# Patient Record
Sex: FEMALE | Race: WHITE
[De-identification: names, ages, dates, MRNs, and addresses within clinical notes are randomized per-mention and may not be internally consistent; named-entity substitution may affect disease eponyms.]

---

## 2021-05-25 ENCOUNTER — HOSPITAL ENCOUNTER (EMERGENCY)
Dept: HOSPITAL 100 - ED | Age: 31
Discharge: HOME | End: 2021-05-25
Payer: COMMERCIAL

## 2021-05-25 VITALS
RESPIRATION RATE: 15 BRPM | OXYGEN SATURATION: 100 % | HEART RATE: 84 BPM | DIASTOLIC BLOOD PRESSURE: 97 MMHG | SYSTOLIC BLOOD PRESSURE: 128 MMHG

## 2021-05-25 VITALS — BODY MASS INDEX: 35.4 KG/M2

## 2021-05-25 VITALS
SYSTOLIC BLOOD PRESSURE: 139 MMHG | HEART RATE: 102 BPM | OXYGEN SATURATION: 97 % | RESPIRATION RATE: 16 BRPM | TEMPERATURE: 98.3 F | DIASTOLIC BLOOD PRESSURE: 76 MMHG
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
## 2021-05-25 DIAGNOSIS — S90.31XA: Primary | ICD-10-CM
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
## 2021-05-25 DIAGNOSIS — F17.210: ICD-10-CM

## 2021-05-25 DIAGNOSIS — J45.909: ICD-10-CM

## 2021-05-25 PROCEDURE — 73630 X-RAY EXAM OF FOOT: CPT

## 2021-05-25 PROCEDURE — 99282 EMERGENCY DEPT VISIT SF MDM: CPT

## 2022-06-06 ENCOUNTER — HOSPITAL ENCOUNTER (EMERGENCY)
Age: 32
Discharge: HOME | End: 2022-06-06
Payer: COMMERCIAL

## 2022-06-06 VITALS
SYSTOLIC BLOOD PRESSURE: 125 MMHG | DIASTOLIC BLOOD PRESSURE: 78 MMHG | RESPIRATION RATE: 14 BRPM | OXYGEN SATURATION: 99 % | HEART RATE: 78 BPM

## 2022-06-06 VITALS
SYSTOLIC BLOOD PRESSURE: 127 MMHG | RESPIRATION RATE: 18 BRPM | HEART RATE: 66 BPM | OXYGEN SATURATION: 98 % | TEMPERATURE: 97.3 F | DIASTOLIC BLOOD PRESSURE: 75 MMHG

## 2022-06-06 VITALS — BODY MASS INDEX: 28.7 KG/M2

## 2022-06-06 DIAGNOSIS — F31.9: ICD-10-CM

## 2022-06-06 DIAGNOSIS — F17.210: ICD-10-CM

## 2022-06-06 DIAGNOSIS — R56.9: ICD-10-CM

## 2022-06-06 DIAGNOSIS — E11.65: Primary | ICD-10-CM

## 2022-06-06 DIAGNOSIS — G89.29: ICD-10-CM

## 2022-06-06 LAB
ANION GAP: 7 (ref 5–15)
BUN SERPL-MCNC: 12 MG/DL (ref 7–18)
BUN/CREAT RATIO: 11.1 RATIO (ref 10–20)
CALCIUM SERPL-MCNC: 9.4 MG/DL (ref 8.5–10.1)
CARBON DIOXIDE: 28 MMOL/L (ref 21–32)
CHLORIDE: 99 MMOL/L (ref 98–107)
DEPRECATED RDW RBC: 39.5 FL (ref 35.1–43.9)
ERYTHROCYTE [DISTWIDTH] IN BLOOD: 12.2 % (ref 11.6–14.6)
EST GLOM FILT RATE - AFR AMER: 75 ML/MIN (ref 60–?)
ESTIMATED CREATININE CLEARANCE: 67.29 ML/MIN
GLUCOSE, DIPSTICK: 1000 MG/DL
GLUCOSE: 506 MG/DL (ref 74–106)
HCT VFR BLD AUTO: 42.8 % (ref 37–47)
HEMOGLOBIN: 14.9 G/DL (ref 12–15)
HGB BLD-MCNC: 14.9 G/DL (ref 12–15)
IMMATURE GRANULOCYTES COUNT: 0.03 X10^3/UL (ref 0–0)
MCV RBC: 88.4 FL (ref 81–99)
MEAN CORP HGB CONC: 34.8 G/DL (ref 32–36)
MEAN PLATELET VOL.: 10.9 FL (ref 6.2–12)
MUCOUS THREADS URNS QL MICRO: (no result) /HPF
NRBC FLAGGED BY ANALYZER: 0 % (ref 0–5)
PLATELET # BLD: 184 K/MM3 (ref 150–450)
PLATELET COUNT: 184 K/MM3 (ref 150–450)
POTASSIUM: 3.9 MMOL/L (ref 3.5–5.1)
RBC # BLD AUTO: 4.84 M/MM3 (ref 4.2–5.4)
RBC DISTRIBUTION WIDTH CV: 12.2 % (ref 11.6–14.6)
RBC DISTRIBUTION WIDTH SD: 39.5 FL (ref 35.1–43.9)
RBC UR QL: (no result) /HPF (ref 0–5)
RBC UR QL: 25 /UL
SP GR UR: 1.01 (ref 1–1.03)
SQUAMOUS URNS QL MICRO: (no result) /HPF (ref 5–10)
URINE PRESERVATIVE: (no result)
WBC # BLD AUTO: 7.9 K/MM3 (ref 4.4–11)
WHITE BLOOD COUNT: 7.9 K/MM3 (ref 4.4–11)

## 2022-06-06 PROCEDURE — 81001 URINALYSIS AUTO W/SCOPE: CPT

## 2022-06-06 PROCEDURE — 96360 HYDRATION IV INFUSION INIT: CPT

## 2022-06-06 PROCEDURE — 80048 BASIC METABOLIC PNL TOTAL CA: CPT

## 2022-06-06 PROCEDURE — A4216 STERILE WATER/SALINE, 10 ML: HCPCS

## 2022-06-06 PROCEDURE — 96361 HYDRATE IV INFUSION ADD-ON: CPT

## 2022-06-06 PROCEDURE — 85025 COMPLETE CBC W/AUTO DIFF WBC: CPT

## 2022-06-06 PROCEDURE — 82962 GLUCOSE BLOOD TEST: CPT

## 2022-06-06 PROCEDURE — 99283 EMERGENCY DEPT VISIT LOW MDM: CPT

## 2022-12-24 ENCOUNTER — HOSPITAL ENCOUNTER (EMERGENCY)
Age: 32
Discharge: LEFT BEFORE DISCHARGE | End: 2022-12-24
Payer: COMMERCIAL

## 2022-12-24 VITALS — TEMPERATURE: 98.42 F | RESPIRATION RATE: 18 BRPM | HEART RATE: 78 BPM | OXYGEN SATURATION: 99 %

## 2022-12-24 VITALS — BODY MASS INDEX: 33.3 KG/M2

## 2022-12-24 DIAGNOSIS — G89.29: ICD-10-CM

## 2022-12-24 DIAGNOSIS — J45.909: ICD-10-CM

## 2022-12-24 DIAGNOSIS — E11.65: ICD-10-CM

## 2022-12-24 DIAGNOSIS — R74.8: ICD-10-CM

## 2022-12-24 DIAGNOSIS — F17.210: ICD-10-CM

## 2022-12-24 DIAGNOSIS — F41.9: ICD-10-CM

## 2022-12-24 DIAGNOSIS — R10.9: Primary | ICD-10-CM

## 2022-12-24 DIAGNOSIS — F31.9: ICD-10-CM

## 2022-12-24 LAB
ALANINE AMINOTRANSFER ALT/SGPT: 170 U/L (ref 13–56)
ALBUMIN SERPL-MCNC: 4.1 G/DL (ref 3.2–5)
ALKALINE PHOSPHATASE: 77 U/L (ref 45–117)
ANION GAP: 4 (ref 5–15)
AST(SGOT): 151 U/L (ref 15–37)
BILIRUB UR QL STRIP: 1 MG/DL
BUN SERPL-MCNC: 18 MG/DL (ref 7–18)
BUN/CREAT RATIO: 21.3 RATIO (ref 10–20)
CALCIUM SERPL-MCNC: 9.5 MG/DL (ref 8.5–10.1)
CARBON DIOXIDE: 27 MMOL/L (ref 21–32)
CHLORIDE: 106 MMOL/L (ref 98–107)
DEPRECATED RDW RBC: 38.9 FL (ref 35.1–43.9)
ERYTHROCYTE [DISTWIDTH] IN BLOOD: 12.1 % (ref 11.6–14.6)
EST GLOM FILT RATE - AFR AMER: 99 ML/MIN (ref 60–?)
ESTIMATED CREATININE CLEARANCE: 84.71 ML/MIN
GLOBULIN: 3.5 G/DL (ref 2.2–4.2)
GLUCOSE, DIPSTICK: 1000 MG/DL
GLUCOSE: 254 MG/DL (ref 74–106)
HCT VFR BLD AUTO: 45.8 % (ref 37–47)
HEMOGLOBIN: 15.4 G/DL (ref 12–15)
HGB BLD-MCNC: 15.4 G/DL (ref 12–15)
IMMATURE GRANULOCYTES COUNT: 0.03 X10^3/UL (ref 0–0)
INTERNAL QC VALIDATED?: (no result)
KETONE-DIPSTICK: 15 MG/DL
LEUKOCYTE ESTERASE UR QL STRIP: 25 /UL
MCV RBC: 87.4 FL (ref 81–99)
MEAN CORP HGB CONC: 33.6 G/DL (ref 32–36)
MEAN PLATELET VOL.: 10.2 FL (ref 6.2–12)
MUCOUS THREADS URNS QL MICRO: (no result) /HPF
NRBC FLAGGED BY ANALYZER: 0 % (ref 0–5)
PLATELET # BLD: 213 K/MM3 (ref 150–450)
PLATELET COUNT: 213 K/MM3 (ref 150–450)
POTASSIUM: 3.8 MMOL/L (ref 3.5–5.1)
PROT UR QL STRIP.AUTO: 30 MG/DL
RBC # BLD AUTO: 5.24 M/MM3 (ref 4.2–5.4)
RBC DISTRIBUTION WIDTH CV: 12.1 % (ref 11.6–14.6)
RBC DISTRIBUTION WIDTH SD: 38.9 FL (ref 35.1–43.9)
RBC UR QL: (no result) /HPF (ref 0–5)
SP GR UR: 1.02 (ref 1–1.03)
SQUAMOUS URNS QL MICRO: (no result) /HPF (ref 5–10)
URINE PRESERVATIVE: (no result)
WBC # BLD AUTO: 9.6 K/MM3 (ref 4.4–11)
WHITE BLOOD COUNT: 9.6 K/MM3 (ref 4.4–11)

## 2022-12-24 PROCEDURE — 85025 COMPLETE CBC W/AUTO DIFF WBC: CPT

## 2022-12-24 PROCEDURE — 81025 URINE PREGNANCY TEST: CPT

## 2022-12-24 PROCEDURE — A4216 STERILE WATER/SALINE, 10 ML: HCPCS

## 2022-12-24 PROCEDURE — 81001 URINALYSIS AUTO W/SCOPE: CPT

## 2022-12-24 PROCEDURE — 80053 COMPREHEN METABOLIC PANEL: CPT

## 2022-12-24 PROCEDURE — 99282 EMERGENCY DEPT VISIT SF MDM: CPT

## 2022-12-26 ENCOUNTER — HOSPITAL ENCOUNTER (EMERGENCY)
Age: 32
Discharge: HOME | End: 2022-12-26
Payer: COMMERCIAL

## 2022-12-26 VITALS
HEART RATE: 97 BPM | SYSTOLIC BLOOD PRESSURE: 122 MMHG | TEMPERATURE: 97.7 F | OXYGEN SATURATION: 100 % | RESPIRATION RATE: 14 BRPM | DIASTOLIC BLOOD PRESSURE: 97 MMHG

## 2022-12-26 VITALS — BODY MASS INDEX: 32.5 KG/M2

## 2022-12-26 DIAGNOSIS — E11.9: ICD-10-CM
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
## 2022-12-26 DIAGNOSIS — F31.9: ICD-10-CM

## 2022-12-26 DIAGNOSIS — J45.909: ICD-10-CM

## 2022-12-26 DIAGNOSIS — F17.210: ICD-10-CM

## 2022-12-26 DIAGNOSIS — S22.31XA: Primary | ICD-10-CM

## 2022-12-26 DIAGNOSIS — F41.9: ICD-10-CM

## 2022-12-26 LAB
GLUCOSE, DIPSTICK: 1000 MG/DL
MUCOUS THREADS URNS QL MICRO: (no result) /HPF
RBC UR QL: (no result) /HPF (ref 0–5)
SP GR UR: 1.01 (ref 1–1.03)
SQUAMOUS URNS QL MICRO: (no result) /HPF (ref 5–10)
URINE PRESERVATIVE: (no result)

## 2022-12-26 PROCEDURE — 99282 EMERGENCY DEPT VISIT SF MDM: CPT

## 2022-12-26 PROCEDURE — 81001 URINALYSIS AUTO W/SCOPE: CPT

## 2022-12-26 PROCEDURE — 71101 X-RAY EXAM UNILAT RIBS/CHEST: CPT

## 2023-04-07 ENCOUNTER — HOSPITAL ENCOUNTER (OUTPATIENT)
Dept: HOSPITAL 100 - BIMLAB | Age: 33
Discharge: HOME | End: 2023-04-07
Payer: COMMERCIAL

## 2023-04-07 DIAGNOSIS — E11.9: Primary | ICD-10-CM

## 2023-04-07 LAB
ALANINE AMINOTRANSFER ALT/SGPT: 19 U/L (ref 13–56)
ALBUMIN SERPL-MCNC: 3.7 G/DL (ref 3.2–5)
ALKALINE PHOSPHATASE: 91 U/L (ref 45–117)
ANION GAP: 7 (ref 5–15)
AST(SGOT): 11 U/L (ref 15–37)
BUN SERPL-MCNC: 10 MG/DL (ref 7–18)
BUN/CREAT RATIO: 11.1 RATIO (ref 10–20)
CALCIUM SERPL-MCNC: 9.3 MG/DL (ref 8.5–10.1)
CARBON DIOXIDE: 25 MMOL/L (ref 21–32)
CHLORIDE: 102 MMOL/L (ref 98–107)
DEPRECATED RDW RBC: 38.2 FL (ref 35.1–43.9)
ERYTHROCYTE [DISTWIDTH] IN BLOOD: 12.1 % (ref 11.6–14.6)
EST GLOM FILT RATE - AFR AMER: 92 ML/MIN (ref 60–?)
GLOBULIN: 3.3 G/DL (ref 2.2–4.2)
GLUCOSE: 519 MG/DL (ref 74–106)
HCT VFR BLD AUTO: 43.2 % (ref 37–47)
HEMOGLOBIN: 14.9 G/DL (ref 12–15)
HGB BLD-MCNC: 14.9 G/DL (ref 12–15)
IMMATURE GRANULOCYTES COUNT: 0.01 X10^3/UL (ref 0–0)
MCV RBC: 86.2 FL (ref 81–99)
MEAN CORP HGB CONC: 34.5 G/DL (ref 32–36)
MEAN PLATELET VOL.: 11.4 FL (ref 6.2–12)
NRBC FLAGGED BY ANALYZER: 0 % (ref 0–5)
PLATELET # BLD: 192 K/MM3 (ref 150–450)
PLATELET COUNT: 192 K/MM3 (ref 150–450)
POTASSIUM: 4 MMOL/L (ref 3.5–5.1)
RBC # BLD AUTO: 5.01 M/MM3 (ref 4.2–5.4)
RBC DISTRIBUTION WIDTH CV: 12.1 % (ref 11.6–14.6)
RBC DISTRIBUTION WIDTH SD: 38.2 FL (ref 35.1–43.9)
WBC # BLD AUTO: 6.5 K/MM3 (ref 4.4–11)
WHITE BLOOD COUNT: 6.5 K/MM3 (ref 4.4–11)

## 2023-04-07 PROCEDURE — 36415 COLL VENOUS BLD VENIPUNCTURE: CPT

## 2023-04-07 PROCEDURE — 85025 COMPLETE CBC W/AUTO DIFF WBC: CPT

## 2023-04-07 PROCEDURE — 80053 COMPREHEN METABOLIC PANEL: CPT

## 2023-04-11 ENCOUNTER — HOSPITAL ENCOUNTER (OUTPATIENT)
Dept: HOSPITAL 100 - LABSPEC | Age: 33
Discharge: HOME | End: 2023-04-11
Payer: COMMERCIAL

## 2023-04-11 DIAGNOSIS — E11.9: Primary | ICD-10-CM

## 2023-04-11 LAB
CREAT UR-MCNC: 107 MG/DL
MICROALBUMIN UR-MCNC: 10.4 MG/L

## 2023-04-11 PROCEDURE — 82570 ASSAY OF URINE CREATININE: CPT

## 2023-04-11 PROCEDURE — 82043 UR ALBUMIN QUANTITATIVE: CPT

## 2023-06-09 ENCOUNTER — HOSPITAL ENCOUNTER (OUTPATIENT)
Dept: HOSPITAL 100 - MTRAD | Age: 33
Discharge: HOME | End: 2023-06-09
Payer: COMMERCIAL

## 2023-06-09 DIAGNOSIS — M25.511: Primary | ICD-10-CM

## 2023-06-09 DIAGNOSIS — G89.29: ICD-10-CM

## 2023-06-09 DIAGNOSIS — M54.50: ICD-10-CM

## 2023-06-09 PROCEDURE — 72100 X-RAY EXAM L-S SPINE 2/3 VWS: CPT

## 2023-06-09 PROCEDURE — 73030 X-RAY EXAM OF SHOULDER: CPT

## 2023-11-17 ENCOUNTER — HOSPITAL ENCOUNTER (EMERGENCY)
Dept: HOSPITAL 100 - ED | Age: 33
Discharge: LEFT BEFORE DISCHARGE | End: 2023-11-17
Payer: COMMERCIAL

## 2023-11-17 VITALS
RESPIRATION RATE: 18 BRPM | DIASTOLIC BLOOD PRESSURE: 89 MMHG | OXYGEN SATURATION: 99 % | HEART RATE: 101 BPM | TEMPERATURE: 96.62 F | SYSTOLIC BLOOD PRESSURE: 126 MMHG

## 2023-11-17 VITALS — BODY MASS INDEX: 30.8 KG/M2

## 2023-11-17 DIAGNOSIS — R51.9: Primary | ICD-10-CM

## 2024-02-18 ENCOUNTER — HOSPITAL ENCOUNTER (EMERGENCY)
Age: 34
Discharge: HOME | End: 2024-02-18
Payer: MEDICAID

## 2024-02-18 VITALS
SYSTOLIC BLOOD PRESSURE: 137 MMHG | TEMPERATURE: 97.34 F | DIASTOLIC BLOOD PRESSURE: 93 MMHG | RESPIRATION RATE: 18 BRPM | HEART RATE: 93 BPM | OXYGEN SATURATION: 100 %

## 2024-02-18 VITALS — BODY MASS INDEX: 32.3 KG/M2

## 2024-02-18 DIAGNOSIS — E11.9: ICD-10-CM

## 2024-02-18 DIAGNOSIS — M25.531: Primary | ICD-10-CM

## 2024-02-18 DIAGNOSIS — F17.210: ICD-10-CM

## 2024-02-18 DIAGNOSIS — Z79.84: ICD-10-CM

## 2024-02-18 DIAGNOSIS — J45.909: ICD-10-CM

## 2024-02-18 PROCEDURE — 99283 EMERGENCY DEPT VISIT LOW MDM: CPT

## 2024-02-18 PROCEDURE — 73110 X-RAY EXAM OF WRIST: CPT

## 2024-02-18 NOTE — EDS_ITS
HPI    
History of Present Illness    
Chief Complaint: Upper Extremity Injury    
Detail of Chief Complaint: Right wrist pain    
Informant: patient    
Narrative    
Narrative:     
Patient presents with right wrist pain that started 4 days ago.  Patient states   
that she moved to bed 4 days ago but other now does not recall fall or injury of  
any type.  She is right-hand dominant.  Pain worse with certain movements.  At   
times she had some numbness and tingling in her fingertips of the thumb and   
index finger.    
    
PFSH    
PFSH    
Medical History (Reviewed 08/10/23 @ 15:21 by Natty Resendiz)    
    
Anxiety    
Asthma    
Bipolar disorder    
Bone fracture    
Chronic pain    
Depression    
Diabetes    
Generalized headaches    
H/O emotional problems    
History of back problems    
History of recurrent UTIs    
Seizures    
Smoker    
Substance abuse    
Tobacco abuse    
Type 2 diabetes mellitus    
Urinary incontinence, nocturnal enuresis    
Vision problems    
    
    
                                Home Medications    
    
desmopressin 0.2 mg tablet tablet PO 04/07/23 [History Last Taken Unknown]    
trospium 20 mg tablet 20 mg PO 05/18/23 [History Last Taken Unknown]    
glimepiride 2 mg tablet See Rx Instructions .Route .COMPLEX #60 tabs 12/05/23   
[Rx Last Taken Unknown]    
metformin 500 mg tablet See Rx Instructions .Route .COMPLEX #120 tabs 12/05/23   
[Rx Last Taken Unknown]    
naproxen 500 mg tablet (Naprosyn) 500 mg PO BID PRN pain #20 tabs 02/18/24 [Rx   
Last Taken Unknown]    
    
    
                                            
    
    
    
Allergy/AdvReac Type Severity Reaction Status Date / Time    
     
latex Allergy  Itching Verified 02/18/24 07:50    
     
Sulfa (Sulfonamide Allergy  Hives Verified 02/18/24 07:50    
    
Antibiotics)         
     
zolpidem [From Ambien] Allergy  Other Verified 02/18/24 07:50    
    
    
    
Family History (Reviewed 08/10/23 @ 15:21 by Natty Resendiz)    
Uncle   Alcoholism    
   Cancer    
Sister   Anxiety    
   Asthma    
   Arthritis    
   Hypertension    
Father   Arthritis    
   Cancer    
   Heart disease    
   High cholesterol    
   CVA (cerebral vascular accident)    
   Suicide    
Grandmother   Cancer    
   Seizures    
Aunt   Cancer    
   Kidney disease    
Mother   Seizures    
Other   FH: birth defects    
    
    
    
Social History (Reviewed 08/10/23 @ 15:21 by Natty Resendiz)    
Smoking Status:  Current every day smoker tobacco type: cigarettes     
alcohol intake:  never     
substance use type:  does not use     
what type of physical activity do you participate in:  none     
    
    
    
ROS    
ROS ED    
Review of Systems    
ROS Unobtainable: other    
Constitutional    
Constitutional ED: Reports lethargy; Denies chills, fever(s), sweats or weight   
loss    
Eyes    
Eyes: Denies blurry vision, change in vision or diplopia    
ENT    
ENT ED: Denies rhinorrhea or sore throat    
Cardiovascular    
Cardiovascular: Denies chest pain, orthopnea or racing heartbeat    
Respiratory/Chest    
Respiratory/Chest: Denies cough, dyspnea, dyspnea on exertion, orthopnea or   
sputum    
Gastrointestinal    
Gastrointestinal: Denies abdominal pain, diarrhea, nausea or vomiting    
Genitourinary    
Genitourinary ED: Denies dysuria, hematuria or urinary frequency    
Musculoskeletal    
Musculoskeletal: Reports other Details: Right wrist pain ;     
Denies arthralgias, back pain, myalgias or neck pain    
Integumentary    
Denies abscess, Abrasions or rash    
Neurologic    
Neurologic: Denies headache(s) or weakness    
Psychiatric    
Psychiatric: Denies anxiety, depression or suicidal thoughts    
Endocrine    
Endocrinology: Denies polydipsia, polyphagia or polyuria    
Hematologic/Lymphatic    
Hematologic/Lymphatic: Denies easy bleeding, easy bruising or lymphadenopathy    
Allergic/Immunologic    
Allergic/Immunologic ED: Denies mouth swelling, tongue swelling or urticaria    
    
EXAM    
Physical Exam    
Const    
Vital Signs:     
                                            
    
    
    
 02/18/24    
07:48    
     
Temperature 97.4 F L    
     
Temperature Source Temporal    
     
Pulse Rate 93    
     
Respiratory Rate 18    
     
Blood Pressure 137/93 H    
     
Blood Pressure Mean 107    
     
Pulse Ox 100    
     
Oxygen Delivery Method Room Air    
    
    
    
Positive well nourished and well developed    
General Appearance ED: well developed and NAD    
HEENT    
Reports TM's clear and moist mucous membranes    
normocephalic and atraumatic; Negative for trauma or tenderness    
Tympanic Membrane ED: Yes TM's clear    
Eyes    
PERRL and EOMs intact bilaterally    
General Eye ED: Negative for pale conjunctiva or scleral icterus    
Neck    
no lymphadenopathy, supple and no JVD    
General: Negative for tenderness    
Chest Wall    
inspection of chest normal and palpation of chest normal    
Chest: Negative for tenderness    
Resp    
normal respiratory effort and clear to auscultation bilaterally    
Effort and Inspection: Negative for respiratory distress or pain with movement    
Auscultation: Negative for rhonchi, wheezes or diminished lung sounds    
Cardio    
regular rate, regular rhythm, S1 normal heart sound, S2 normal heart sound and   
no murmurs    
Peripheral Pulses: pulses 2+ throughout    
GI    
normal to inspection, nondistended, normoactive bowel sounds, soft to palpation,  
non-tender, non-distended and no masses    
Back/Spine    
no CVA tenderness and no thoracic nor lumbar tenderness    
Extremity    
Extremity Narrative:     
Right wrist-no obvious edema or soft tissue swelling.  No erythema or cellulitic  
changes.  He has pain with flexion extension of the wrist.  She has tenderness   
over the distal radius and radiocarpal joint.  Some mild pain in the anatomic   
snuffbox.  Negative Finkelstein test.  Normal range of motion flexion extension   
of all digits.  Normal strength.    
General Extremety ED: Negative for edema    
General Extremity: Negative for edema    
Neuro    
oriented x3, CN's II-XII intact bilaterally, no sensory deficits noted and gait   
normal    
Sensorium / Orientation: awake, alert, oriented to person, oriented to place and  
oriented to time    
Motor Exam: strength 5/5 throughout and strength abnormal    
Psych    
mental status grossly normal    
Skin    
no rashes or lesions noted and no wounds    
    
MDM    
MDM    
MDM Narrative    
Medical decision making narrative:     
Patient will have x-rays of the left wrist to rule out carpal bone dislocation   
or other abnormality.    
Radiography    
Diagnostic Testing:     
Three-view x-rays of the right wrist obtained interpreted by myself as no   
evidence of fracture dislocation or acute process.    
    
Discharge Plan    
Triage    
Chief Complaint: Upper Extremity Injury    
    
ED Provider: Benigno Holland    
    
Dx/Rx/DC Orders    
Clinical Impression:    
 Acute pain of right wrist    
    
    
Instructions:  ED Pain, Acute, Uncertain Cause    
    
Prescriptions:    
New    
  naproxen [Naprosyn] 500 mg tablet     
   500 mg PO BID PRN (Reason: pain) Qty: 20 0RF    
    
No Action    
  desmopressin 0.2 mg tablet     
     PO      
  trospium 20 mg tablet     
   20 mg PO      
   Patient Comments:    
   TAKE 1 TABLET BY MOUTH TWICE DAILY    
  metformin 500 mg tablet     
   See Rx Instructions  .ROUTE .COMPLEX Qty: 120 0RF    
   Dose Instruction:    
   Take 2 tablets by mouth twice daily    
   Rx Instructions:    
   Take 2 tablets by mouth twice daily    
  glimepiride 2 mg tablet     
   See Rx Instructions  .ROUTE .COMPLEX Qty: 60 0RF    
   Dose Instruction:    
   Take 1 tablet by mouth twice daily    
   Rx Instructions:    
   Take 1 tablet by mouth twice daily    
    
Primary Care Provider: Care Physician,No Primary    
    
Referrals:    
Michael Estrada MD [Med Staff - Active Staff] - 3-5 Days    
Care Physician,No Primary [Primary Care Provider] -     
    
Disposition    
***Disposition***: Home, Self Care

## 2024-02-18 NOTE — RAD_ITS
REPORT-ID:[id number] 
 
EXAM:  XR RIGHT WRIST COMPLETE, 3 OR MORE VIEWS 
 
CLINICAL INDICATION:  pain, injury 
 
TECHNIQUE:  Frontal, lateral and oblique views of the right wrist. 
 
COMPARISON:  No relevant prior studies available. 
 
FINDINGS: 
 
BONES/JOINTS:  No acute abnormality. 
 
SOFT TISSUES:  Normal.  No soft tissue swelling or gas.  No radiopaque 
foreign body. 
 
ORDER #: [id number] RAD/Wrist min 3 Views  
IMPRESSION:  
Intact right wrist.  
 
  
Electronically Signed:  
Papi Dumont MD  
2024/02/18 at 8:30 EST  
Reading Location ID and State: [id number] / [place]  
Tel [phone], Service support  [phone], Fax [phone]

## 2024-02-18 NOTE — XMS RPT_ITS
Comprehensive CCD (C-CDA v2.1)  
  
                          Created on: 2024  
  
  
Ms. Oziel Frankie HERNANDEZ  
External Reference #: CDR,PersonID:[id number]  
: 1990  
Sex: Female  
  
Demographics  
  
  
                                        Address             [address] [place] [address]  [address]  
   
                                        Mobile Phone        [phone]  
   
                                        Home Phone          [phone]  
   
                                        Phone               [phone]  
   
                                        Preferred Language  en  
   
                                        Marital Status      Single  
   
                                        Orthodox Affiliation Unknown  
   
                                        Race                White  
   
                                        Ethnic Group        Not  or Lati  
no  
  
  
Author  
  
  
                                        Name                Unknown  
   
                                        Address             [address]  
[address]  
[place]  [address]  
   
                                        Phone               [phone]  
   
                                        Organization        [facility]  
  
  
Care Team Providers  
  
  
                                Care Team Member Name Role            Phone  
   
                                DRU LAM (MD) Referring       Unav  
ailable  
   
                                FABIANA MOLINA, ARISTIDES FERGUSON Attending       Unavail  
tim LAM MD., DRU Primary Care    Unavail  
Dru Manley MD Primary Care Provider   
[phone]  
   
                                NESS MOLINA, DRU Primary Care Physician ( [phone]  
   
                                SARA ASTORGA Attending       Unavailable  
   
                                DRU LAM Primary Care    Unavailab  
DRU [facility]    Unavailab  
DRU [facility]    Unavailab culp  
  
  
  
Allergies  
  
  
                                                    Allergy   
Classification                          Reported   
Allergen(s)               Allergy Type              Date of   
Onset                     Reaction(s)               Facility  
   
                                                      
(20 sources)                            Latex;   
Translations:   
[LATEX]                                 Propensity to   
adverse   
reactions to   
drug   
(disorder)                                
9                         Itching                   [facility]   
Repository  
   
                                                      
(20 sources)                            Sulfonamides   
(Antibiotic);   
Translations:   
[SULFA   
(SULFONAMIDE   
ANTIBIOTICS)]                           Propensity to   
adverse   
reactions to   
drug   
(disorder)                                
8                         Hives                     [facility]   
Repository  
   
                                                      
(20 sources)                            zolpidem;   
Translations:   
[ZOLPIDEM]                Drug Allergy                
8                                       Mental Status   
Change                                  [facility]   
Repository  
   
                                                      
(1 source)                              Sulfonamides   
(Antibiotic);   
Translations:   
[sulfa drugs]   Drug allergy                                    [facility]  
  
  
  
Medications  
Current Medications  
  
  
  
                      Medication Drug Class(es) Dates      Sig (Normalized) Sig   
(Original)  
   
                                                    cyclobenzaprine   
hydrochloride 10 mg   
oral tablet  
(1 source)                Muscle Relaxant           Start:   
2022  
End:   
2023                              take 1 tablet by   
mouth twice daily                       Flexeril *** use   
cyclobenzaprine ***   
Dose : 10 mg =,   
Oral, BID, X 10   
day(s), # 20 tab(s),   
0 Refill(s),   
23 10:38:00   
EST, Contusion of   
rib Start Date:   
22 Stop Date:   
23 Status:   
Ordered  
   
                                                    Diaper,Brief,   
Adult,Disposable   
(ADULT BRIEFS -   
MEDIUM)  
(3 sources)                                         Start:   
2021  
End:   
2022                                          Diaper,Brief,   
Adult,Disposable   
(ADULT BRIEFS -   
MEDIUM) 1 brief at   
night 90 Each 3   
2021 Active  
  
  
  
                                          
   
                                          
   
                                          
   
                                          
   
                                          
   
                                          
   
                                          
   
                                          
   
                                          
   
                                          
   
                                          
   
                                          
   
                                          
  
  
  
Completed/Discontinued Medications  
  
  
  
                      Medication Drug Class(es) Dates      Sig (Normalized) Sig   
(Original)  
   
                                                    acetaminophen 500 mg   
oral tablet  
(20 sources)                                                take 1 tablet by   
mouth every eight   
hours as needed                         acetaminophen   
(TYLENOL) 500 mg   
tablet Take 500 mg by   
mouth every 8 hours   
as needed. 0 Active  
  
  
  
                                          
   
                                          
   
                                          
   
                                          
   
                                          
   
                                          
   
                                          
   
                                          
   
                                          
   
                                          
   
                                          
   
                                          
   
                                          
   
                                          
   
                                          
   
                                          
   
                                          
   
                                          
   
                                          
   
                                          
   
                                          
   
                                          
   
                                          
   
                                          
   
                                          
   
                                          
   
                                          
   
                                          
   
                                          
   
                                          
   
                                          
   
                                          
   
                                          
   
                                          
   
                                          
   
                                          
   
                                          
   
                                          
   
                                          
   
                                          
   
                                          
   
                                          
   
                                          
   
                                          
   
                                          
   
                                          
   
                                          
   
                                          
   
                                          
   
                                          
   
                                          
   
                                          
   
                                          
   
                                          
   
                                          
   
                                          
   
                                          
   
                                          
   
                                          
   
                                          
  
  
  
Problems  
Active Problems  
  
  
                      Problem Classification Problem    Date       Documented Da  
te Episodic/Chronic  
   
                                                    Allergic reactions  
(1 source)                              Allergy to   
sulfonamide;   
Translations:   
[Allergy status to   
sulfonamides status]                                         Episodic  
   
                                                    Anxiety disorders  
(20 sources)                            Anxiety;   
Translations:   
[Anxiety disorder,   
unspecified]                            2018          Chronic  
   
                                                    Asthma  
(20 sources)                            Mild intermittent   
asthma; Translations:   
[Mild intermittent   
asthma,   
uncomplicated]                          Onset:   
2008                Chronic  
   
                                                    Coma; stupor; and   
brain damage  
(1 source)                              Somnolence;   
Translations:   
[Somnolence]                            Onset:   
2019                                          Episodic  
   
                                                    Deficiency and other   
anemia  
(20 sources)                            Anemia; Translations:   
[Anemia, unspecified]                     10-          Episodic  
   
                                                    Diabetes mellitus with   
complications  
(20 sources)                            Type II diabetes   
mellitus   
uncontrolled;   
Translations: [Type 2   
diabetes mellitus   
with hyperglycemia]                     Onset:   
2022                                          Chronic  
   
                                                    Diabetes mellitus   
without complication  
(20 sources)                            Type 2 diabetes   
mellitus;   
Translations: [Type 2   
diabetes mellitus   
without   
complications]                          2018          Chronic  
   
                                                    Disorders usually   
diagnosed in infancy,   
childhood, or   
adolescence  
(20 sources)                            Attention deficit   
hyperactivity   
disorder,   
predominantly   
inattentive type;   
Translations: [Other   
specified behavioral   
and emotional   
disorders with onset   
usually occurring in   
childhood and   
adolescence]                            Onset:   
2008                Chronic  
   
                                                    E Codes: Fall  
(1 source)                              Fall; Translations:   
[Unspecified fall,   
initial encounter]                                          Episodic  
   
                                                    Genitourinary symptoms   
and ill-defined   
conditions  
(20 sources)                            Nocturnal enuresis;   
Translations:   
[Nocturnal enuresis]                    Onset:   
2009                Chronic  
   
                                                    Genitourinary symptoms   
and ill-defined   
conditions  
(4 sources)                             Increased frequency   
of urination;   
Translations:   
[Frequency of   
micturition]                                                Episodic  
   
                                                    Headache; including   
migraine  
(20 sources)                            Migraine without   
aura, not refractory   
; Translations:   
[Migraine without   
aura, not   
intractable, without   
status migrainosus]                     Onset:   
2008                Chronic  
   
                                                    Headache; including   
migraine  
(1 source)                              Headache;   
Translations:   
[Headache,   
unspecified headache   
type]                                   2023          Episodic  
   
                                                    Inflammatory diseases   
of female pelvic   
organs  
(1 source)                              Acute vaginitis;   
Translations: [Acute   
vaginitis]                                                  Episodic  
   
                                                    Miscellaneous mental   
health disorders  
(20 sources)                            Dissociative   
convulsions;   
Translations:   
[Conversion disorder   
with seizures or   
convulsions]                            Onset:   
2019                Chronic  
   
                                                    Mood disorders  
(20 sources)                            Bipolar disorder;   
Translations:   
[Bipolar disorder,   
unspecified]                            Onset:   
2008                Chronic  
   
                                                    Mycoses  
(1 source)                              Candidiasis;   
Translations:   
[Candidiasis,   
unspecified]                                                Episodic  
   
                                                    Other congenital   
anomalies  
(20 sources)                            Fragile X chromosome;   
Translations:   
[Fragile X   
chromosome]                             2018          Chronic  
   
                                                    Other diseases of   
bladder and urethra  
(2 sources)                             Overactive bladder;   
Translations:   
[Overactive bladder]                                         Chronic  
   
                                                    Other female genital   
disorders  
(1 source)                              Pruritus of vagina;   
Translations: [Other   
specified   
noninflammatory   
disorders of vagina]                                         Episodic  
   
                                                    Other fractures  
(1 source)                              Closed fracture of   
one rib;   
Translations:   
[Fracture of one rib,   
right side,   
subsequent encounter   
for fracture with   
routine healing]                                            Episodic  
   
                                                    Other gastrointestinal   
disorders  
(1 source)                              Loose stool;   
Translations: [Other   
fecal abnormalities]                                         Episodic  
   
                                                    Other nervous system   
disorders  
(20 sources)                            H/O: migraine;   
Translations:   
[Personal history of   
other diseases of the   
nervous system and   
sense organs]                           2018          Episodic  
   
                                                    Other non-traumatic   
joint disorders  
(1 source)                              Chronic pain of right   
upper limb;   
Translations: [Pain   
in right shoulder]                                          Episodic  
   
                                                    Other nutritional;   
endocrine; and   
metabolic disorders  
(20 sources)                            Obese class I;   
Translations:   
[Obesity,   
unspecified]                            Onset:   
2018                Chronic  
   
                                                    Other screening for   
suspected conditions   
(not mental disorders   
or infectious disease)  
(1 source)                              Patient encounter   
status; Translations:   
[Encounter for   
screening for lipoid   
disorders]                                                  Episodic  
   
                                                    Superficial injury;   
contusion  
(1 source)                              Contusion of chest;   
Translations:   
[Contusion of thorax,   
unspecified, initial   
encounter]                              Onset:   
2022                                          Episodic  
   
                                                    Unclassified  
(1 source)      OPENED IN ERROR                                   
   
                                                    Urinary tract   
infections  
(1 source)                              Acute cystitis;   
Translations: [Acute   
cystitis without   
hematuria]                                                  Episodic  
   
                                                    Viral infection  
(20 sources)                            Herpes labialis;   
Translations:   
[Herpesviral   
vesicular dermatitis]                     2018          Episodic  
  
  
Past or Other Problems  
  
  
                                                    Problem   
Classification  Problem         Date            Documented Date Episodic/Chronic  
   
                                                    Other connective   
tissue disease  
(20 sources)                            Enthesopathy of   
ankle AND/OR tarsus;   
Translations: [Other   
enthesopathy of   
unspecified foot and   
ankle]                                  Onset:   
2009                Episodic  
   
                                                    Other nervous system   
disorders  
(20 sources)                            Abnormal gait;   
Translations:   
[Unspecified   
abnormalities of   
gait and mobility]                      Onset:   
2009                Episodic  
   
                                                    Residual codes;   
unclassified  
(20 sources)                            H/O: urinary   
disease;   
Translations:   
[Personal history of   
other specified   
conditions]                             Onset:   
2018                Episodic  
  
  
  
Results  
  
  
                      Test Name  Value      Interpretation Reference Range Facil  
ity  
  
  
  
                                          
   
                                          
   
                                          
   
                                          
   
                                          
   
                                          
   
                                          
   
                                          
   
                                          
   
                                          
   
                                          
   
                                          
   
                                          
   
                                          
   
                                          
   
                                          
   
                                          
   
                                          
   
                                          
   
                                          
   
                                          
   
                                          
   
                                          
   
                                          
   
                                          
   
                                          
   
                                          
   
                                          
   
                                          
   
                                          
   
                                          
   
                                          
   
                                          
   
                                          
   
                                          
   
                                          
   
                                          
   
                                          
   
                                          
   
                                          
   
                                          
   
                                          
   
                                          
   
                                          
   
                                          
   
                                          
   
                                          
   
                                          
   
                                          
   
                                          
   
                                          
   
                                          
   
                                          
   
                                          
   
                                          
   
                                          
   
                                          
   
                                          
   
                                          
   
                                          
   
                                          
   
                                          
   
                                          
   
                                          
   
                                          
   
                                          
   
                                          
   
                                          
  
  
  
Vital Signs  
  
  
                          Date Time    Vital Sign   Value        Performing   
Clinician                               Facility  
   
                                                    2023   
18:          Body temperature    98.01 [degF]        Dinesh Valles APRN.CNP  
Work Phone:   
[phone]                          [facility]  
   
                                                    2023   
18:          Body weight         83.73 kg            Dinesh Valles APRN.CNP  
Work Phone:   
[phone]                          [facility]  
   
                                                    2023   
18:                              Diastolic blood   
pressure                  104 mm[Hg]                Dinesh Valles APRN.CNP  
Work Phone:   
[phone]                          [facility]  
   
                                                    2023   
18:          Heart rate          102 /min            Dinesh Valles APRN.CNP  
Work Phone:   
[phone]                          [facility]  
   
                                                    2023   
18:          Respiratory rate    18 /min             Dinesh Valles APRN.CNP  
Work Phone:   
[phone]                          [facility]  
   
                                                    2023   
18:                              SaO2% (BldA) [Mass   
fraction]                 97 %                      Dinesh JOHNSCNP  
Work Phone:   
[phone]                          [facility]  
   
                                                    2023   
18:                              Systolic blood   
pressure                  138 mm[Hg]                Dinesh LUTZ.CNP  
Work Phone:   
[phone]                          [facility]  
   
                                                    2023   
18:          Body temperature    97.81 [degF]        Krislyn Aberegg PA  
Work Phone:   
[phone]                          [facility]  
   
                                                    2023   
18:          Body weight         80.29 kg            Krislyn Aberegg PA  
Work Phone:   
[phone]                          [facility]  
   
                                                    2023   
18:                              Diastolic blood   
pressure                  80 mm[Hg]                 Krislyn Aberegg PA  
Work Phone:   
[phone]                          [facility] [facility]  
   
                                                    2023   
18:          Heart rate          108 /min            Krislyn Aberegg PA  
Work Phone:   
[phone]                          [facility]  
   
                                                    2023   
18:          Respiratory rate    16 /min             Krislyn Aberegg PA  
Work Phone:   
[phone]                          [facility]  
   
                                                    2023   
18:                              SaO2% (BldA) [Mass   
fraction]                 97 %                      Krislyn Aberegg PA  
Work Phone:   
[phone]                          [facility]  
   
                                                    2023   
18:                              Systolic blood   
pressure                  122 mm[Hg]                Krislyn Aberegg PA  
Work Phone:   
[phone]                          [facility]  
   
                                                    2023   
16:          Body height         160 cm              Korey Saleem PA-C  
Work Phone:   
[phone]                          [facility]  
   
                                                    2023   
16:          Body temperature    97.39 [degF]        Korey Saleem PA-C  
Work Phone:   
[phone]                          [facility]  
   
                                                    2023   
16:          Body weight         81.19 kg            Korey Saleem PA-C  
Work Phone:   
[phone]                          [facility]  
   
                                                    2023   
16:                              Diastolic blood   
pressure                  96 mm[Hg]                 Korey Saleem PA-C  
Work Phone:   
[phone]                          [facility]  
   
                                                    2023   
16:          Heart rate          102 /min            Korey Njoney PA-C  
Work Phone:   
[phone]                          [facility]  
   
                                                    2023   
16:          Respiratory rate    12 /min             Korey Saleem PA-C  
Work Phone:   
[phone]                          [facility]  
   
                                                    2023   
16:                              SaO2% (BldA) [Mass   
fraction]                 96 %                      Korey Saleem PA-C  
Work Phone:   
[phone]                          [facility]  
   
                                                    2023   
16:                              Systolic blood   
pressure                  134 mm[Hg]                Korey Saleem PA-C  
Work Phone:   
[phone]                          [facility]  
   
                                                    2023   
15:          Body temperature    98.01 [degF]        Olimpia LUTZ.CNP  
Work Phone:   
[phone]                          [facility]  
   
                                                    2023   
15:          Body weight         85.28 kg            Olimpia LUTZ.CNP  
Work Phone:   
[phone]                          [facility]  
   
                                                    2023   
15:                              Diastolic blood   
pressure                  70 mm[Hg]                 Olimpia LUTZ.CNP  
Work Phone:   
[phone]                          [facility]  
   
                                                    2023   
15:          Heart rate          113 /min            Olimpia LUTZ.CNP  
Work Phone:   
[phone]                          [facility]  
   
                                                    2023   
15:          Respiratory rate    20 /min             Olimpia LUTZ.CNP  
Work Phone:   
[phone]                          [facility]  
   
                                                    2023   
15:                              SaO2% (BldA) [Mass   
fraction]                 98 %                      Olimpia LUTZ.CNP  
Work Phone:   
[phone]                          [facility]  
   
                                                    2023   
15:                              Systolic blood   
pressure                  104 mm[Hg]                Olimpia LUTZ.CNP  
Work Phone:   
[phone]                          [facility]  
   
                                                    2023   
18:          Body weight         86.18 kg            Natalia LUTZ.CNP  
Work Phone:   
[phone]                          [facility]  
   
                                                    2023   
18:                              Diastolic blood   
pressure                  88 mm[Hg]                 Natalia Podlogar   
APRN.CNP  
Work Phone:   
[phone]                          [facility]  
   
                                                    2023   
18:          Heart rate          114 /min            Natalia Podlogar   
APRN.CNP  
Work Phone:   
[phone]                          [facility]  
   
                                                    2023   
18:          Respiratory rate    18 /min             Natalia Podlogar   
APRN.CNP  
Work Phone:   
[phone]                          [facility]  
   
                                                    2023   
18:                              SaO2% (BldA) [Mass   
fraction]                 98 %                      Natalia Podlogar   
APRN.CNP  
Work Phone:   
[phone]                          [facility]  
   
                                                    2023   
18:                              Systolic blood   
pressure                  132 mm[Hg]                Natalia Podlogar   
APRN.CNP  
Work Phone:   
[phone]                          [facility]  
   
                                                    2022   
10:          Body height         165 cm              ARISTIDES JUNG MD  
Work Phone:   
[phone]                           [facility]  
   
                                                    2022   
10:          Body temperature    98.24 [degF]        ARISTIDES JUNG MD  
Work Phone:   
[phone]                           [facility]  
   
                                                    2022   
10:          Body weight         91 kg               ARISTIDES JUNG MD  
Work Phone:   
[phone]                           [facility]  
   
                                                    2022   
10:                              Diastolic Blood   
Pressure Non-Invasive     96 1                      ARISTIDES JUNG MD  
Work Phone:   
[phone]                           [facility]  
   
                                                    2022   
10:          Heart rate          100 /min            ARISTIDES JUNG MD  
Work Phone:   
[phone]                           [facility]  
   
                                                    2022   
10:          Respiratory rate    16 /min             ARISTIDES JUNG MD  
Work Phone:   
[phone]                           [facility]  
   
                                                    2022   
10:                              Systolic Blood   
Pressure Non-Invasive     152 1                     ARISTIDES JUNG MD  
Work Phone:   
[phone]                           [facility]  
   
                                                    2022   
17:          Body temperature    98.6 [degF]         Marce Chloé APRN.CNP  
Work Phone:   
[phone]                          [facility]  
   
                                                    2022   
17:          Body weight         73.12 kg            Marce Chloé APRN.CNP  
Work Phone:   
[phone]                          [facility]  
   
                                                    2022   
17:                              Diastolic blood   
pressure                  82 mm[Hg]                 Marce Chloé APRN.CNP  
Work Phone:   
[phone]                          [facility]  
   
                                                    2022   
17:          Heart rate          102 /min            Marce Chloé APRN.CNP  
Work Phone:   
[phone]                          [facility]  
   
                                                    2022   
17:          Respiratory rate    21 /min             Marce Chloé APRN.CNP  
Work Phone:   
[phone]                          [facility]  
   
                                                    2022   
17:                              SaO2% (BldA) [Mass   
fraction]                 99 %                      Marce Chloé APRN.CNP  
Work Phone:   
[phone]                          [facility]  
   
                                                    2022   
17:                              Systolic blood   
pressure                  108 mm[Hg]                Marce Chloé APRN.CNP  
Work Phone:   
[phone]                          [facility]  
   
                                                    2022   
11:          Body temperature    96.91 [degF]        Raj Stalin   
APRN.CNP  
Work Phone:   
[phone]                          [facility]  
   
                                                    2022   
11:          Body weight         72.58 kg            Raj Stalin   
APRN.CNP  
Work Phone:   
[phone]                          [facility]  
   
                                                    2022   
11:                              Diastolic blood   
pressure                  86 mm[Hg]                 Raj Stalin   
APRN.CNP  
Work Phone:   
[phone]                          [facility]  
   
                                                    2022   
11:          Heart rate          86 /min             Raj Stalin   
APRN.CNP  
Work Phone:   
[phone]                          [facility]  
   
                                                    2022   
11:          Respiratory rate    16 /min             Raj Gant   
APRN.KATHLEEN  
Work Phone:   
[phone]                          [facility]  
   
                                                    2022   
11:                              Systolic blood   
pressure                  120 mm[Hg]                Raj Gant   
APRN.KATHLEEN  
Work Phone:   
[phone]                          [facility]  
  
  
  
Encounters  
  
  
                          Encounter Date Encounter Type Care Provider Facility  
   
                                Start: 2024 Telephone encounter Sara angeles APRN.CNP  
Work Phone: [phone]              [facility]  
  
  
  
                                          
   
                                          
   
                                          
   
                                          
   
                                          
   
                                          
   
                                          
   
                                          
   
                                          
   
                                          
   
                                          
   
                                          
   
                                          
   
                                          
   
                                          
   
                                          
   
                                          
   
                                          
   
                                          
   
                                          
   
                                          
   
                                          
   
                                          
   
                                          
   
                                          
   
                                          
   
                                          
   
                                          
   
                                          
   
                                          
   
                                          
   
                                          
   
                                          
   
                                          
   
                                          
   
                                          
   
                                          
   
                                          
   
                                          
   
                                          
   
                                          
   
                                          
   
                                          
   
                                          
   
                                          
   
                                          
   
                                          
   
                                          
   
                                          
   
                                          
   
                                          
   
                                          
   
                                          
   
                                          
   
                                          
   
                                          
   
                                          
   
                                          
   
                                          
   
                                          
   
                                          
  
  
  
Procedures  
  
  
                          Date         Procedure    Procedure Detail Performing   
Clinician  
   
                                        Start: 2023   Gluc bld gluc mntr d  
ev   
cleared fda spec home use                           [facility]  
   
                                        Start: 2023   Urnls dip stick/tabl  
et   
rgnt auto w/o microscopy                            Korey Saleem PA-C  
Work Phone:   
[phone]  
   
                                        Start: 2023   Urnls dip stick/tabl  
et   
rgnt auto w/o microscopy                            Ileana Worrell APRN.CNP  
Work Phone:   
[phone]  
   
                                        Start: 2022   Urnls dip stick/tabl  
et   
rgnt auto w/o microscopy                            [facility]  
   
                                        Start: 2022   Urnls dip stick/tabl  
et   
rgnt auto w/o microscopy                            Raj Gant APRN.CNP  
Work Phone:   
[phone]  
   
                                        Start: 2022   Adult depression   
screening assessment                                Natalia Ospina APRN.CNP  
Work Phone:   
[phone]  
  
  
  
Plan of Treatment  
  
  
                          Date         Care Activity Detail       Author  
   
                                                    Start:   
2030                              Urine microalbumin   
profile                                             [facility]  
   
                                                    Start:   
2025          HPV TESTING         HPV TESTING         [facility]  
   
                                                    Start:   
2025          PAP TESTING         PAP TESTING         [facility]  
   
                                                    Start:   
2025                              Screening for malignant   
neoplasm of cervix                                  [facility]  
   
                                                    Start:   
2024                              ANNUAL PCP TEAM CHRONIC   
DISEASE VISIT                           ANNUAL PCP TEAM CHRONIC   
DISEASE VISIT                           [facility]  
   
                                                    Start:   
2024          Depression Assessment Depression Assessment [facility]  
   
                                                    Start:   
2023          Influenza vaccination Influenza Vaccine (#1) [facility]  
   
                                                    Start:   
2023                              ANNUAL PCP TEAM CHRONIC   
DISEASE VISIT                           ANNUAL PCP TEAM CHRONIC   
DISEASE VISIT                           [facility]  
   
                                                    Start:   
07-                              ANNUAL PCP TEAM CHRONIC   
DISEASE VISIT                           ANNUAL PCP TEAM CHRONIC   
DISEASE VISIT                           [facility]  
   
                                                    Start:   
2023                              ANNUAL PCP TEAM CHRONIC   
DISEASE VISIT                           ANNUAL PCP TEAM CHRONIC   
DISEASE VISIT                           [facility]  
   
                                                    Start:   
06-                Hepatitis B screening     URINE ALBUMIN:CREATININE   
RATIO                                   [facility]  
   
                                                    Start:   
06-                              Hepatitis B surface   
antibody level            LDL CHOLESTEROL           [facility]  
   
                                                    Start:   
2023                              3 comp foot exam   
completed                 DIABETIC FOOT EXAM        [facility]  
   
                                                    Start:   
2023                              ANNUAL PCP TEAM CHRONIC   
DISEASE VISIT                           ANNUAL PCP TEAM CHRONIC   
DISEASE VISIT                           [facility]  
   
                                                    Start:   
2023                              Diabetic foot   
examination               Diabetic Foot Exam        [facility]  
   
                                                    Start:   
2023                              Adult depression   
screening assessment      DEPRESSION SCREENING      [facility]  
   
                                                    Start:   
2023                              ANNUAL PCP TEAM CHRONIC   
DISEASE VISIT                           ANNUAL PCP TEAM CHRONIC   
DISEASE VISIT                           [facility]  
   
                                                    Start:   
2023  
End: 2023                         Bacteria identified in   
Urine by Culture                        URINE CULTURE   
Microbiology Routine   
Urgency of urination   
Expected: 2023,   
Expires: 2023                     [facility]  
Work Phone:   
[phone]  
  
  
  
                                          
   
                                          
   
                                          
   
                                          
   
                                          
   
                                          
   
                                          
   
                                          
   
                                          
   
                                          
   
                                          
   
                                          
   
                                          
   
                                          
   
                                          
   
                                          
   
                                          
   
                                          
   
                                          
   
                                          
   
                                          
   
                                          
   
                                          
   
                                          
   
                                          
   
                                          
   
                                          
   
                                          
   
                                          
   
                                          
   
                                          
   
                                          
   
                                          
   
                                          
   
                                          
   
                                          
   
                                          
   
                                          
   
                                          
   
                                          
   
                                          
   
                                          
   
                                          
   
                                          
   
                                          
   
                                          
   
                                          
   
                                          
   
                                          
   
                                          
   
                                          
   
                                          
  
  
  
Immunizations  
  
  
                      Immunization Date Immunization Notes      Care Provider Fa  
[facility]  
   
                                        2020          tetanus and diphther  
ia   
toxoids, adsorbed,   
preservative free, for   
adult use (5 Lf of   
tetanus toxoid and 2 Lf   
of diphtheria toxoid)                               Natalia Podlogar   
APRN.CNP  
Work Phone:   
[phone]                          [facility]  
   
                                        10-          influenza, injectabl  
e,   
quadrivalent,   
preservative free                                   Natalia Podlogar   
APRN.CNP  
Work Phone:   
[phone]                          [facility]  
   
                                        10-          influenza virus vacc  
ine,   
unspecified formulation                             Dinesh Valles   
APRN.CNP  
Work Phone:   
[phone]                          [facility]  
   
                                        10-          pneumococcal   
polysaccharide vaccine,   
23 valent                                           Natalia Podlogar   
APRN.CNP  
Work Phone:   
[phone]                          [facility]  
   
                                        2009          measles, mumps and   
rubella virus vaccine                               Natalia Podlogar   
APRN.CNP  
Work Phone:   
[phone]                          [facility]  
Work Phone:   
[phone]  
   
                                        2009          tetanus toxoid, redu  
emily   
diphtheria toxoid, and   
acellular pertussis   
vaccine, adsorbed                                   Natalia Podlogar   
APRN.CNP  
Work Phone:   
[phone]                          [facility]  
  
  
  
Payers  
  
  
                          Date         Payer Category Payer        Policy ID  
   
                          2023   Medicaid                  [id number]  
   
                                2023      Private Health Insurance [employer]   
[employer]   
[facility] [id number]   
2023-Present [address]   
[address] [place]   
[address] Medicaid                          [id number]  
[id number]  
   
                                2021      Unknown         [employer]  
[employer]   
FULLY INSURED   
[id number]   
2021-Present   
[phone] [address]   
[place] [address] PPO                [id number]   
[id number]  
[id number]  
   
                                2021      Unknown         [facility]  
[facility]   
FULLY INSURED   
[id number]   
2021-Present   
[phone] [address]   
[place] [address] PPO                [id number]  
[id number]  
   
                          2021   Unknown                   [id number]  
   
                          1990   Unknown                   [id number]   
[id number]  
[id number]  
  
  
  
Social History  
  
  
                          Date         Type         Detail       Facility  
   
                                                    Start: 2020  
End: 2022                         Tobacco smoking status   
[facility]                      Smokes tobacco daily      [facility]  
   
                                                      
End: 2009     History of tobacco use Cigarette Smoker    [facility]  
   
                                                    Start: 2020  
End: 2024                         Cigarettes smoked current   
(pack per day) - Reported 0.5                       [facility]  
   
                                                    Start: 2020  
End: 2022           Tobacco use and exposure  Smokeless tobacco   
non-user                                [facility]  
   
                                                    Start: 2021  
End: 2024           Alcohol intake            Current drinker of   
alcohol (finding)                       [facility]  
   
                                        Start: 2022   History SDOH Alcohol  
   
Frequency                 2                         [facility]  
   
                                        Start: 10-   History SDOH Alcohol  
   
Comment                   rarely                    [facility]  
   
                                        Start: 2022   History SDOH Social   
Connections Phone         5                         [facility]  
   
                                        Start: 2022   History SDOH Social   
Connections Gnosticism        1                         [facility]  
   
                                        Start: 2022   History SDOH Social   
Connections Living        7                         [facility]  
   
                                        Start: 2022   History SDOH Physica  
l   
Activity DPW              6                         [facility]  
   
                                        Start: 2022   History SDOH Physica  
l   
Activity MPS              10                        [facility]  
   
                          Start: 2022 History SDOH Financial 3              
[facility]  
   
                          Start: 2020 Education    12           [facility]  
   
                                                    Start: 2020  
End: 2022     Tobacco Comment     5-7 ciggs/day       [facility]  
   
                          Start: 1990 Sex Assigned At Birth Female       C  
[facility]  
   
                                                    Start: 2022  
End: 2022                         Exposure to SARS-CoV-2   
(event)                   Not sure                  [facility]  
   
                                                    Start: 2022  
End: 2022                         Exposure to SARS-CoV-2   
(event)                   Unable to assess          [facility]  
Work Phone:   
[phone]  
   
                                Start: 2022 Tobacco smoking status Light t  
obacco smoker   
(finding)                               [facility]  
   
                                                    Start: 2020  
End: 2024                         Social connection and   
isolation panel                                     [facility]  
   
                                                            Do you belong to any  
   
clubs or organizations   
such as Jain groups,   
unions, fraternal or   
athletic groups, or   
school groups?            No                        [facility]  
   
                                                            Are you now ,  
   
, ,   
, never    
or living with a partner? Never              [facility]  
   
                                                            How often to you hav  
e a   
drink containing alcohol? Monthly or less           [facility]  
   
                                                            How many standard dr  
inks   
containing alcohol do you   
have on a typical day?    3 or 4                    [facility]  
   
                                                            How often do you hav  
e 6   
or more drinks on 1   
occasion?                 Less than monthly         [facility]  
   
                                                            How hard is it for y  
ou to   
pay for the very basics   
like food, housing,   
medical care, and heating Somewhat hard             [facility]  
   
                                                            Adult Depression   
Screening Assessment      4                         [facility]  
   
                                                            Do you feel stress -  
   
tense, restless, nervous,   
or anxious, or unable to   
sleep at night because   
your mind is troubled all   
the time - these days   
[OSQ]                     Very much                 [facility]  
   
                                                            (I/We) worried steven  
er   
(my/our) food would run   
out before (I/we) got   
money to buy more.        Never true                [facility]  
   
                                Start: 2020 Gender identity Identifies as   
female   
gender (finding)                        [facility]  
  
  
  
Medical Equipment  
  
  
                                Procedure Code  Equipment Code  Equipment Origin  
al   
Text                      Equipment Identifier      Dates  
   
                                                                Test blood sugar  
(s)   
1-2 times daily. Dx:   
Type 2 DM -   
Controlled E11.9   
Insulin: No                                         Start:   
2020  
  
  
  
                                          
  
  
  
Functional Status  
  
  
                          Date         Assessment   Result       Facility  
   
                          2022   Functional Status Up ad miguelina    [facility]  
  
  
  
Mental Status  
  
  
                          Date         Assessment   Result       Facility  
   
                          2022   Mental Status Orientation Oriented x 4 [facility]  
  
  
  
Clinical Notes 2018 to 2024  
   Telephone Encounter - Susan Claudio RN - 2024 4:05 PM ESTTelephone   
Encounter - Susan Claudio RN - 2024 9:41 AM ESTTelephone Encounter - 
Tommie Nogueira - 2024 8:08 AM EST  
  
                                Note Date & Type Note            Facility  
   
                                                    2024 Miscellaneous   
Notes                                   Formatting of this note might be   
different from the original.  
Patient made aware of message.  
Aware of plan of care.  
No other questions.  
Encounter closed.  
  
Electronically signed by Susan Claudio RN at 2024 4:05   
PM EST  
Formatting of this note might be   
different from the original.  
Sara pended script with added   
notes.  
We do not have the Adult pull up   
in epic.  
  
Electronically signed by Susan Claudio RN at 2024 9:41   
AM EST  
Formatting of this note might be   
different from the original.  
Called this morning regarding   
the recent prescription that was   
sent in  
Needs to the prescription   
changed to  Adult Pull Up's   
Per patient   Adult diapers,   
they will continue issuing the   
ones with the tabs   
Please contact patient, Frankie   
can be reached at [phone]  
Electronically signed by Tommie Nogueira at 2024 8:11 AM   
EST  
documented in this encounter            [facility]  
   
                                        2024 Note     HNO ID: [id number]  
Author: SARA ASTORGA APRN.CNP  
Service: ?  
Author Type: Nurse Practitioner  
Type: Progress Notes  
Filed: 2024 16:52  
Note Text:  
Frankie Mckeon is a 33 year old   
female who presents today for a   
follow up for  
Patient presents with:  
Established Patient: Follow   
up/nocturia  
CHIEF COMPLAINT AND HISTORY OF   
PRESENT ILLNESS  
CC: follow up, refill  
33 year old female with DM, OAB   
and nocturnal enuresis she has   
been taking  
trospium 20 mg po bid with good   
results, no longer taking DDVAP   
and staying dry  
most night, she is still wearing   
pads at night PRN.  
Denies recent uti, gross   
hematuria  
Failed ditropan XL 10 mg  
No interim health changes  
DTF:every 2 hours  
NTF:0  
URGENCY:rare  
UUI:no  
BIJAN:at times  
STRAINING:no  
COMPLETE EMPTYING:yes  
PADS PER DAY: 5 diapers at   
night, not wet at this time  
FLUID INTAKE:  
sweet tea  
water  
pop  
VITALS: Height 166.4 cm (5'   
5.5 ), weight 84.8 kg (187 lb).  
ALLERGIES: Ambien [Zolpidem],   
Latex, and Sulfa (Sulfonamide   
Antibiotics)  
MEDICATIONS:  
Current Outpatient Medications  
Medication Sig Dispense Refill  
trospium (SANCTURA) 20 mg tablet   
Take 1 tablet by mouth twice   
daily. 60 tablet  
11  
Diaper,Brief, Adult,Disposable   
(DISPOSABLE BRIEF) 1 Each daily   
at bedtime. 90  
Each 3  
metFORMIN ER (GLUCOPHAGE XR) 500   
mg 24 hr tablet Take 2 tablets   
by mouth twice  
daily. 360 tablet 1  
glipiZIDE (GLUCOTROL XL) 10mg 24   
hr tablet Take 1 tablet by mouth   
once daily.  
90 tablet 1  
desmopressin acetate (DDAVP) 0.2   
mg tablet Take 1 tablet by mouth   
once daily.  
(Patient not taking: Reported on   
3/6/2023) 30 tablet 11  
empagliflozin (JARDIANCE) 10 mg   
tablet Take 1 tablet by mouth   
daily with  
breakfast. (Patient not taking:   
No sig reported) 30 tablet 2  
propranolol (INDERAL) 20 mg   
tablet Take 1 tablet by mouth   
twice daily. 180  
tablet 1  
folic acid 1 mg tablet Take 2   
tablets by mouth once daily.   
(Patient not taking:  
Reported on 2023) 60 tablet   
1  
SUMAtriptan (IMITREX) 100 mg   
tablet Take 1 tablet by mouth as   
needed. (Patient  
not taking: Reported on   
2023) 9 tablet 2  
sertraline (ZOLOFT) 50 mg tablet   
Take 1 tablet by mouth once   
daily. (Patient  
not taking: Reported on   
2023) 90 tablet 1  
albuterol HFA (VENTOLIN HFA) 90   
mcg/actuation inhaler Inhale 2   
Puffs as  
instructed every 4 hours as   
needed for Wheezing/Shortness of   
Breath. (Patient  
not taking: Reported on   
3/6/2023) 1 Inhaler 2  
blood sugar diagnostic (BLOOD   
GLUCOSE TEST) test strip Test   
blood sugar(s) 1-2  
times daily. Dx: Type 2 DM -   
Controlled E11.9 Insulin: No   
(Patient not taking:  
Reported on 2023) 100 Strip   
11  
L.acid/L.casei/B.bif/B.melinda/FOS   
(PROBIOTIC BLEND ORAL) Take by   
mouth. (Patient  
not taking: Reported on   
2023)  
prenatal 25/iron fum/folic/dha   
(PRENATAL-1 ORAL) Take by mouth.   
(Patient not  
taking: Reported on 2023)  
lamoTRIgine (LAMICTAL) 100 mg   
tablet Take 1 tablet by mouth   
once daily.  
(Patient not taking: Reported on   
2023) 30 tablet 2  
acetaminophen (TYLENOL) 500 mg   
tablet Take 500 mg by mouth   
every 8 hours as  
needed. (Patient not taking:   
Reported on 3/6/2023)  
No current facility-administered   
medications for this visit.  
SOCIAL HISTORY:  
Social History  
Tobacco Use  
Smoking status: Every Day  
Packs/day: 0.50  
Years: 10.00  
Additional pack years: 0.00  
Total pack years: 5.00  
Types: Cigarettes  
Last attempt to quit: 2009  
Years since quittin.5  
Smokeless tobacco: Never  
Tobacco comments:  
5-7 ciggs/day  
Vaping Use  
Vaping Use: Never used  
Substance Use Topics  
Alcohol use: Yes  
Comment: rarely  
Drug use: No  
PAST MEDICAL HISTORY:  
PAST MEDICAL HISTORY  
Diagnosis Date  
Anemia  
Anxiety  
Autism spectrum  
Bipolar I disorder, most recent   
episode (or current) manic, mild   
(HCC)  
Diabetes type 2, controlled   
(HCC)  
Fragile x chromosome  
Herpes labialis  
Oral herpes  
History of epilepsy  
History of migraine  
History of tobacco use  
Other and unspecified ovarian   
cyst  
Ovarian cyst  
PTSD (post-traumatic stress   
disorder)  
Unspecified , without   
mention of complication,   
unspecified  
6 Miscarriages  
Unspecified asthma(493.90)  
Urge incontinence  
Korey Saleem  
PAST SURGICAL HISTORY:  
PAST SURGICAL HISTORY  
Procedure Laterality Date  
NONE  
FAMILY HISTORY:  
FAMILY HISTORY  
Adopted: Yes  
Problem Relation Age of Onset  
Heart Mother  
None Father  
UNKNOWN  
Colon Cancer Other  
unsure who  
Cancer Other  
lung, liver (unsure who was who)  
All histories reviewed on this   
date 2024: Yes  
REVIEW OF SYSTEMS:  
CONSTITUTIONAL: Patient reports   
no recent fever or weight loss  
RESPIRATORY: Negative for cough,   
hemoptysis, wheezing, COPD,   
dyspnea or  
shortness of breath  
GI: No nausea, vomiting, or   
diarrhea  
MUSCULOSKELETAL: denies back   
pain or muscular weakness  
All other systems reviewed and   
are negative other than HPI.  
PHYSICAL EXAM:  
constitutional: appears healthy   
in no acute distress  
respiratory: normal respiratory   
motion  
Neuro: Gait normal. Se (more   
content not included)...                [facility]  
   
                                        2023 Note     Patient Outreach (  
[facility])  
--------------------------------  
--------------------------------  
----------------  
FRANKIE MCKEON ([id number])   
1990 F  
Date Time Provider Department  
23 ROSSI NATION [facility]  
During your visit today, we   
recorded the following   
information about you:  
Rossi Nation RPh 2023   
9:44 AM Signed  
Primary Care Pharmacy Panel   
Management  
This patient has been identified   
through panel management efforts   
by the  
primary care pharmacy team.  
Please contact patient and   
schedule a pharmacy phone or   
virtual visit for  
diabetes management. Please use   
New Pharmacy, New Pharmacy Phone   
call, or Video  
Primary New visit types.  
Rossi Nation RPh  
Allergies As of Date: 2023   
Noted Allergy Reaction  
AMBIEN (ZOLPIDEM) 2008 1 -   
Mental Status Change  
Comments: Hallucinations   
(visual)  
LATEX 2009 9 - Itching  
SULFA (SULFONAMIDE ANTIBIOTICS)   
2008 4 - Hives  
Date Reviewed: 2023  
Reviewed by: Gabrielle Ferris MA - Fully Assessed  
Primary Visit Diagnosis:Type 2   
diabetes (HCC) [E11.9]  
Order(s):CONSULT TO PHARMACY   
[[id number]] Order #: [id number]:   
1  
Prescriptions as of 2023  
- desmopressin acetate (DDAVP)   
0.2 mg tablet  
Take 1 tablet by mouth once   
daily.  
- trospium (SANCTURA) 20 mg   
tablet  
Take 1 tablet by mouth twice   
daily.  
- Diaper,Brief, Adult,Disposable   
(DISPOSABLE BRIEF)  
1 Each daily at bedtime.  
- metFORMIN ER (GLUCOPHAGE XR)   
500 mg 24 hr tablet  
Take 2 tablets by mouth twice   
daily.  
- glipiZIDE (GLUCOTROL XL) 10mg   
24 hr tablet  
Take 1 tablet by mouth once   
daily.  
- empagliflozin (JARDIANCE) 10   
mg tablet  
Take 1 tablet by mouth daily   
with breakfast.  
- propranolol (INDERAL) 20 mg   
tablet  
Take 1 tablet by mouth twice   
daily.  
- folic acid 1 mg tablet  
Take 2 tablets by mouth once   
daily.  
- SUMAtriptan (IMITREX) 100 mg   
tablet  
Take 1 tablet by mouth as   
needed.  
- sertraline (ZOLOFT) 50 mg   
tablet  
Take 1 tablet by mouth once   
daily.  
- albuterol HFA (VENTOLIN HFA)   
90 mcg/actuation inhaler  
Inhale 2 Puffs as instructed   
every 4 hours as needed for   
Wheezing/Shortness  
of Breath.  
- blood sugar diagnostic (BLOOD   
GLUCOSE TEST) test strip  
Test blood sugar(s) 1-2 times   
daily. Dx: Type 2 DM -   
Controlled E11.9  
Insulin: No  
- L.acid/L.casei/B.bif/B.melinda/FOS   
(PROBIOTIC BLEND ORAL)  
Take by mouth.  
- prenatal 25/iron fum/folic/dha   
(PRENATAL-1 ORAL)  
Take by mouth.  
- lamoTRIgine (LAMICTAL) 100 mg   
tablet  
Take 1 tablet by mouth once   
daily.  
- acetaminophen (TYLENOL) 500 mg   
tablet  
Take 500 mg by mouth every 8   
hours as needed.  
Problem List As Of Date   
2023 Noted Resolved  
Anemia, unspecified [D64.9]   
2008  
Bipolar Disorder, Unspecified   
[F31.9] 2008  
Attention Deficit Disorder   
without Mention of H*2008  
Mild intermittent asthma,   
uncomplicated [J45.20]2008  
Migraine without aura and   
without status migrai*2008  
Continuous tobacco abuse [Z72.0]   
2008  
Routine Gynecological   
Examination [Z01.419] 05/15/2009  
Class: Chronic  
Other Enthesopathy of Ankle and   
Tarsus [M77.50] 2009  
Abnormality of Gait [R26.9]   
2009  
Nocturnal Enuresis [N39.44]   
2009  
Urge Incontinence [N39.41]   
2009  
History of urinary incontinence   
[Z87.898] 2018  
Diabetes type 2, controlled   
(HCC) [E11.9]  
Bipolar I disorder, most recent   
episode (or cur*  
Herpes labialis [B00.1]  
Autism spectrum [F84.0]  
Anxiety [F41.9]  
Fragile x chromosome [Q99.2]  
History of migraine [Z86.69]  
Asthmatic bronchitis [J45.909]   
2018  
Anemia [D64.9]  
Tobacco use [Z72.0] 2020  
Obesity, Class I, BMI 30-34.9   
[E66.9] 2018  
Psychogenic nonepileptic seizure   
[F44.5] 2019  
Uncontrolled type 2 diabetes   
mellitus with hype*2022  
Encounter Number: [id number]  
Encounter Status:Closed by   
ROSSI NATION on 23                [facility]  
   
                                        2023 Note     HNO ID: [id number]  
Author: Rossi Nation RP  
Service: ?  
Author Type: Pharmacist  
Type: Progress Notes  
Filed: 2023 9:44 AM  
Note Text:  
Primary Care Pharmacy Panel   
Management  
This patient has been identified   
through panel management efforts   
by the  
primary care pharmacy team.  
Please contact patient and   
schedule a pharmacy phone or   
virtual visit for  
diabetes management. Please use   
New Pharmacy, New Pharmacy Phone   
call, or  
Video Primary New visit types.  
Rossi Nation [facility]  
   
                                        2023 Note     HNO ID: [id number]  
Author: Dinesh Valles APRN.CNP  
Service: ?  
Author Type: Nurse Practitioner  
Type: Progress Notes  
Filed: 2023 6:57 PM  
Note Text:  
Subjective  
HPI  
Nontoxic-appearing female   
presents urgent care chief   
complaint headache.  
Patient states she had a   
headache this morning and was   
unable to go to  
work. Presents today for   
evaluation. Also does want her   
blood sugar  
checked. States she has not   
taken her diabetes medications   
for the last 2  
weeks. Has not checked her sugar   
in over a month. Presents today   
for  
evaluation. States headache is   
2-3 out of 10 currently. Has not   
taken  
any medications for it. Overall   
feels well. Denies any fever   
body aches  
chills productive cough chest   
pain shortness of breath   
pleuritic pain  
hemoptysis nausea vomiting   
abdominal pain change in bowel   
or bladder  
habits. Past medical history   
prescription medication use and   
allergies  
reviewed.  
/104   Pulse 102   Temp   
36.7 ?C (98 ?F)   Resp 18   Wt   
83.7 kg  
(184 lb 9.6 oz)   LMP (LMP   
Unknown)   SpO2 97%   BMI 32.70   
kg/m?  
Hr 89  
.Patient presents with:  
Headache: Elevated glucose   
readings  
PAST MEDICAL HISTORY  
Diagnosis Date  
Anemia  
Anxiety  
Autism spectrum  
Bipolar I disorder, most recent   
episode (or current) manic, mild   
(HCC)  
Diabetes type 2, controlled   
(HCC)  
Fragile x chromosome  
Herpes labialis  
Oral herpes  
History of epilepsy  
History of migraine  
History of tobacco use  
Other and unspecified ovarian   
cyst  
Ovarian cyst  
PTSD (post-traumatic stress   
disorder)  
Unspecified , without   
mention of complication,   
unspecified  
6 Miscarriages  
Unspecified asthma(493.90)  
Urge incontinence  
Korey Saleem  
PAST SURGICAL HISTORY  
Procedure Laterality Date  
NONE  
ALLERGIES Ambien [Zolpidem],   
Latex, and Sulfa (Sulfonamide   
Antibiotics)  
MEDICATIONS  
desmopressin acetate (DDAVP) 0.2   
mg tablet Take 1 tablet by mouth   
once  
daily. (Patient not taking:   
Reported on 3/6/2023)  
trospium (SANCTURA) 20 mg tablet   
Take 1 tablet by mouth twice   
daily.  
Diaper,Brief, Adult,Disposable   
(DISPOSABLE BRIEF) 1 Each daily   
at  
bedtime.  
metFORMIN ER (GLUCOPHAGE XR) 500   
mg 24 hr tablet Take 2 tablets   
by mouth  
twice daily. (Patient not   
taking: Reported on 3/6/2023)  
glipiZIDE (GLUCOTROL XL) 10mg 24   
hr tablet Take 1 tablet by mouth   
once  
daily. (Patient not taking:   
Reported on 3/6/2023)  
empagliflozin (JARDIANCE) 10 mg   
tablet Take 1 tablet by mouth   
daily with  
breakfast. (Patient not taking:   
No sig reported)  
propranolol (INDERAL) 20 mg   
tablet Take 1 tablet by mouth   
twice daily.  
folic acid 1 mg tablet Take 2   
tablets by mouth once daily.   
(Patient not  
taking: No sig reported)  
SUMAtriptan (IMITREX) 100 mg   
tablet Take 1 tablet by mouth as   
needed.  
(Patient not taking: No sig   
reported)  
sertraline (ZOLOFT) 50 mg tablet   
Take 1 tablet by mouth once   
daily.  
(Patient not taking: No sig   
reported)  
albuterol HFA (VENTOLIN HFA) 90   
mcg/actuation inhaler Inhale 2   
Puffs as  
instructed every 4 hours as   
needed for Wheezing/Shortness of   
Breath.  
(Patient not taking: Reported on   
3/6/2023)  
blood sugar diagnostic (BLOOD   
GLUCOSE TEST) test strip Test   
blood  
sugar(s) 1-2 times daily. Dx:   
Type 2 DM - Controlled E11.9   
Insulin: No  
(Patient not taking: No sig   
reported)  
L.acid/L.casei/B.bif/B.melinda/FOS   
(PROBIOTIC BLEND ORAL) Take by   
mouth.  
(Patient not taking: No sig   
reported)  
prenatal 25/iron fum/folic/dha   
(PRENATAL-1 ORAL) Take by mouth.   
(Patient  
not taking: No sig reported)  
lamoTRIgine (LAMICTAL) 100 mg   
tablet Take 1 tablet by mouth   
once daily.  
(Patient not taking: No sig   
reported)  
acetaminophen (TYLENOL) 500 mg   
tablet Take 500 mg by mouth   
every 8 hours  
as needed. (Patient not taking:   
Reported on 3/6/2023)  
FAMILY HISTORY  
Adopted: Yes  
Problem Relation Age of Onset  
Heart Mother  
None Father  
UNKNOWN  
Colon Cancer Other  
unsure who  
Cancer Other  
lung, liver (unsure who was who)  
Social History  
Tobacco Use  
Smoking status: Every Day  
Packs/day: 0.50  
Years: 10.00  
Additional pack years: 0.00  
Total pack years: 5.00  
Types: Cigarettes  
Last attempt to quit: 2009  
Years since quittin.3  
Smokeless tobacco: Never  
Tobacco comments:  
5-7 ciggs/day  
Vaping Use  
Vaping Use: Never used  
Substance Use Topics  
Alcohol use: Yes  
Comment: rarely  
Drug use: No  
Review of Systems  
Constitutional: Negative for   
chills, fever and   
malaise/fatigue.  
HENT: Negative for congestion,   
ear discharge, ear pain, sinus   
pain and  
sore throat.  
Eyes: Negative for blurred   
vision, pain, discharge and   
redness.  
Respiratory: Negative for cough,   
hemoptysis, sputum production,   
shortness  
of breath, wheezing and stridor.  
Cardiovascular: Negative for   
chest pain.  
Gastrointestinal: Negative for   
abdominal pain, diarrhea, nausea   
and  
vomiting.  
Genitourinary: Negative.  
Musculoskeletal: Negative for   
myalgias.  
Skin: Negative for itching and   
rash.  
Neurological: Positive for   
headaches. Negative for   
dizziness.  
Objective  
Physical Exam  
Co (more content not   
included)...                            [facility]  
   
                                                    2023 History of Presen  
t   
illness Narrative                       Formatting of this note is   
different from the original.  
Subjective  
HPI  
  
Nontoxic-appearing female   
presents urgent care chief   
complaint headache. Patient   
states she had a headache this   
morning and was unable to go to   
work. Presents today for   
evaluation. Also does want her   
blood sugar checked. States she   
has not taken her diabetes   
medications for the last 2   
weeks. Has not checked her sugar   
in over a month. Presents today   
for evaluation. States headache   
is 2-3 out of 10 currently. Has   
not taken any medications for   
it. Overall feels well. Denies   
any fever body aches chills   
productive cough chest pain   
shortness of breath pleuritic   
pain hemoptysis nausea vomiting   
abdominal pain change in bowel   
or bladder habits. Past medical   
history prescription medication   
use and allergies reviewed.  
  
/104   Pulse 102   Temp   
36.7 C (98 F)   Resp 18   Wt   
83.7 kg (184 lb 9.6 oz)   LMP   
(LMP Unknown)   SpO2 97%   BMI   
32.70 kg/m  
Hr 89  
.Patient presents with:  
Headache: Elevated glucose   
readings  
  
PAST MEDICAL HISTORY  
Diagnosis Date  
Anemia  
Anxiety  
Autism spectrum  
Bipolar I disorder, most recent   
episode (or current) manic, mild   
(HCC)  
Diabetes type 2, controlled   
(HCC)  
Fragile x chromosome  
Herpes labialis  
Oral herpes  
History of epilepsy  
History of migraine  
History of tobacco use  
Other and unspecified ovarian   
cyst  
Ovarian cyst  
PTSD (post-traumatic stress   
disorder)  
Unspecified , without   
mention of complication,   
unspecified  
6 Miscarriages  
Unspecified asthma(493.90)  
Urge incontinence  
Korey Saleem  
  
  
PAST SURGICAL HISTORY  
Procedure Laterality Date  
NONE  
  
ALLERGIES Ambien [Zolpidem],   
Latex, and Sulfa (Sulfonamide   
Antibiotics)  
  
MEDICATIONS  
desmopressin acetate (DDAVP) 0.2   
mg tablet Take 1 tablet by mouth   
once daily. (Patient not taking:   
Reported on 3/6/2023)  
trospium (SANCTURA) 20 mg tablet   
Take 1 tablet by mouth twice   
daily.  
Diaper,Brief, Adult,Disposable   
(DISPOSABLE BRIEF) 1 Each daily   
at bedtime.  
metFORMIN ER (GLUCOPHAGE XR) 500   
mg 24 hr tablet Take 2 tablets   
by mouth twice daily. (Patient   
not taking: Reported on   
3/6/2023)  
glipiZIDE (GLUCOTROL XL) 10mg 24   
hr tablet Take 1 tablet by mouth   
once daily. (Patient not taking:   
Reported on 3/6/2023)  
empagliflozin (JARDIANCE) 10 mg   
tablet Take 1 tablet by mouth   
daily with breakfast. (Patient   
not taking: No sig reported)  
propranolol (INDERAL) 20 mg   
tablet Take 1 tablet by mouth   
twice daily.  
folic acid 1 mg tablet Take 2   
tablets by mouth once daily.   
(Patient not taking: No sig   
reported)  
SUMAtriptan (IMITREX) 100 mg   
tablet Take 1 tablet by mouth as   
needed. (Patient not taking: No   
sig reported)  
sertraline (ZOLOFT) 50 mg tablet   
Take 1 tablet by mouth once   
daily. (Patient not taking: No   
sig reported)  
albuterol HFA (VENTOLIN HFA) 90   
mcg/actuation inhaler Inhale 2   
Puffs as instructed every 4   
hours as needed for   
Wheezing/Shortness of Breath.   
(Patient not taking: Reported on   
3/6/2023)  
blood sugar diagnostic (BLOOD   
GLUCOSE TEST) test strip Test   
blood sugar(s) 1-2 times daily.   
Dx: Type 2 DM - Controlled E11.9   
Insulin: No (Patient not taking:   
No sig reported)  
L.acid/L.casei/B.bif/B.melinda/FOS   
(PROBIOTIC BLEND ORAL) Take by   
mouth. (Patient not taking: No   
sig reported)  
prenatal 25/iron fum/folic/dha   
(PRENATAL-1 ORAL) Take by mouth.   
(Patient not taking: No sig   
reported)  
lamoTRIgine (LAMICTAL) 100 mg   
tablet Take 1 tablet by mouth   
once daily. (Patient not taking:   
No sig reported)  
acetaminophen (TYLENOL) 500 mg   
tablet Take 500 mg by mouth   
every 8 hours as needed.   
(Patient not taking: Reported on   
3/6/2023)  
  
FAMILY HISTORY  
Adopted: Yes  
Problem Relation Age of Onset  
Heart Mother  
None Father  
UNKNOWN  
Colon Cancer Other  
unsure who  
Cancer Other  
lung, liver (unsure who was who)  
  
Social History  
  
Tobacco Use  
Smoking status: Every Day  
Packs/day: 0.50  
Years: 10.00  
Additional pack years: 0.00  
Total pack years: 5.00  
Types: Cigarettes  
Last attempt to quit: 2009  
Years since quittin.3  
Smokeless tobacco: Never  
Tobacco comments:  
5-7 ciggs/day  
Vaping Use  
Vaping Use: Never used  
Substance Use Topics  
Alcohol use: Yes  
Comment: rarely  
Drug use: No  
  
Review of Systems  
Constitutional: Negative for   
chills, fever and   
malaise/fatigue.  
HENT: Negative for congestion,   
ear discharge, ear pain, sinus   
pain and sore throat.  
Eyes: Negative for blurred   
vision, pain, discharge and   
redness.  
Respiratory: Negative for cough,   
hemoptysis, sputum production,   
shortness of breath, wheezing   
and stridor.  
Cardiovascular: Negative for   
chest pain.  
Gastrointestinal: Negative for   
abdominal pain, diarrhea, nausea   
and vomiting.  
Genitourinary: Negative.  
Musculoskeletal: Negative for   
myalgias.  
Skin: Negative for itching and   
rash.  
Neurological: Positive for   
headaches. Negative for   
dizziness.  
  
  
  
Objective  
Physical Exam  
Constitutional:  
General: She is not in acute   
distress.  
Appearance: She is not   
diaphoretic.  
HENT:  
Head: Normocephalic.  
Jaw: No trismus, tenderness,   
swelling or pain on movement.  
Nose: Nose normal.  
Mouth/Throat:  
Mouth: Mucous membranes are   
moist.  
Pharynx: Oropharynx is clear.   
Uvula midline. No pharyngeal   
swelling, oropharyngeal exudate,   
posterior oropharyngeal erythema   
or uvula swelling.  
Eyes:  
Conjunctiva/sclera: Conjunctivae   
normal.  
Pupils: Pupils are equal, round,   
and reactive to light.  
Cardiovascular:  
Rate and Rhythm: Normal rate and   
regular rhythm.  
Heart sounds: Normal heart   
sounds.  
Pulmonary:  
Effort: Pulmonary effort is   
normal. No tachypnea, accessory   
muscle usage or respiratory   
distress.  
Breath sounds: Normal breath   
sounds. No stridor. No wheezing,   
rhonchi or rales.  
Abdominal:  
General: There is no distension.  
Palpations: Abdomen is soft.  
Tenderness: There is no   
abdominal tenderness. There is   
no guarding or rebound.  
Musculoskeletal:  
Cervical back: Normal range of   
motion and neck supple. No   
edema, erythema, rigidity or   
tenderness. No pain with   
movement. Normal range of   
motion.  
Lymphadenopathy:  
Cervical: No cervical   
adenopathy.  
Skin:  
General: Skin is warm and dry.  
Neurological:  
Mental Status: She is alert and   
oriented to person, place, and   
time.  
  
ASSESSMENT/PLAN:  
1. Headache, unspecified   
headache type - ICD9: 784.0,   
ICD10: R51.9  
  
- GLUCOSE, BLOOD (POC)  
  
Patient diagnosed with headache.   
Fingerstick of 250. Patient was   
educated on hyperglycemia. Was   
instructed to follow plan   
established by PCP. Encouraged   
on medication adherence and   
routine blood glucose   
monitoring. Patient was educated   
on supportive therapies. Patient   
will follow up with primary care   
provider as needed. Patient was   
instructed to immediately   
proceed to emergency room for   
any new, worsening, or symptoms   
lasting longer than anticipated.   
The patient's clinical   
presentation is otherwise   
unremarkable at this time. Based   
on exam and clinical finding,   
the patient is stable for   
discharge. Plan of care was   
discussed with patient. Patient   
verbalizes understanding and   
agrees to plan of care. This   
note was generated using Dragon   
software. It may contain errors   
in wording, punctuation, or   
spelling.  
  
BOLIVAR Farr.CNP  
  
  
Electronically signed by   
Dinesh Valles APRN.CNP at   
2023 6:57 PM EST  
documented in this encounter            [facility]  
   
                                                    2023 Miscellaneous   
Notes                                   Formatting of this note might be   
different from the original.  
See Message.  
Electronically signed by Susan Claudio RN at 2023 8:30 AM   
EDT  
documented in this encounter            [facility]  
   
                                        2023 Note     HNO ID: [id number]  
Author: ZARIA Langston  
Service: ?  
Author Type: Physician Assistant  
Type: Progress Notes  
Filed: 3/6/2023 6:45 PM  
Note Text:  
This note was created using   
[id number].  
Subjective  
Frankie Mckeon is a 32 year old   
female.  
HPI 32-year-old female presents   
for vaginal itching, concern for   
yeast  
infection and right shoulder   
pain. Patient states that she   
started  
getting vaginal itching about 2   
days ago. She states that she   
always has  
vaginal discharge, which is   
unchanged. No abnormal vaginal   
bleeding. She  
states that she is a diabetic   
and gets yeast infections   
intermittently.  
She states that she feels like   
she has a yeast infection and   
this feels  
similar to prior ones. She   
denies any concern for STD. She   
denies any  
urine symptoms. She denies any   
concern for pregnancy. States   
that she  
recently stopped taking all of   
her diabetic medications that   
she is going  
to a new doctor soon. She has   
been checking her sugars at   
home, which  
have been doing okay per the   
patient. She denies any   
abdominal pain,  
vomiting. No hematuria or   
dysuria. She is also complaining   
today of  
right shoulder pain that she has   
had for a year. She states that   
she fell  
in August onto her shoulder. No   
x-rays completed at that time.   
States  
that she has had intermittent   
pain in the right shoulder for   
the past  
year. She denies any new fall or   
injury. She has been taking   
Motrin and  
Aleve with some improvement.  
PAST MEDICAL HISTORY  
Diagnosis Date  
Anemia  
Anxiety  
Autism spectrum  
Bipolar I disorder, most recent   
episode (or current) manic, mild   
(HCC)  
Diabetes type 2, controlled   
(HCC)  
Fragile x chromosome  
Herpes labialis  
Oral herpes  
History of epilepsy  
History of migraine  
History of tobacco use  
Other and unspecified ovarian   
cyst  
Ovarian cyst  
PTSD (post-traumatic stress   
disorder)  
Unspecified , without   
mention of complication,   
unspecified  
6 Miscarriages  
Unspecified asthma(493.90)  
Urge incontinence  
Korey Saleem  
PAST SURGICAL HISTORY  
Procedure Laterality Date  
NONE  
ALLERGIES Ambien [Zolpidem],   
Latex, and Sulfa (Sulfonamide   
Antibiotics)  
MEDICATIONS  
trospium (SANCTURA) 20 mg tablet   
Take 1 tablet by mouth twice   
daily.  
Diaper,Brief, Adult,Disposable   
(DISPOSABLE BRIEF) 1 Each daily   
at  
bedtime.  
propranolol (INDERAL) 20 mg   
tablet Take 1 tablet by mouth   
twice daily.  
fluconazole (DIFLUCAN) 150 mg   
tablet Take 1 tablet by mouth   
once daily  
for 1 day.  
desmopressin acetate (DDAVP) 0.2   
mg tablet Take 1 tablet by mouth   
once  
daily. (Patient not taking:   
Reported on 3/6/2023)  
metFORMIN ER (GLUCOPHAGE XR) 500   
mg 24 hr tablet Take 2 tablets   
by mouth  
twice daily. (Patient not   
taking: Reported on 3/6/2023)  
glipiZIDE (GLUCOTROL XL) 10mg 24   
hr tablet Take 1 tablet by mouth   
once  
daily. (Patient not taking:   
Reported on 3/6/2023)  
empagliflozin (JARDIANCE) 10 mg   
tablet Take 1 tablet by mouth   
daily with  
breakfast. (Patient not taking:   
No sig reported)  
folic acid 1 mg tablet Take 2   
tablets by mouth once daily.   
(Patient not  
taking: No sig reported)  
SUMAtriptan (IMITREX) 100 mg   
tablet Take 1 tablet by mouth as   
needed.  
(Patient not taking: No sig   
reported)  
sertraline (ZOLOFT) 50 mg tablet   
Take 1 tablet by mouth once   
daily.  
(Patient not taking: No sig   
reported)  
albuterol HFA (VENTOLIN HFA) 90   
mcg/actuation inhaler Inhale 2   
Puffs as  
instructed every 4 hours as   
needed for Wheezing/Shortness of   
Breath.  
(Patient not taking: Reported on   
3/6/2023)  
blood sugar diagnostic (BLOOD   
GLUCOSE TEST) test strip Test   
blood  
sugar(s) 1-2 times daily. Dx:   
Type 2 DM - Controlled E11.9   
Insulin: No  
(Patient not taking: No sig   
reported)  
L.acid/L.casei/B.bif/B.melinda/FOS   
(PROBIOTIC BLEND ORAL) Take by   
mouth.  
(Patient not taking: No sig   
reported)  
prenatal 25/iron fum/folic/dha   
(PRENATAL-1 ORAL) Take by mouth.   
(Patient  
not taking: No sig reported)  
lamoTRIgine (LAMICTAL) 100 mg   
tablet Take 1 tablet by mouth   
once daily.  
(Patient not taking: No sig   
reported)  
acetaminophen (TYLENOL) 500 mg   
tablet Take 500 mg by mouth   
every 8 hours  
as needed. (Patient not taking:   
Reported on 3/6/2023)  
FAMILY HISTORY  
Adopted: Yes  
Problem Relation Age of Onset  
Heart Mother  
None Father  
UNKNOWN  
Colon Cancer Other  
unsure who  
Cancer Other  
lung, liver (unsure who was who)  
Social History  
Tobacco Use  
Smoking status: Every Day  
Packs/day: 0.50  
Years: 10.00  
Pack years: 5.00  
Types: Cigarettes  
Last attempt to quit: 2009  
Years since quittin.6  
Smokeless tobacco: Never  
Tobacco comments:  
5-7 ciggs/day  
Vaping Use  
Vaping Use: Never used  
Substance Use Topics  
Alcohol use: Yes  
Comment: rarely  
Drug use: No  
Review of Systems  
Constitutional: Negative for   
chills and fever.  
HENT: Negative for congestion,   
ear pain and sore throat.  
Respiratory: Negative for cough   
and shortness of breath.  
Cardiovascular: Negative for   
chest pain.  
Gastrointestinal: Negative for   
diarrhea and vomiting.  
Genitourinary: Positive f (more   
content not included)...                [facility]  
   
                                                    2023 History of Presen  
t   
illness Narrative                       Formatting of this note is   
different from the original.  
This note was created using   
[email or web address].  
Subjective  
Frankie Mckeon is a 32 year old   
female.  
  
HPI 32-year-old female presents   
for vaginal itching, concern for   
yeast infection and right   
shoulder pain. Patient states   
that she started getting vaginal   
itching about 2 days ago. She   
states that she always has   
vaginal discharge, which is   
unchanged. No abnormal vaginal   
bleeding. She states that she is   
a diabetic and gets yeast   
infections intermittently. She   
states that she feels like she   
has a yeast infection and this   
feels similar to prior ones. She   
denies any concern for STD. She   
denies any urine symptoms. She   
denies any concern for   
pregnancy. States that she   
recently stopped taking all of   
her diabetic medications that   
she is going to a new doctor   
soon. She has been checking her   
sugars at home, which have been   
doing okay per the patient. She   
denies any abdominal pain,   
vomiting. No hematuria or   
dysuria. She is also complaining   
today of right shoulder pain   
that she has had for a year. She   
states that she fell in August   
onto her shoulder. No x-rays   
completed at that time. States   
that she has had intermittent   
pain in the right shoulder for   
the past year. She denies any   
new fall or injury. She has been   
taking Motrin and Aleve with   
some improvement.  
  
PAST MEDICAL HISTORY  
Diagnosis Date  
Anemia  
Anxiety  
Autism spectrum  
Bipolar I disorder, most recent   
episode (or current) manic, mild   
(HCC)  
Diabetes type 2, controlled   
(HCC)  
Fragile x chromosome  
Herpes labialis  
Oral herpes  
History of epilepsy  
History of migraine  
History of tobacco use  
Other and unspecified ovarian   
cyst  
Ovarian cyst  
PTSD (post-traumatic stress   
disorder)  
Unspecified , without   
mention of complication,   
unspecified  
6 Miscarriages  
Unspecified asthma(493.90)  
Urge incontinence  
Korey Saleem  
  
  
PAST SURGICAL HISTORY  
Procedure Laterality Date  
NONE  
  
ALLERGIES Ambien [Zolpidem],   
Latex, and Sulfa (Sulfonamide   
Antibiotics)  
  
MEDICATIONS  
trospium (SANCTURA) 20 mg tablet   
Take 1 tablet by mouth twice   
daily.  
Diaper,Brief, Adult,Disposable   
(DISPOSABLE BRIEF) 1 Each daily   
at bedtime.  
propranolol (INDERAL) 20 mg   
tablet Take 1 tablet by mouth   
twice daily.  
fluconazole (DIFLUCAN) 150 mg   
tablet Take 1 tablet by mouth   
once daily for 1 day.  
desmopressin acetate (DDAVP) 0.2   
mg tablet Take 1 tablet by mouth   
once daily. (Patient not taking:   
Reported on 3/6/2023)  
metFORMIN ER (GLUCOPHAGE XR) 500   
mg 24 hr tablet Take 2 tablets   
by mouth twice daily. (Patient   
not taking: Reported on   
3/6/2023)  
glipiZIDE (GLUCOTROL XL) 10mg 24   
hr tablet Take 1 tablet by mouth   
once daily. (Patient not taking:   
Reported on 3/6/2023)  
empagliflozin (JARDIANCE) 10 mg   
tablet Take 1 tablet by mouth   
daily with breakfast. (Patient   
not taking: No sig reported)  
folic acid 1 mg tablet Take 2   
tablets by mouth once daily.   
(Patient not taking: No sig   
reported)  
SUMAtriptan (IMITREX) 100 mg   
tablet Take 1 tablet by mouth as   
needed. (Patient not taking: No   
sig reported)  
sertraline (ZOLOFT) 50 mg tablet   
Take 1 tablet by mouth once   
daily. (Patient not taking: No   
sig reported)  
albuterol HFA (VENTOLIN HFA) 90   
mcg/actuation inhaler Inhale 2   
Puffs as instructed every 4   
hours as needed for   
Wheezing/Shortness of Breath.   
(Patient not taking: Reported on   
3/6/2023)  
blood sugar diagnostic (BLOOD   
GLUCOSE TEST) test strip Test   
blood sugar(s) 1-2 times daily.   
Dx: Type 2 DM - Controlled E11.9   
Insulin: No (Patient not taking:   
No sig reported)  
L.acid/L.casei/B.bif/B.melinda/FOS   
(PROBIOTIC BLEND ORAL) Take by   
mouth. (Patient not taking: No   
sig reported)  
prenatal 25/iron fum/folic/dha   
(PRENATAL-1 ORAL) Take by mouth.   
(Patient not taking: No sig   
reported)  
lamoTRIgine (LAMICTAL) 100 mg   
tablet Take 1 tablet by mouth   
once daily. (Patient not taking:   
No sig reported)  
acetaminophen (TYLENOL) 500 mg   
tablet Take 500 mg by mouth   
every 8 hours as needed.   
(Patient not taking: Reported on   
3/6/2023)  
  
FAMILY HISTORY  
Adopted: Yes  
Problem Relation Age of Onset  
Heart Mother  
None Father  
UNKNOWN  
Colon Cancer Other  
unsure who  
Cancer Other  
lung, liver (unsure who was who)  
  
Social History  
  
Tobacco Use  
Smoking status: Every Day  
Packs/day: 0.50  
Years: 10.00  
Pack years: 5.00  
Types: Cigarettes  
Last attempt to quit: 2009  
Years since quittin.6  
Smokeless tobacco: Never  
Tobacco comments:  
5-7 ciggs/day  
Vaping Use  
Vaping Use: Never used  
Substance Use Topics  
Alcohol use: Yes  
Comment: rarely  
Drug use: No  
  
Review of Systems  
Constitutional: Negative for   
chills and fever.  
HENT: Negative for congestion,   
ear pain and sore throat.  
Respiratory: Negative for cough   
and shortness of breath.  
Cardiovascular: Negative for   
chest pain.  
Gastrointestinal: Negative for   
diarrhea and vomiting.  
Genitourinary: Positive for   
vaginal discharge. Negative for   
pelvic pain and vaginal pain (+   
itching).  
Musculoskeletal: Positive for   
arthralgias (R shoulder).  
  
Objective  
/80   Pulse 108   Temp   
36.6 C (97.8 F)   Resp 16   Wt   
80.3 kg (177 lb)   LMP (LMP   
Unknown)   SpO2 97%   BMI 31.35   
kg/m  
Physical Exam  
Vitals and nursing note   
reviewed.  
Constitutional:  
General: She is not in acute   
distress.  
Appearance: Normal appearance.   
She is not toxic-appearing.  
HENT:  
Nose: Nose normal.  
Mouth/Throat:  
Mouth: Mucous membranes are   
moist.  
Eyes:  
Conjunctiva/sclera: Conjunctivae   
normal.  
Cardiovascular:  
Rate and Rhythm: Normal rate and   
regular rhythm.  
Pulmonary:  
Effort: Pulmonary effort is   
normal.  
Breath sounds: Normal breath   
sounds.  
Abdominal:  
General: Abdomen is flat.  
Palpations: Abdomen is soft.  
Tenderness: There is no   
abdominal tenderness. There is   
no right CVA tenderness or left   
CVA tenderness.  
Genitourinary:  
Comments: Deferred exam  
Musculoskeletal:  
Right shoulder: Tenderness   
present. No swelling, deformity   
or bony tenderness. Normal range   
of motion. Normal strength.   
Normal pulse.  
Comments: Mild tenderness over   
right shoulder. Normal ROM right   
shoulder. Normal sensation and   
strength right upper extremity.  
Skin:  
General: Skin is warm and dry.  
Neurological:  
Mental Status: She is alert.  
  
Assessment and Plan  
  
ASSESSMENT/PLAN:  
1. Vaginal itching - ICD9:   
698.1, ICD10: N89.8 (primary   
diagnosis)  
-Patient deferred vaginal exam.   
She states she has history of   
yeast infections and this feels   
similar.  
-Rx for Diflucan.  
-No concern for STD or   
pregnancy.  
  
2. Chronic right shoulder pain -   
ICD9: 719.41, 338.29, ICD10:   
M25.511, G89.29  
-Discussed getting XR although I   
do not believe it would show us   
much at this point as patient   
had an injury over a year ago.   
She has chronic right shoulder   
pain. No acute injury. Low   
suspicion for fracture.  
-Possible arthritis or   
ligamentous. Recommend follow-up   
with PCP to discuss possible   
physical therapy.  
-In the meantime, continue   
NSAIDs, ice.  
  
-I did  the patient on   
taking her diabetic medications   
as prescribed. She states she   
has an appointment with PCP   
coming up. Recommend she discuss   
all of her medications with   
them.  
  
Diagnosis and treatment plan   
were discussed and questions   
were answered to the patient's   
satisfaction. Pt acknowledged   
understanding of concepts and   
follow up plan. Specific signs   
and symptoms that would indicate   
the need for higher level of   
care were discussed in detail   
warranting prompt ER evaluation.  
  
ZARIA Langston  
  
  
Electronically signed by ZARIA Langston at 2023 6:45   
PM EST  
documented in this encounter            [facility]  
   
                                                    2023 Miscellaneous   
Notes                                   Formatting of this note might be   
different from the original.  
Patient called. Verified name   
and date of birth. Informed of   
message sent in [employer] and   
proved information for [employer]. Patient is searching   
for affordable pull-ups at this   
point since insurance has denied   
coverage. Martine Turcios LPN  
  
Electronically signed by   
Martine Turcios LPN at 2023   
10:43 AM EST  
Formatting of this note might be   
different from the original.  
Called patient. No answer- left   
message and informed sending   
detailed message to [employer].   
Martine Turcios LPN  
  
Electronically signed by   
Martine Turcios LPN at 2023   
10:32 AM EST  
Formatting of this note might be   
different from the original.  
Pt called in states the   
disposable diapers have been   
denied by insurance. Pt would   
like to know what your   
recommendation is. Has not   
scheduled with female urology.   
Given number to Dr. Bustamante's   
office to schedule. Please   
advise.  
  
Melinda Paniagua RN  
  
Electronically signed by Melinda Paniagua RN at 2023 8:32   
AM EST  
documented in this encounter            [facility]  
   
                                        2023 Instructions   
  
  
Korey Saleem PA-C -   
2023 5:30 PM ESTFormatting   
of this note might be different   
from the original.  
Refer to [facility] for Female   
Urology  
Electronically signed by Korey Saleem PA-C at 2023 5:30   
PM EST  
  
documented in this encounter            [facility]  
   
                                                    2023 History of Presen  
t   
illness Narrative                       Formatting of this note might be   
different from the original.  
Images from the original note   
were not included.  
  
[facility]   
[place]  
  
Some elements copied from his   
previous note, which have been   
updated where appropriate, and   
all reflect current medical   
decision making from date of   
this visit.  
  
PATIENT INFO: Frankie Mckeon 32   
year old  
  
PCP: Dru Lam MD  
  
CHIEF COMPLAINT:  
Urinary Frequency, Urgency and   
Incontinence  
  
HPI:  
This is a 32 year old Diabetic   
female, who has had Urinary   
Frequency, Urgency and   
Incontinence following up now   
due to Dr. Rodriguez no longer at   
[facility] and needs a Female Urology   
specialist, and has   
transportation complications   
getting to [place] she   
continues to have stress   
incontinence and enuresis, she   
continues to be distressed   
because of having to replace   
several mattresses in 1 year due   
to incontinence , medications   
are only mildly helpful. She is   
also taking Jardiance for DM-2   
which she get glycosuria  
And this is a big contributor to   
the urgency, and have aske her   
to discuss this with PCP.  
I will try to get her to see Dr. Bustamante [facility]  
  
VOIDING SYMPTOMS:  
Incontinence: Yes At night   
without any warning or waking   
her up  
  
REVIEW OF SYSTEMS:  
General: General: Well   
developed, well nourished. No   
acute distress  
Endocrine: DM II - on Glucophage  
  
PHYSICAL EXAMINATION:  
General Appearance/   
Constitutional: Well developed,   
well nourished, and in no   
apparent distress  
  
IMPRESSION / PLAN:  
> History of OAB with   
Incontinence  
> [facility] refilled  
> Glycosuria d/t Jardiance  
> Consult with Dr Kieran Bustamante for   
recommendations on her   
situations  
  
DIEGO Aguilar MT, PA-C  
Electronically signed by Korey Saleem PA-C at 2023 1:02   
PM EST  
Formatting of this note might be   
different from the original.  
Verified name and date of birth.  
  
CC Post Void Residual  
  
HPI:  
Frankie Mckeon is a 32 year old   
female.  
The patient is here now for an   
appointment with DIEGO Aguilar MT, PA-COV.  
  
Procedure:  
Explained procedure to patient   
and verbalizes understanding.   
Performed a PVR.  
Patient urinated and instructed   
to empty bladder as much as   
possible just prior to having   
PVR done using bladder   
ultrasound scanner.  
Results of scan: 0 mL  
  
The patient tolerated the   
procedure well.  
  
Plan:  
Appointment with Korey.  
  
  
Electronically signed by   
Martine Turcios LPN at 2023   
1:02 PM EST  
documented in this encounter            [facility]  
   
                                                    2023 History of Presen  
t   
illness Narrative                       Formatting of this note might be   
different from the original.  
Infectious Disease E-Consult   
Response  
  
In response to your eConsult   
Infectious Disease request for   
Frankie Mckeon regard[age] year old female patient,   
Frankie Mckeon who is being   
treated for urine culture   
results .Mixed microbiota,   
including predominantly:  
  
10,000 -<50,000 CFU/ml Candida   
glabrata (Nakaseomyces glabrata)   
Abnormal  
  
  
My clinical question: was   
treated with 2 diflucan 150mg if   
this effective?  
  
Please assess and respond with   
your recommendations regarding:   
treatment of urine culture.  
  
Provided by: BOLIVAR Durand.[facility]  
Location: [address] [address]  
Dept: [phone]  
  
History of present illness   
provided through requesting   
provider documentation and   
current treatment plan was   
reviewed.  
UTI  
Poor control DM  
NO WBC on UA dip, but mixed Cx   
including C. glabrat  
Hx allergy to Sulfa  
  
Based on the patient history   
provided, my impression is as   
follows:  
First of all always good to send   
urinalysis when sending urine   
culture. If no white cells in   
the urinalysis then positive   
culture indicates either   
bacteriuria or candiduria alone   
and does not need to be treated.   
I noticed her UA dip is   
leukocyte negative  
  
If the patient has symptoms then   
treatment of a positive urine   
culture is reasonable  
If the symptoms have any concern   
for upper tract disease (fever   
or flank pain) then getting an   
ultrasound of the kidneys and   
bladder is very reasonable to   
make sure that stones were   
anatomic issues are not involved   
in the process  
  
Candida glabrata is more   
relatively resistant to   
fluconazole than Candida   
albicans. If this patient's   
symptoms have completely   
resolved then I would recommend   
no more fluconazole for   
treatment. If the patient still   
has symptoms I would recommend   
higher dose of fluconazole. It   
appears the patient has poorly   
controlled diabetes and last CMP   
was 2022, hence make sure   
she follows through with the   
complete metabolic profile that   
was ordered the other day since   
fluconazole is dosed based on   
renal function.  
If the renal function is normal   
a treatment plan for Candida   
glabrata that is likely to be   
more successful when it is noted   
to be   
 susceptible-dose-dependent    
would be to give fluconazole 800   
mg on day 1 followed by 400 mg   
on days 2 through 7. If her   
creatinine was 2.0 or above then   
I would cut the recommended   
doses in half  
  
In light of previous high   
hemoglobin A1c/poorly controlled   
diabetes I think a low threshold   
for kidney bladder ultrasound is   
also reasonable  
  
Specialist appointment needs: No   
appointment necessary in ID at   
this time  
  
Michael Holley MD  
2023  
  
  
Electronically signed by Michael Holley MD at 2023   
3:48 PM EST  
documented in this encounter            [facility]  
   
                                                    2023 Miscellaneous   
Notes                                   Formatting of this note might be   
different from the original.  
Pt notified of results &   
verbalized understanding. Chandni Paez LPN  
  
Electronically signed by Chandni Paez LPN at 2023 12:05   
PM EST  
Formatting of this note might be   
different from the original.  
Please call patient and let her   
know her urine culture did not   
show significant bacterial   
infection in the urine, she did   
have yeast secondarily present.   
Juanlucwendy was called in.  
Electronically signed by Elina Ulrich PA-C at 2023 10:08   
AM EST  
documented in this encounter            [facility]  
   
                                                    01- Miscellaneous   
Notes                                   Formatting of this note might be   
different from the original.  
Phone call placed spoke to Ray   
at [facility] [employer]   
Services [phone] option 1,   
added request sensitivity to   
urine 01/10/2023.  
  
Marisa Oliveira LPN  
  
Electronically signed by Marisa Oliveira LPN at 01/10/2023 3:41 PM   
EST  
Formatting of this note might be   
different from the original.  
Hi nursing,  
Can we please reach out and ask   
the lab to perform sensitivity.  
Thanks  
Electronically signed by BOLIVAR Lay.CNP at 01/10/2023   
3:03 PM EST  
documented in this encounter            [facility]  
   
                                                    2023 History of Presen  
t   
illness Narrative                       Formatting of this note is   
different from the original.  
CC: Patient presents with:  
UTI: Urgency, frequncy  
  
Patient was instructed that we   
should check her blood sugar due   
to the amount of glucose that   
was in her urine. Patient says   
she knows she has uncontrolled   
diabetes and never checks her   
sugar. Patient does not want her   
blood sugar checked at all at   
this time because she knows it   
will be high. Patient states she   
is aware of the risk factors and   
she is okay with that. Patient   
does not want to go to the ER.   
Patient does not want to know if   
her blood sugar is high. Patient   
just wants to know if she has a   
UTI. Patient's only symptom is   
urinary frequency. Denies all   
other symptoms.  
  
HPI  
Frankie Mckeon is a 32 year old   
female who presents with   
complaint of possible UTI. These   
symptoms have been present for 7   
days.  
Associated symptoms: frequency  
Denies: burning, urgency,   
backpain, hematuria, pressure,   
fever, chills, sweats, abdominal   
pain, and flank pain  
Treatments: nothing  
  
The ROS was otherwise negative.  
  
PMH, Medications, labs,   
allergies, and recent past   
visits with PCP were reviewed   
and updated as able.  
  
PHYSICAL EXAM:  
/70   Pulse 113   Temp   
36.7 C (98 F)   Resp 20   Wt   
85.3 kg (188 lb)   LMP   
05/15/2022 (Approximate)   SpO2   
98%   BMI 32.78 kg/m  
General: Well appearing and   
alert  
  
PAST MEDICAL HISTORY  
Diagnosis Date  
Anemia  
Anxiety  
Autism spectrum  
Bipolar I disorder, most recent   
episode (or current) manic, mild   
(HCC)  
Diabetes type 2, controlled   
(HCC)  
Fragile x chromosome  
Herpes labialis  
Oral herpes  
History of epilepsy  
History of migraine  
History of tobacco use  
Other and unspecified ovarian   
cyst  
Ovarian cyst  
PTSD (post-traumatic stress   
disorder)  
Unspecified , without   
mention of complication,   
unspecified  
6 Miscarriages  
Unspecified asthma(493.90)  
Urge incontinence  
Korey Saleem  
  
  
PAST SURGICAL HISTORY  
Procedure Laterality Date  
NONE  
  
ALLERGIES Ambien [Zolpidem],   
Latex, and Sulfa (Sulfonamide   
Antibiotics)  
  
MEDICATIONS  
desmopressin acetate (DDAVP) 0.2   
mg tablet Take 1 tablet by mouth   
once daily.  
oxybutynin ER (DITROPAN XL) 10   
mg 24 hr tablet Take 1 tablet by   
mouth once daily.  
empagliflozin (JARDIANCE) 10 mg   
tablet Take 1 tablet by mouth   
daily with breakfast.  
folic acid 1 mg tablet Take 2   
tablets by mouth once daily.  
SUMAtriptan (IMITREX) 100 mg   
tablet Take 1 tablet by mouth as   
needed.  
sertraline (ZOLOFT) 50 mg tablet   
Take 1 tablet by mouth once   
daily.  
albuterol HFA (VENTOLIN HFA) 90   
mcg/actuation inhaler Inhale 2   
Puffs as instructed every 4   
hours as needed for   
Wheezing/Shortness of Breath.  
blood sugar diagnostic (BLOOD   
GLUCOSE TEST) test strip Test   
blood sugar(s) 1-2 times daily.   
Dx: Type 2 DM - Controlled E11.9   
Insulin: No  
L.acid/L.casei/B.bif/B.melinda/FOS   
(PROBIOTIC BLEND ORAL) Take by   
mouth.  
prenatal 25/iron fum/folic/dha   
(PRENATAL-1 ORAL) Take by mouth.  
lamoTRIgine (LAMICTAL) 100 mg   
tablet Take 1 tablet by mouth   
once daily.  
Diaper,Brief, Adult,Disposable   
(ADULT BRIEFS - MEDIUM) misc 1   
application once daily. Patient   
needs pull-up type, no tabs  
1 Application once daily  
acetaminophen (TYLENOL) 500 mg   
tablet Take 500 mg by mouth   
every 8 hours as needed.  
metFORMIN ER (GLUCOPHAGE XR) 500   
mg 24 hr tablet Take 2 tablets   
by mouth twice daily.  
glipiZIDE (GLUCOTROL XL) 10mg 24   
hr tablet Take 1 tablet by mouth   
once daily.  
propranolol (INDERAL) 20 mg   
tablet Take 1 tablet by mouth   
twice daily.  
  
FAMILY HISTORY  
Adopted: Yes  
Problem Relation Age of Onset  
Heart Mother  
None Father  
UNKNOWN  
Colon Cancer Other  
unsure who  
Cancer Other  
lung, liver (unsure who was who)  
  
Social History  
  
Tobacco Use  
Smoking status: Every Day  
Packs/day: 0.50  
Years: 10.00  
Pack years: 5.00  
Types: Cigarettes  
Last attempt to quit: 2009  
Years since quittin.5  
Smokeless tobacco: Never  
Tobacco comments:  
5-7 ciggs/day  
Vaping Use  
Vaping Use: Never used  
Substance Use Topics  
Alcohol use: Yes  
Comment: rarely  
Drug use: No  
  
ASSESSMENT/PLAN:  
1. Urgency of urination - ICD9:   
788.63, ICD10: R39.15  
  
- UA DIP, URINE (POC)  
- URINE CULTURE  
- GLUCOSE, BLOOD (POC)patient   
refused  
  
Patient was not treated at this   
time due to lack of evidence for   
urinary tract infection. Culture   
was sent so please treat if that   
is positive.  
  
Potential red flag symptoms   
discussed with the patient.   
Reviewed appropriate action plan   
to take if red flag symptoms   
occur. Patient agreeable to   
treatment plan.  
  
BOLIVAR Durand.CNP  
Electronically signed by   
Olimpia Awad APRN.CNP at   
2023 3:32 PM EST  
documented in this encounter            [facility]  
   
                                        2023 Instructions   
  
  
Natalia Ospina APRN.CNP -   
2023 6:27 PM ESTFormatting   
of this note might be different   
from the original.  
Obtain blood work and make   
follow-up appointment  
  
Have eye doctor send recent   
office visit note  
Electronically signed by Natalia Ospina APRN.CNP at 2023   
6:28 PM EST  
  
documented in this encounter            [facility]  
   
                                                    2023 History of Presen  
t   
illness Narrative                       Formatting of this note is   
different from the original.  
2023  
Patient presents with:  
ED Follow-up: [facility] 22 from   
a fall  
  
SUBJECTIVE: This is a 32 year   
old that is here today for Above   
Complaints.  
  
HOSPITAL/ER FOLLOW UP:  
Reason for visit: fall  
Which facility: [facility]  
Date of visit: 2022  
Diagnosis: non displaced rib   
fracture right rib #10, fall  
Testing done: xray  
Treatment given: pain medication  
  
Fell after a strong milo of wind   
and struck the right side of her   
back against some concrete   
stairs. Was taking flexeril and   
Vicodin but stopped a few days   
ago. Overall feeling improved.   
Using some naproxen for pain   
with some relief. Able to take   
deep breaths. Denies SOB,   
dyspnea, or coughing  
  
Reports to me she has stopped   
taking all her medications.   
Patient is unsure when she   
stopped her medications. Reports   
her blood sugar has been good.   
Last night it was 180 which she   
reports  goggle  said was fine.  
  
Follows with psychiatry with   
next appointment upcoming on   
. Denies depressive   
symptoms, SI, HI or insomnia  
  
Was taking Lamictal for hx of   
seizures but stopped that as   
well. Denies seizure activit and   
has not seen her neurologist in   
years  
  
ER visit reviewed  
  
PAST MEDICAL HISTORY  
Diagnosis Date  
Anemia  
Anxiety  
Autism spectrum  
Bipolar I disorder, most recent   
episode (or current) manic, mild   
(HCC)  
Diabetes type 2, controlled   
(HCC)  
Fragile x chromosome  
Herpes labialis  
Oral herpes  
History of epilepsy  
History of migraine  
History of tobacco use  
Other and unspecified ovarian   
cyst  
Ovarian cyst  
PTSD (post-traumatic stress   
disorder)  
Unspecified , without   
mention of complication,   
unspecified  
6 Miscarriages  
Unspecified asthma(493.90)  
Urge incontinence  
Korey Harper  
  
ALLERGIES Ambien [Zolpidem],   
Latex, and Sulfa (Sulfonamide   
Antibiotics)  
MEDICATIONS  
Current Outpatient Medications  
Medication Sig  
desmopressin acetate (DDAVP) 0.2   
mg tablet Take 1 tablet by mouth   
once daily.  
oxybutynin ER (DITROPAN XL) 10   
mg 24 hr tablet Take 1 tablet by   
mouth once daily.  
metFORMIN ER (GLUCOPHAGE XR) 500   
mg 24 hr tablet Take 2 tablets   
by mouth twice daily.  
glipiZIDE (GLUCOTROL XL) 10mg 24   
hr tablet Take 1 tablet by mouth   
once daily.  
empagliflozin (JARDIANCE) 10 mg   
tablet Take 1 tablet by mouth   
daily with breakfast.  
propranolol (INDERAL) 20 mg   
tablet Take 1 tablet by mouth   
twice daily.  
folic acid 1 mg tablet Take 2   
tablets by mouth once daily.  
SUMAtriptan (IMITREX) 100 mg   
tablet Take 1 tablet by mouth as   
needed.  
sertraline (ZOLOFT) 50 mg tablet   
Take 1 tablet by mouth once   
daily.  
albuterol HFA (VENTOLIN HFA) 90   
mcg/actuation inhaler Inhale 2   
Puffs as instructed every 4   
hours as needed for   
Wheezing/Shortness of Breath.  
blood sugar diagnostic (BLOOD   
GLUCOSE TEST) test strip Test   
blood sugar(s) 1-2 times daily.   
Dx: Type 2 DM - Controlled E11.9   
Insulin: No  
L.acid/L.casei/B.bif/B.melinda/FOS   
(PROBIOTIC BLEND ORAL) Take by   
mouth.  
prenatal 25/iron fum/folic/dha   
(PRENATAL-1 ORAL) Take by mouth.  
lamoTRIgine (LAMICTAL) 100 mg   
tablet Take 1 tablet by mouth   
once daily.  
Diaper,Brief, Adult,Disposable   
(ADULT BRIEFS - MEDIUM) misc 1   
application once daily. Patient   
needs pull-up type, no tabs  
1 Application once daily  
acetaminophen (TYLENOL) 500 mg   
tablet Take 500 mg by mouth   
every 8 hours as needed.  
  
No current facility-administered   
medications for this visit.  
  
Medications and allergies   
reviewed by this provider.  
SOCIAL HISTORY  
Social History  
  
Tobacco Use  
Smoking status: Every Day  
Packs/day: 0.50  
Years: 10.00  
Pack years: 5.00  
Types: Cigarettes  
Last attempt to quit: 2009  
Years since quittin.5  
Smokeless tobacco: Never  
Tobacco comments:  
5-7 ciggs/day  
Vaping Use  
Vaping Use: Never used  
Substance Use Topics  
Alcohol use: Yes  
Comment: rarely  
Drug use: No  
  
REVIEW OF SYSTEMS  
All other reviewed and negative   
other than HPI.  
  
OBJECTIVE:  
/88   Pulse 114   Resp 18   
  Wt 86.2 kg (190 lb)   LMP   
05/15/2022 (Approximate)   SpO2   
98%   BMI 33.13 kg/m . Vital   
signs reviewed by this provider.  
APPEARANCE Well appearing,   
alert, in no acute distress,   
well-hydrated, well nourished.  
EYES conjunctiva and sclera   
normal.  
HEART RRR with normal S1 and S2,   
no murmurs, no gallops, no JVD   
appreciated  
LUNG clear to auscultation  
BACK: Mild TTP to right side at   
the level of her bra strap. No   
ecchymosis, swelling or   
deformity observed  
SKIN Skin color, texture, turgor   
normal, no suspicious rashes or   
lesions to exposed skin  
PSYCH:  
Posture and motor behavior:   
normal posture and motor   
behavior  
Dress, grooming, personal   
hygiene: normal dress and   
grooming  
Facial expression: good eye   
contact  
Speech: talkative  
Mood: cheerful  
Coherency and relevance of   
thought: normal thought   
processes  
Memory: normal memory  
  
Component Latest Ref Rng & Units   
6/15/2022  
Protein, Total 6.3 - 8.0 g/dL   
6.4  
Albumin 3.9 - 4.9 g/dL 4.2  
Calcium 8.5 - 10.2 mg/dL 9.9  
Bilirubin, Total 0.2 - 1.3 mg/dL   
0.3  
Alkaline Phosphatase 34 - 123   
U/L 63  
AST 13 - 35 U/L 14  
ALT 7 - 38 U/L 17  
Glucose 74 - 99 mg/dL 325 (H)  
BUN 7 - 21 mg/dL 9  
Creatinine 0.58 - 0.96 mg/dL   
0.73  
Sodium 136 - 144 mmol/L 138  
Potassium 3.7 - 5.1 mmol/L 4.8  
Chloride 97 - 105 mmol/L 101  
CO2 22 - 30 mmol/L 26  
Anion Gap 9 - 18 mmol/L 11  
eGFR >=60 mL/min/1.73m 112  
WBC 3.70 - 11.00 k/uL 7.22  
RBC 3.90 - 5.20 m/uL 4.59  
Hemoglobin 11.5 - 15.5 g/dL 13.6  
Hematocrit 36.0 - 46.0 % 42.0  
MCV 80.0 - 100.0 fL 91.5  
MCH 26.0 - 34.0 pg 29.6  
MCHC 30.5 - 36.0 g/dL 32.4  
RDW-CV 11.5 - 15.0 % 12.5  
Platelet Count 150 - 400 k/uL   
196  
MPV 9.0 - 12.7 fL 11.6  
Absolute nRBC <0.01 k/uL <0.01  
Total Cholesterol, Nonfasting   
<200 mg/dL 158  
Triglycerides, Nonfasting <150   
mg/dL 242 (H)  
HDL Cholesterol, Nonfasting >39   
mg/dL 34 (L)  
LDL Cholesterol, Nonfasting <100   
mg/dL 76  
Non HDL Cholesterol, Nonfasting   
<130 mg/dL 124  
VLDL Cholesterol, Nonfasting <30   
mg/dL 48 (H)  
Total Chol/HDL Ratio, Nonfasting   
<5.10 mg/dL 4.65  
LDL/HDL Ratio, Nonfasting <2.54   
mg/dL 2.24  
Creatinine, Ur Random (UCRR)   
20.0 - 300.0 mg/dL 71.8  
Albumin, Urine Random mg/L <12.0  
Albumin/Creat Ratio <30 mg/g <17  
Hemoglobin A1C 4.3 - 5.6 % 12.2   
(H)  
Estimated Average Glucose mg/dL   
303  
  
COVID-19 VACCINE(1) Never done  
PNEUMOCOCCAL(2 - PCV) due on   
10/15/2019  
DILATED RETINAL EXAM due on   
2021  
INFLUENZA(1) due on 2022  
HBA1C due on 09/15/2022  
DEPRESSION ASSESSMENT Never done  
DIABETIC FOOT EXAM due on   
2023  
URINE ALBUMIN:CREATININE RATIO   
due on 06/15/2023  
LDL CHOLESTEROL due on   
06/15/2023  
ANNUAL PCP TEAM CHRONIC DISEASE   
VISIT due on 2024  
PAP TESTING due on 2025  
HPV TESTING due on 2025  
DTAP,TDAP,TD(3 - Td or Tdap) due   
on 2030  
HEPATITIS C SCREENING Completed  
HIV SCREENING Completed  
SPIROMETRY Discontinued  
  
ASSESSMENT/PLAN:  
1. Closed fracture of one rib of   
right side with routine healing,   
subsequent encounter - ICD9:   
V54.19, ICD10: S22.31XD (primary   
diagnosis)  
- no red flag symptoms or exam   
findings  
- red flag symptoms discussed,   
verbalizes understanding  
- continue to cough and deep   
breath  
- may use small pillow to brace   
for discomfort with coughing,   
sneezing or deep breathing  
- may use OTC pain reliever as   
directed on packaging  
- follow-up if symptoms fail to   
resolve  
  
2. Uncontrolled type 2 diabetes   
mellitus with hyperglycemia   
(HCC) - ICD9: 250.02, ICD10:   
E11.65  
uncontrolled  
- Blood glucose monitoring on a   
twice a day schedule  
- Ophthalmology referral for   
eval/management of diabetic eye   
changes  
- Encouraged regular aerobic   
exercise and weight loss  
- BP goal of <130/80  
- LDL goal of <100  
- recommend she complete labs,   
restart her medications, and   
make follow-up appointment  
- HGB A1C  
- COMP METABOLIC PANEL  
  
3. Psychogenic nonepileptic   
seizure - ICD9: 300.11, ICD10:   
F44.5  
- recommend she follow-up with   
neurology to discuss  
  
4. Bipolar affective disorder,   
remission status unspecified   
(HCC) - ICD9: 296.80, ICD10:   
F31.9  
- continue to follow with   
psychiatry  
  
BOLIVAR Jarrell.CNP  
  
Prescription instructions   
reviewed with patient as   
applicable. Patient advised if   
symptoms do not improve or if   
symptoms worsen sooner, to   
contact their primary care   
physician. Potential red flag   
symptoms discussed with the   
patient. Reviewed appropriate   
action plan to take if red flag   
symptoms occur. Patient   
agreeable to treatment plan.  
  
I spent a total of 35 minutes on   
the date of the service which   
included preparing to see the   
patient, face-to-face patient   
care, completing clinical   
documentation, obtaining and/or   
reviewing separately obtained   
history, performing a medically   
appropriate examination,   
counseling and educating the   
patient/family/caregiver, and   
ordering medications, tests, or   
procedures.  
  
  
  
  
Electronically signed by BOLIVAR Jarrell.CNP at 2023   
6:48 PM EST  
documented in this encounter            [facility]  
   
                                                    2022 Miscellaneous   
Notes                                   Formatting of this note might be   
different from the original.  
Patient seen in ER 22.   
Reached out to patient to advise   
PCP recommends follow up if not   
feeling better by 23.   
Patient voiced understanding but   
stated  It takes like 3 months   
to heal.   
Electronically signed by Chandni Dailey LPN at 2022 4:49   
PM EST  
documented in this encounter            [facility]  
   
                                                    2022 Hospital Discharg  
e   
instructions                              
  
  
Patient Education  
  
  
2022 10:38:57  
  
  
Rib Contusion  
  
  
Rib Contusion  
  
A rib contusion is a bruise to   
one or more rib bones. It may   
cause pain, tenderness, swelling   
and a purplish discoloration.   
There may be a sharp pain while   
breathing.  
You will be assessed for other   
injuries. You will likely be   
given pain medicine. Rib   
contusions heal on their own,   
without further treatment.   
However, pain may take weeks to   
months to go away.  
Note that a small crack   
(fracture) in the rib may cause   
the same symptoms as a rib   
contusion. The small crack may   
not be seen on a chest X-ray.   
However, the conditions are   
managed in the same way.  
Home care  
Rest. Avoid heavy lifting,   
strenuous exertion, or any   
activity that causes pain.  
Ice the area to reduce pain and   
swelling. Put ice cubes in a   
plastic bag or use a cold pack.   
(Wrap the cold source in a thin   
towel. Do not place it directly   
on your skin.) Ice the injured   
area for 20 minutes every 1 to 2   
hours the first day. Continue   
with ice packs 3 to 4 times a   
day for the next 2 days, then as   
needed for the relief of pain   
and swelling.  
Take any prescribed pain   
medicine as directed by your   
healthcare provider. If none was   
prescribed, take acetaminophen,   
ibuprofen, or naproxen to   
control pain.  
If you have a significant   
injury, you may be given a   
device called an incentive   
spirometer to keep your lungs   
healthy. Use as directed.  
Follow-up care  
Follow up with your healthcare   
provider during the next week or   
as directed.  
When to seek medical advice  
Call your healthcare provider   
for any of the following:  
Shortness of breath or trouble   
breathing  
Increasing chest pain with   
breathing  
Coughing  
Dizziness, weakness, or fainting  
New or worsening pain  
Fever of 100.4 F (38 C) or   
higher, or as directed by your   
healthcare provider  
  
[id number] [employer]. [address] [place] [id number]. All rights   
reserved. This information is   
not intended as a substitute for   
professional medical care.   
Always follow your healthcare   
professional's instructions.  
  
  
  
Follow Up Care  
  
  
2022 10:25:58  
  
  
With:DRU LAM MD  
Address:  
[address] [place] [address] [id number]- [phone]  
  
When:2-4 days                           [facility]  
Work Phone:   
[phone]  
   
                                        2022 Note       
  
  
  
  
Discharge Instructions  
Thank you for allowing [facility]   
to assist you with your   
healthcare needs. The following   
is important discharge   
information regarding your   
hospital visit. Diagnosis from   
Today's Visit  
Contusion of rib  
Rib/trunk pain-swelling  
  
  
What to Do Next  
  
Instructions from Your Care Team  
No qualifying data available.  
Post Acute Orders  
  
No qualifying data available.  
  
  
You Need to Schedule the   
Following Appointments  
  
Follow Up with DRU LAM MD  
When Within 2-4 days  
  
Where:  
[address] [place] [address] [phone]- [phone]  
  
  
  
  
Allergies  
  
Ambien  
sulfa drug  
  
  
  
Medications  
Please ask your primary doctor   
or pharmacist before taking any   
other medication not listed,   
including over the counter   
drugs, herbal medications,   
vitamins and or supplements as   
they may interact with your home   
medications.  
  
What How Much When Why   
Instructions Last Dose New   
cyclobenzaprine (Flexeril ***   
use cyclobenzaprine ***)  
10 Milligram  
by mouth  
Two (2) times a day  
Contusion of rib  
Duration: 10 Days  
Printed Prescription  
  
  
  
Please take this list to your   
next doctor s visit. Bring all   
medications you take, including   
over the counter medications,   
herbals and other supplements   
with you to your doctor s visit.   
Patients and families are   
reminded to discard old lists   
and to update any records with   
all medication providers or   
retail pharmacies.  
  
  
  
Medication Leaflets  
cyclobenzaprine  
  
(sye kloe NAT za preen)  
  
  
  
Dora, Comfort Pac with   
Cyclobenzaprine, Fexmid  
  
  
  
What is the most important   
information I should know about   
cyclobenzaprine?  
  
You should not use   
cyclobenzaprine if you have a   
thyroid disorder, heart block,   
congestive heart failure, a   
heart rhythm disorder, or you   
have recently had a heart   
attack.  
  
  
  
Do not use cyclobenzaprine if   
you have taken an MAO inhibitor   
in the past 14 days, such as   
isocarboxazid, linezolid,   
phenelzine, rasagiline,   
selegiline, or tranylcypromine.  
  
  
  
What is cyclobenzaprine?  
  
Cyclobenzaprine is a muscle   
relaxant. It works by blocking   
nerve impulses (or pain   
sensations) that are sent to   
your brain.  
  
  
  
Cyclobenzaprine is used together   
with rest and physical therapy   
to relieve muscle spasms caused   
by painful conditions such as an   
injury.  
  
  
  
Cyclobenzaprine may also be used   
for purposes not listed in this   
medication guide.  
  
  
  
What should I discuss with my   
healthcare provider before   
taking cyclobenzaprine?  
  
You should not use   
cyclobenzaprine if you are   
allergic to it, or if you have:  
  
a thyroid disorder;  
  
heart block, heart rhythm   
disorder, congestive heart   
failure; or  
  
if you have recently had a heart   
attack.  
  
  
  
Cyclobenzaprine is not approved   
for use by anyone younger than   
15 years old.  
  
  
  
Do not use cyclobenzaprine if   
you have taken an MAO inhibitor   
in the past 14 days. A dangerous   
drug interaction could occur.   
MAO inhibitors include   
isocarboxazid, linezolid,   
phenelzine, rasagiline,   
selegiline, and tranylcypromine.  
  
  
  
Some medicines can interact with   
cyclobenzaprine and cause a   
serious condition called   
serotonin syndrome. Be sure your   
doctor knows if you also take   
stimulant medicine, opioid   
medicine, herbal products, or   
medicine for depression, mental   
illness, Parkinson's disease,   
migraine headaches, serious   
infections, or prevention of   
nausea and vomiting. Ask your   
doctor before making any changes   
in how or when you take your   
medications.  
  
  
  
Tell your doctor if you have   
ever had:  
  
liver disease;  
  
glaucoma;  
  
enlarged prostate; or  
  
problems with urination.  
  
  
  
It is not known whether this   
medicine will harm an unborn   
baby. Tell your doctor if you   
are pregnant or plan to become   
pregnant.  
  
  
  
It may not be safe to   
breast-feed while using this   
medicine. Ask your doctor about   
any risk.  
  
  
  
Older adults may be more   
sensitive to the effects of this   
medicine.  
  
  
  
How should I take   
cyclobenzaprine?  
  
Follow all directions on your   
prescription label and read all   
medication guides or instruction   
sheets. Your doctor may   
occasionally change your dose.   
Use the medicine exactly as   
directed.  
  
  
  
Cyclobenzaprine is usually taken   
once daily for only 2 or 3   
weeks. Follow your doctor's   
dosing instructions very   
carefully.  
  
  
  
Swallow the capsule whole and do   
not crush, chew, break, or open   
it.  
  
  
  
Take the medicine at the same   
time each day.  
  
  
  
Call your doctor if your   
symptoms do not improve after 3   
weeks, or if they get worse.  
  
  
  
Store at room temperature away   
from moisture, heat, and light.  
  
  
  
What happens if I miss a dose?  
  
Take the medicine as soon as you   
can, but skip the missed dose if   
it is almost time for your next   
dose. Do not take two doses at   
one time.  
  
  
  
What happens if I overdose?  
  
Seek emergency medical attention   
or call the Poison Help line at   
1-774.121.1386. An overdose of   
cyclobenzaprine can be fatal.  
  
  
  
Overdose symptoms may include   
severe drowsiness, vomiting,   
fast heartbeats, tremors,   
agitation, or hallucinations.  
  
  
  
What should I avoid while taking   
cyclobenzaprine?  
  
Avoid driving or hazardous   
activity until you know how this   
medicine will affect you. Your   
reactions could be impaired.  
  
  
  
Avoid drinking alcohol.   
Dangerous side effects could   
occur.  
  
  
  
What are the possible side   
effects of cyclobenzaprine?  
  
Get emergency medical help if   
you have signs of an allergic   
reaction: hives; difficult   
breathing; swelling of your   
face, lips, tongue, or throat.  
  
  
  
Stop using cyclobenzaprine and   
call your doctor at once if you   
have:  
  
fast or irregular heartbeats;  
  
chest pain or pressure, pain   
spreading to your jaw or   
shoulder; or  
  
sudden numbness or weakness   
(especially on one side of the   
body), slurred speech, balance   
problems.  
  
  
  
Seek medical attention right   
away if you have symptoms of   
serotonin syndrome, such as:   
agitation, hallucinations,   
fever, sweating, shivering, fast   
heart rate, muscle stiffness,   
twitching, loss of coordination,   
nausea, vomiting, or diarrhea.  
  
  
  
Serious side effects may be more   
likely in older adults.  
  
  
  
Common side effects may include:  
  
drowsiness, tiredness;  
  
headache, dizziness;  
  
dry mouth; or  
  
upset stomach, nausea,   
constipation.  
  
  
  
This is not a complete list of   
side effects and others may   
occur. Call your doctor for   
medical advice about side   
effects. You may report side   
effects to FDA at   
2-166-NIE-2384.  
  
  
  
What other drugs will affect   
cyclobenzaprine?  
  
Using cyclobenzaprine with other   
drugs that make you drowsy can   
worsen this effect. Ask your   
doctor before using opioid   
medication, a sleeping pill, a   
muscle relaxer, or medicine for   
anxiety or seizures.  
  
  
  
Tell your doctor about all your   
other medicines, especially:  
  
bupropion (Zyban, for smoking   
cessation);  
  
meperidine;  
  
tramadol;  
  
verapamil;  
  
cold or allergy medicine that   
contains an antihistamine   
(Benadryl and others);  
  
medicine to treat Parkinson's   
disease;  
  
medicine to treat excess stomach   
acid, stomach ulcer, motion   
sickness, or irritable bowel   
syndrome;  
  
medicine to treat overactive   
bladder; or  
  
bronchodilator asthma   
medication.  
  
  
  
This list is not complete. Other   
drugs may affect   
cyclobenzaprine, including   
prescription and   
over-the-counter medicines,   
vitamins, and herbal products.   
Not all possible drug   
interactions are listed here.  
  
  
  
Where can I get more   
information?  
  
Your pharmacist can provide more   
information about   
cyclobenzaprine.  
  
  
  
Remember, keep this and all   
other medicines out of the reach   
of children, never share your   
medicines with others, and use   
this medication only for the   
indication prescribed.  
  
  
  
Every effort has been made to   
ensure that the information   
provided by [employer].   
('[employer]') is accurate,   
up-to-date, and complete, but no   
guarantee is made to that   
effect. Drug information   
contained herein may be time   
sensitive. [employer] information   
has been compiled for use by   
healthcare practitioners and   
consumers in the [place]   
and therefore [employer] does not   
warrant that uses outside of the   
[place] are appropriate,   
unless specifically indicated   
otherwise. [employer] drug   
information does not endorse   
drugs, diagnose patients or   
recommend therapy. [employer] drug   
information is an informational   
resource designed to assist   
licensed healthcare   
practitioners in caring for   
their patients and/or to serve   
consumers viewing this service   
as a supplement to, and not a   
substitute for, the expertise,   
skill, knowledge and judgment of   
healthcare practitioners. The   
absence of a warning for a given   
drug or drug combination in no   
way should be construed to   
indicate that the drug or drug   
combination is safe, effective   
or appropriate for any given   
patient. [employer] does not assume   
any responsibility for any   
aspect of healthcare   
administered with the aid of   
information [employer] provides. The   
information contained herein is   
not intended to cover all   
possible uses, directions,   
precautions, warnings, drug   
interactions, allergic   
reactions, or adverse effects.   
If you have questions about the   
drugs you are taking, check with   
your doctor, nurse or   
pharmacist.  
  
  
  
Copyright 3381-2229 [employer]. Version: 5.01.   
Revision Date: 2018.  
  
  
  
  
  
  
Education Materials  
  
  
  
Rib Contusion  
  
A rib contusion is a bruise to   
one or more rib bones. It may   
cause pain, tenderness, swelling   
and a purplish discoloration.   
There may be a sharp pain while   
breathing.  
You will be assessed for other   
injuries. You will likely be   
given pain medicine. Rib   
contusions heal on their own,   
without further treatment.   
However, pain may take weeks to   
months to go away.  
Note that a small crack   
(fracture) in the rib may cause   
the same symptoms as a rib   
contusion. The small crack may   
not be seen on a chest X-ray.   
However, the conditions are   
managed in the same way.  
Home care  
Rest. Avoid heavy lifting,   
strenuous exertion, or any   
activity that causes pain.  
Ice the area to reduce pain and   
swelling. Put ice cubes in a   
plastic bag or use a cold pack.   
(Wrap the cold source in a thin   
towel. Do not place it directly   
on your skin.) Ice the injured   
area for 20 minutes every 1 to 2   
hours the first day. Continue   
with ice packs 3 to 4 times a   
day for the next 2 days, then as   
needed for the relief of pain   
and swelling.  
Take any prescribed pain   
medicine as directed by your   
healthcare provider. If none was   
prescribed, take acetaminophen,   
ibuprofen, or naproxen to   
control pain.  
If you have a significant   
injury, you may be given a   
device called an incentive   
spirometer to keep your lungs   
healthy. Use as directed.  
Follow-up care  
Follow up with your healthcare   
provider during the next week or   
as directed.  
When to seek medical advice  
Call your healthcare provider   
for any of the following:  
Shortness of breath or trouble   
breathing  
Increasing chest pain with   
breathing  
Coughing  
Dizziness, weakness, or fainting  
New or worsening pain  
Fever of 100.4 F (38 C) or   
higher, or as directed by your   
healthcare provider  
[id number] [employer]. [address] [place] [address],   
[place] [id number]. All rights   
reserved. This information is   
not intended as a substitute for   
professional medical care.   
Always follow your healthcare   
professional's instructions.  
  
  
  
  
  
Additional Information  
VACCINATE! IT SAVES LIVES!  
Members of the community who   
have not yet received the   
COVID-19 vaccine and would like   
to receive it can visit one of   
[facility] vaccine clinics. There   
are many vaccine clinic   
locations within the [facility]. For   
locations and available times,   
please visit   
[email or web address]  
[email or web address]. It is important to note   
that some COVID mobile vaccine   
clinics are held outdoors and   
may be canceled in rainy or   
stormy conditions.  
To learn more about pediatric   
vaccinations (ages 5-11), we   
invite you to visit the [employer]   
Childrens webpage.   
[email or web address]  
[email or web address]-Novel-[email or web address]-Freq  
[email or web address]-[email or web address]-[email or web address].html To   
learn more about the COVID-19   
vaccine, we invite you to visit   
the [facility] website for a list   
of frequently asked questions.   
[email or web address]  
[email or web address]  
requently_[email or web address]  
[facility] Patient Portal   
Access Instructions:  
Stay connected with your   
healthcare team and access your   
personal medical information   
anytime with the [facility] Patient Portal. If you   
would like a full copy of your   
medical records please contact   
the [facility] Medical   
Records Department Monday   
through Friday between 8a.m. and   
4:30p.m. Please follow the   
directions below to access the   
portal:  
1.Access the email account you   
provided upon registration to   
the hospital.2.Look for an   
invitation email from [facility].3.Open the email and   
access the invitation link:   
Accept Invitation to [facility]4.Fill in the required   
fields to create your account.  
Sign into [email or web address] with   
your username and password that   
you created in the above steps   
to stay up to date. You can then   
view a summary of results, a   
summary of your visits, and the   
ability to download your   
summaries to your computer or   
send the information securely to   
a physician. Remember that your   
healthcare information is   
confidential, so carefully   
consider who you will allow to   
register on the [facility]   
Patient Portal for access to   
your information. You can also   
access the [facility]   
Patient Portal on the [employer]   
[facility] chiqui. Simply click on   
 Health Records  under  Health   
Data  and then click on the   
[employer] logo.  
  
  
HOW TO SAFELY DISPOSE OF   
PRESCRIPTION MEDICATIONS  
Please use one of the following   
methods to safely dispose of   
your unused medications.  
1.Use a drug disposal kit: the   
drug disposal pouch allows you   
to safely discard your old and   
unused drugs. Ask your nurse to   
give you one when you are   
discharged.2.Visit a local   
take-back location: Many local   
pharmacies and police   
departments have programs that   
collect old and unwanted   
prescription drugs. Call your   
local pharmacy or go to   
http://[employer]/3B2Im9h to find   
one close to you.3.Make use of   
household items: Use cat litter   
or old coffee grounds to dispose   
medications if other options are   
not available. Mix your drugs   
with these household products,   
seal them in an airtight   
container and throw it into the   
garbage. Call [facility]:   
[phone] to be sure your   
drugs can be disposed of in this   
way. Some medicines may require   
a different approach.4.Never   
flush your medications down the   
toilet.  
IF YOU HAVE BEEN PRESCRIBED AN   
OPIOIDS FOR PAIN  
If you have been prescribed an   
opioid (such as hydrocodone,   
oxycodone or morphine), it is   
critical to understand the   
possible side effects and risks   
of opioid pain medications. Even   
when taken as directed, opioids   
can have several side effects   
including:  
Tolerance, meaning you might   
need to take more of a   
medication for the same pain   
relief. Nausea, vomiting and/or   
constipation. Sleepiness,   
dizziness, dry mouth, confusion,   
depression or itching. Physical   
dependence, meaning you have   
withdrawal symptoms when a   
medication is stopped ? this can   
develop within a few days.  
KNOW YOUR RESPONSIBILITIES  
It is important to know exactly   
how much and how often to take   
the opioid pain medications you   
are prescribed. Never take   
opioids in higher amounts or   
more often than prescribed. Do   
not combine opioids with alcohol   
or other drugs that cause   
drowsiness, such as   
benzodiazepines, also known as   
benzos, including diazepam and   
alprazolam, muscle relaxants or   
sleep aids. Never sell or share   
prescription opioids. This is   
illegal. Store opioids in a   
secure place and out of reach of   
others (including children,   
family, friends and visitors).  
  
The last page(s) of this   
document has been signed and   
retained as a CHART COPY  
  
  
Signatures  
  
Patient Education Materials  
  
Rib Contusion  
  
  
Medication Leaflets  
cyclobenzaprine  
My discharge plan and   
instructions have been reviewed   
and explained to me and I,FRANKIE MCKEON understand my current   
condition and have read and   
understand these discharge   
instructions. I have received a   
written copy of the   
plan/instructions. If I have   
questions, I am aware that I   
should contact my doctor.  
Patient/Representative   
Signature:______________________  
_______ Date/Time: _____________  
Relationship to   
Patient:________________________  
_____  
Witness   
Name/Signature:_________________  
____________ Date/Time:   
_____________  
  
                                        [facility]  
   
                                                    2022 History of Presen  
t   
illness Narrative                       Formatting of this note might be   
different from the original.  
Patient fell last night on ice,   
landing hard on back, having rib   
pain and shortness of breath.  
  
Due to lack of imaging today and   
level of pain will need to go to   
ED for further evaluation and   
treatment.  
  
BOLIVAR Yun.KATHLEEN  
  
Electronically signed by Marce Luevano APRN.CNP at 2022   
12:43 PM EST  
documented in this encounter            [facility]  
   
                                                    10- History of Presen  
t   
illness Narrative                       Formatting of this note might be   
different from the original.  
Unable to connect to virtual   
with camera tried calling pt but   
no answer x 2 but left a   
detailed VM  
And refilled Ditropan and gave   
new providers in place of Dr. Rodriguez.  
  
DIEGO Aguilar, RORY MCMILLAN  
  
Electronically signed by Korey Saleem PA-C at 10/17/2022 5:03   
PM EDT  
documented in this encounter            [facility]  
   
                                                    10- Miscellaneous   
Notes                                   Formatting of this note might be   
different from the original.  
Called patient due to request   
for refill with last visit with   
Korey Saleem PA-C 2021.   
I did advise patient of need to   
schedule appointment with   
Korey Saleem PA-C and   
transferred her to scheduling.   
Patient did verbalize   
frustration of needing an   
appointment when medication does   
help and have had no changes.   
Martine Turcios LPN  
  
Electronically signed by   
Martine Turcios LPN at 10/17/2022   
11:47 AM EDT  
documented in this encounter            [facility]  
   
                                                    10- Miscellaneous   
Notes                                   Formatting of this note is   
different from the original.  
Pharmacy faxed requesting the   
following refill.  
  
Requested Prescriptions  
  
Pending Prescriptions Disp   
Refills  
desmopressin acetate (DDAVP) 0.2   
mg tablet 30 tablet 11  
Sig: Take 1 tablet by mouth once   
daily.  
  
Patient last appointment:   
2021  
  
Patient Phone numbers:   
[phone] (home)  
  
Request is for script(s) to be   
escript to pharmacy.  
  
Anne Jha MA  
  
  
Electronically signed by   
Anne Jha MA at 10/17/2022   
9:39 AM EDT  
documented in this encounter            [facility]  
   
                                                    2022 Miscellaneous   
Notes                                     
  
  
Formatting of this note might be   
different from the original.  
Patient given results and   
verbalized understanding of   
instructions given.  
Antoinette Woodward  
  
  
  
Electronically signed by Antoinette Woodward at 2022 4:48 PM EDT  
  
  
Formatting of this note might be   
different from the original.  
Urine culture grew bacteria   
indicating a bacterial urinary   
tract infection in addition to   
the yeast infection. Rx for   
nitrofurantoin sent to the   
pharmacy.  
  
  
Electronically signed by Zane Oconnor MD at 2022 1:44   
PM EDTdocumented in this   
encounter                               [facility]  
   
                                                    2022 Miscellaneous   
Notes                                     
  
  
Formatting of this note might be   
different from the original.  
Pt called and is notified of   
providers results and   
instructions. Pt voices   
understanding.  
  
Kathie Marks RN  
  
  
  
Electronically signed by Kathie Marks RN at 2022 11:08   
AM EDT  
  
  
Formatting of this note might be   
different from the original.  
This NP called patient at   
[phone] and identified with   
name and .  
Patient notified of positive   
yeast, prescription sent to   
fluconazole  
Will call with urine culture   
back.  
  
Marce Luevano CNP  
  
  
  
  
Electronically signed by Marce Luevano APRN.CNP at 2022   
10:54 AM EDTdocumented in this   
encounter                               [facility]  
   
                                                    2022 History of Presen  
t   
illness Narrative                         
  
  
Formatting of this note is   
different from the original.  
Subjective  
The history is provided by the   
patient. No    
was used.  
  
HPI Frankie Mckeon is a 31 year   
old female who presents today   
for CC of frequency and burning   
with urination  
Positive for Dysuria and   
Increase in frequency of   
urination, Negative for Urgency,   
Sense of incomplete void,   
Fevers, Vomiting, Diarrhea,   
Abdominal pain , Back/Flank   
pain, Blood in urine and Vaginal   
itch or discharge  
Chance of Pregnancy: No  
Last intercourse: n/a  
Any self-treatment attempted: No  
Number of previous UTI's in last   
6 months:0  
Number of previous UTI's in last   
12 months: 0  
Aggravating Factors: voiding  
Alleviating Factors include   
Increasing fluids with no relief   
in symptoms.  
Patient is a diabetic and BS   
have been high. Aware that this   
may be causing symptoms  
/82   Pulse 102   Temp 37   
C (98.6 F)   Resp 21   Wt 73.1   
kg (161 lb 3.2 oz)   LMP   
05/15/2022 (Approximate)   SpO2   
99%   BMI 28.11 kg/m  
Social History  
  
Tobacco Use  
Smoking status: Current Every   
Day Smoker  
Packs/day: 0.50  
Years: 10.00  
Pack years: 5.00  
Types: Cigarettes  
Last attempt to quit: 2009  
Years since quittin.0  
Smokeless tobacco: Never Used  
Tobacco comment: 5-7 ciggs/day  
Vaping Use  
Vaping Use: Never used  
Substance Use Topics  
Alcohol use: Yes  
Comment: rarely  
Drug use: No  
  
PAST MEDICAL HISTORY  
Diagnosis Date  
Anemia  
Anxiety  
Autism spectrum  
Bipolar I disorder, most recent   
episode (or current) manic, mild   
(HCC)  
Diabetes type 2, controlled   
(HCC)  
Fragile x chromosome  
Herpes labialis  
Oral herpes  
History of epilepsy  
History of migraine  
History of tobacco use  
Other and unspecified ovarian   
cyst  
Ovarian cyst  
PTSD (post-traumatic stress   
disorder)  
Unspecified , without   
mention of complication,   
unspecified  
6 Miscarriages  
Unspecified asthma(493.90)  
Urge incontinence  
Korey Saleem  
  
I have confirmed and edited as   
necessary, the [facility]  
  
Review of Systems  
Constitutional: Negative for   
chills and fever.  
Gastrointestinal: Negative for   
abdominal pain, diarrhea, nausea   
and vomiting.  
Genitourinary: Positive for   
dysuria. Negative for flank   
pain, frequency, hematuria and   
urgency.  
  
Objective  
Physical Exam  
Vitals and nursing note   
reviewed.  
Constitutional:  
Appearance: Normal appearance.  
Abdominal:  
General: Bowel sounds are   
normal. There is no abdominal   
bruit.  
Palpations: Abdomen is not   
rigid. There is no mass or   
pulsatile mass.  
Tenderness: There is no   
abdominal tenderness. There is   
no guarding or rebound. Negative   
signs include Almeida's sign and   
McBurney's sign.  
Neurological:  
Mental Status: She is alert and   
oriented to person, place, and   
time.  
Psychiatric:  
Mood and Affect: Affect normal.  
  
ASSESSMENT/PLAN:  
1. Urinary frequency - ICD9:   
788.41, ICD10: R35.0 (primary   
diagnosis)  
acute  
- Send urine for culture  
- Urine sent for culture  
- If negative referral in for   
urology  
- Advise follow unit(s) pwith   
PCP for bs management.  
- UA DIP, URINE (POC)  
- URINE CULTURE  
- URINALYSIS, WITH MICROSCOPIC  
- CONSULT TO UROLOGY  
  
2. Acute vaginitis - ICD9:   
616.10, ICD10: N76.0  
Will send urine for culture,   
notify of findings and treatment   
needed  
- CANDIDA / TRICHOMONAS   
AMPLIFICATION  
- BACTERIAL VAGINOSIS   
AMPLIFICATION  
  
Diagnosis and treatment plan   
were discussed and questions   
were answered to the patient's   
satisfaction. Pt acknowledged   
understanding of concepts and   
follow up plan.  
Specific signs and symptoms that   
would indicate the need for   
higher level of care were   
discussed in detail warranting   
prompt ER evaluation.  
Marce Luevano APRN.CNP  
  
  
  
Electronically signed by Marce Luevano APRN.CNP at 2022   
6:48 PM EDTdocumented in this   
encounter                               [facility]  
   
                                        2022 Instructions   
  
  
Marce Luevano APRN.CNP -   
2022 6:12 PM EDT  
  
  
Formatting of this note might be   
different from the original.  
Will send urine for culture,   
will send vaginal cultures.  
  
If all negative, will refer to   
urology for further treatment  
  
-Follow up with PCP or return to   
clinic if symptoms not improving   
in 3 days or if you develop any   
new (or worsening) symptoms such   
as fever, chills or back pain go   
to ER.  
  
  
  
Electronically signed by Marce Luevano APRN.CNP at 2022   
6:15 PM EDT  
documented in this encounter            [facility]  
   
                                                    07- History of Presen  
t   
illness Narrative                         
  
  
Formatting of this note might be   
different from the original.  
Patient called and rescheduled   
for 4pm appointment with PCP.   
Assisted with step by step to   
get onto video call. Patient   
with short patience and very   
agitated. Patient had to redo   
precheck in questions, very   
frustrated with doing so. This   
nurse waited on phone with   
patient to make sure she would   
be able to log on to virtual   
appointment. Patient upset and   
saying  it still isn't freaking   
working, just forget it, just   
cancel the appointment,    
Appointment cancelled per   
patients request. Phone call   
ended.  
Britni Chang LPN  
  
  
  
Electronically signed by   
Britni Chang LPN at   
07/15/2022 4:37 PM EDT  
  
  
Formatting of this note might be   
different from the original.  
Patient could not sign on for   
virtual visit. Our nurse called   
to walk her through the process   
and patient became rude and   
agitated and refused help with   
rescheduling appointment and   
hung up.  
  
  
Electronically signed by   
Dru Lam MD at   
07/15/2022 4:37 PM EDTdocumented   
in this encounter                       [facility]  
   
                                                    2022 Miscellaneous   
Notes                                     
  
  
Formatting of this note might be   
different from the original.  
Spoke with patient via telephone   
and advised she would need to be   
seen in office to address UTI   
concerns. Patient became   
immediately upset and stated  I   
told the lady I talked to this   
morning that I couldn't come in!   
I don't have a car or a ride and   
I'm not walking that far!    
Patient stated  Just cancel the   
appointment. I guess I'll just   
die.  Asked patient if she was   
within walking distance of an   
urgent care and she stated  You   
guys are the closest and I'm not   
walking there!  Again explained   
to patient that her urine would   
need checked and if indicative   
of a UTI a culture would need   
done that advises what   
antibiotic to treat with.   
Patient stated  Just cancel the   
appointment. And let me know if   
tomorrows appt needs cancelled   
too, it's virtual also.  Advised   
patient this nurse will call   
back and advise.  
  
  
Electronically signed by Chandni Dailey LPN at 2022   
11:44 AM EDTdocumented in this   
encounter                               [facility]  
   
                                                    2022 Miscellaneous   
Notes                                     
  
  
Formatting of this note might be   
different from the original.  
Patient is a no show for initial   
PharmD visit today. LMOM to   
return call to office to   
reschedule.  
  
Rossi Nation, Thalia, BCPS  
Primary Care Clinical Pharmacist  
[facility]  
  
  
  
Electronically signed by Rossi Nation RPh at 2022 1:41 PM   
EDTdocumented in this encounter         [facility]  
   
                                        2022 Instructions   
  
  
Raj Gant APRN.CNP -   
2022 11:48 AM EDT  
  
  
Formatting of this note might be   
different from the original.  
1. Restart metformin  
2. Continue taking other   
diabetes medication.  
3. Toradol given for migraine  
  
BOLIVAR Reinoso.KATHLEEN  
  
  
  
Electronically signed by Raj Gant APRN.CNP at 2022   
11:52 AM EDT  
documented in this encounter            [facility]  
   
                                                    2022 History of Presen  
t   
illness Narrative                         
  
  
Formatting of this note is   
different from the original.  
Chief Complaint  
Patient presents with:  
Acute Visit  
  
HPI  
Frankie Mckeon is a 31 year old   
female who presents here today   
for Above Complaints..  
  
Patient is here for multiple   
complaints. Including urinary   
frequency. Patient does not have   
any dysuria. Patient is a   
uncontrolled diabetic. Last   
hemoglobin A1c was 12.2%. She   
has upcoming visits with   
structured diabetic management   
program and her PCP on 07/15.   
Patient stating that they have   
been in the 300s in the morning   
and at night. She has not taken   
her metformin for about 5 days   
as she believed that her PCP   
instructed her. She is not   
interested in any injected   
medications for diabetes.  
  
She also has history of   
migraines. She currently has a   
migraine that has been lasting   
for about 5 days. She is   
prescribed Imitrex, which she   
used but did not work. Remarks   
that today her migraine is mild.   
No aura or new neurologic   
symptoms accompanying this   
migraine. No nausea, vomiting or   
visual changes. Initially had   
some phonophobia and   
photophobia.  
  
Past medical history,   
appointments, medications,   
allergies reviewed.  
  
Previous Medical History  
PAST MEDICAL HISTORY  
Diagnosis Date  
Anemia  
Anxiety  
Autism spectrum  
Bipolar I disorder, most recent   
episode (or current) manic, mild   
(HCC)  
Diabetes type 2, controlled   
(HCC)  
Fragile x chromosome  
Herpes labialis  
Oral herpes  
History of epilepsy  
History of migraine  
History of tobacco use  
Other and unspecified ovarian   
cyst  
Ovarian cyst  
PTSD (post-traumatic stress   
disorder)  
Unspecified , without   
mention of complication,   
unspecified  
6 Miscarriages  
Unspecified asthma(493.90)  
Urge incontinence  
Korey Saleem  
  
Previous Surgical History  
PAST SURGICAL HISTORY  
Procedure Laterality Date  
NONE  
  
Family History  
FAMILY HISTORY  
Adopted: Yes  
Problem Relation Age of Onset  
Heart Mother  
None Father  
UNKNOWN  
Colon Cancer Other  
unsure who  
Cancer Other  
lung, liver (unsure who was who)  
  
Patient Allergies  
ALLERGIES  
Allergen Reactions  
Ambien [Zolpidem] Mental Status   
Change  
Hallucinations (visual)  
Latex Itching  
Sulfa (Sulfonamide * Hives  
  
Current Medications  
Current Outpatient Medications   
on File Prior to Visit  
Medication Sig  
metFORMIN ER (GLUCOPHAGE XR) 500   
mg 24 hr tablet Take 2 tablets   
by mouth twice daily.  
glipiZIDE (GLUCOTROL XL) 10mg 24   
hr tablet Take 1 tablet by mouth   
once daily.  
empagliflozin (JARDIANCE) 10 mg   
tablet Take 1 tablet by mouth   
daily with breakfast.  
desmopressin acetate (DDAVP) 0.2   
mg tablet Take 1 tablet by mouth   
once daily.  
folic acid 1 mg tablet Take 2   
tablets by mouth once daily.  
SUMAtriptan (IMITREX) 100 mg   
tablet Take 1 tablet by mouth as   
needed.  
sertraline (ZOLOFT) 50 mg tablet   
Take 1 tablet by mouth once   
daily.  
oxybutynin ER (DITROPAN XL) 10   
mg 24 hr tablet Take 1 tablet by   
mouth once daily.  
albuterol HFA (VENTOLIN HFA) 90   
mcg/actuation inhaler Inhale 2   
Puffs as instructed every 4   
hours as needed for   
Wheezing/Shortness of Breath.  
L.acid/L.casei/B.bif/B.melinda/FOS   
(PROBIOTIC BLEND ORAL) Take by   
mouth.  
prenatal 25/iron fum/folic/dha   
(PRENATAL-1 ORAL) Take by mouth.  
lamoTRIgine (LAMICTAL) 100 mg   
tablet Take 1 tablet by mouth   
once daily.  
acetaminophen (TYLENOL) 500 mg   
tablet Take 500 mg by mouth   
every 8 hours as needed.  
Blood-Glucose Meter,Continuous   
(DEXCOM G6 ) misc 1   
Device four times daily.  
Blood-Glucose Sensor (DEXCOM G6   
SENSOR) anderson 4 Devices four   
times daily.  
propranolol (INDERAL) 20 mg   
tablet Take 1 tablet by mouth   
twice daily.  
blood sugar diagnostic (BLOOD   
GLUCOSE TEST) test strip Test   
blood sugar(s) 1-2 times daily.   
Dx: Type 2 DM - Controlled E11.9   
Insulin: No  
Diaper,Brief, Adult,Disposable   
(ADULT BRIEFS - MEDIUM) misc 1   
application once daily. Patient   
needs pull-up type, no tabs  
1 Application once daily  
  
No current facility-administered   
medications on file prior to   
visit.  
  
Social History  
Social History  
  
Tobacco Use  
Smoking status: Current Every   
Day Smoker  
Packs/day: 0.50  
Years: 10.00  
Pack years: 5.00  
Types: Cigarettes  
Last attempt to quit: 2009  
Years since quittin.0  
Smokeless tobacco: Never Used  
Tobacco comment: 5-7 ciggs/day  
Vaping Use  
Vaping Use: Never used  
Substance Use Topics  
Alcohol use: Yes  
Comment: rarely  
Drug use: No  
  
REVIEW OF SYSTEMS: as above  
  
Reviewed relevant PMHx, PSHx,   
Social Hx, current medications   
and allergies.  
  
EXAM:  
/86   Pulse 86   Temp 36.1   
C (96.9 F) (Left Tympanic)     
Resp 16   Wt 72.6 kg (160 lb)     
LMP 05/15/2022 (Approximate)     
BMI 27.90 kg/m  
  
General Appearance: Well   
appearing, alert, in no acute   
distress, well-hydrated, well   
nourished..  
Eyes: Anicteric sclera. Pupils   
are equally round and reactive   
to light. Extraocular movements   
are intact. .  
Lungs: Lungs clear to   
auscultation. No wheezing,   
rhonchi, rales..  
Heart: RRR without murmur,   
gallop, or rubs. No ectopy.  
Abdomen: Normal abdominal exam,   
Abdomen soft, non-tender. Bowel   
sounds normal. No masses,   
organomegaly.  
Neurologic: Gait normal.   
Reflexes normal and symmetric.   
Sensation grossly intact..  
  
Health Maintenance List  
COVID-19 VACCINE(1) Never done  
PNEUMOCOCCAL(2 - PCV) due on   
10/15/2019  
DILATED RETINAL EXAM due on   
2021  
INFLUENZA(1) due on 2022  
HBA1C due on 09/15/2022  
DEPRESSION SCREENING due on   
2023  
DIABETIC FOOT EXAM due on   
2023  
ANNUAL PCP TEAM CHRONIC DISEASE   
VISIT due on 2023  
URINE ALBUMIN:CREATININE RATIO   
due on 06/15/2023  
LDL CHOLESTEROL due on   
06/15/2023  
PAP TESTING due on 2025  
HPV TESTING due on 2025  
DTAP,TDAP,TD(3 - Td or Tdap) due   
on 2030  
HEPATITIS C SCREENING Completed  
HIV SCREENING Completed  
SPIROMETRY Discontinued  
  
Data reviewed  
Component Latest Ref Rng & Units   
6/15/2022  
Protein, Total 6.3 - 8.0 g/dL   
6.4  
Albumin 3.9 - 4.9 g/dL 4.2  
Calcium 8.5 - 10.2 mg/dL 9.9  
Bilirubin, Total 0.2 - 1.3 mg/dL   
0.3  
Alkaline Phosphatase 34 - 123   
U/L 63  
AST 13 - 35 U/L 14  
ALT 7 - 38 U/L 17  
Glucose 74 - 99 mg/dL 325 (H)  
BUN 7 - 21 mg/dL 9  
Creatinine 0.58 - 0.96 mg/dL   
0.73  
Sodium 136 - 144 mmol/L 138  
Potassium 3.7 - 5.1 mmol/L 4.8  
Chloride 97 - 105 mmol/L 101  
CO2 22 - 30 mmol/L 26  
Anion Gap 9 - 18 mmol/L 11  
eGFR >=60 mL/min/1.73m 112  
WBC 3.70 - 11.00 k/uL 7.22  
RBC 3.90 - 5.20 m/uL 4.59  
Hemoglobin 11.5 - 15.5 g/dL 13.6  
Hematocrit 36.0 - 46.0 % 42.0  
MCV 80.0 - 100.0 fL 91.5  
MCH 26.0 - 34.0 pg 29.6  
MCHC 30.5 - 36.0 g/dL 32.4  
RDW-CV 11.5 - 15.0 % 12.5  
Platelet Count 150 - 400 k/uL   
196  
MPV 9.0 - 12.7 fL 11.6  
Absolute nRBC <0.01 k/uL <0.01  
Total Cholesterol, Nonfasting   
<200 mg/dL 158  
Triglycerides, Nonfasting <150   
mg/dL 242 (H)  
HDL Cholesterol, Nonfasting >39   
mg/dL 34 (L)  
LDL Cholesterol, Nonfasting <100   
mg/dL 76  
Non HDL Cholesterol, Nonfasting   
<130 mg/dL 124  
VLDL Cholesterol, Nonfasting <30   
mg/dL 48 (H)  
Total Chol/HDL Ratio, Nonfasting   
<5.10 mg/dL 4.65  
LDL/HDL Ratio, Nonfasting <2.54   
mg/dL 2.24  
Creatinine, Ur Random (UCRR)   
20.0 - 300.0 mg/dL 71.8  
Albumin, Urine Random mg/L <12.0  
Albumin/Creat Ratio <30 mg/g <17  
Hemoglobin A1C 4.3 - 5.6 % 12.2   
(H)  
Estimated Average Glucose mg/dL   
303  
  
Component Latest Ref Rng & Units   
2022  
GLUCOSE UA (POCT) Negative mg/dL   
>=1000 (A)  
BILIRUBIN UA (POCT) Negative   
Negative  
KETONE UA (POCT) Negative mg/dL   
Negative  
SPECIFIC GRAVITY UA (POCT) 1.005   
- 1.030 1.020  
HEMOGLOBIN/BLOOD UA (POCT)   
Negative Large (A)  
PH UA (POCT) 4.5 - 8.0 6.0  
PROTEIN UA (POCT) Negative mg/dL   
100 (A)  
UROBILINOGEN UA (POCT) Normal   
E.U./dL 1.0  
NITRITE UA (POCT) Negative   
Negative  
LEUKOCYTES UA (POCT) Negative   
Negative  
COLOR UA (POCT) Dark yellow  
CLARITY UA (POCT) Cloudy  
  
ASSESSMENT/PLAN:  
1. Uncontrolled type 2 diabetes   
mellitus with hyperglycemia   
(HCC) - ICD9: 250.02, ICD10:   
E11.65 (primary diagnosis)  
- Uncontrolled. Discussed that   
her urinary symptoms are   
secondary to her poorly   
controlled diabetes. I will have   
her restart metformin as she had   
stopped for the past week. Had a   
lengthy discussion that she   
should consider an injectable   
Ozempic or Trulicity, but she   
states that she will not be   
agreeable to anything with a   
needle. I discussed that her   
options are limited.  
  
2. Urinary frequency - ICD9:   
788.41, ICD10: R35.0  
- No UTI today. Glucose, protein   
and blood present (patient is on   
her menses). Discussing that   
this is secondary to her   
diabetes that is not well   
controlled  
- UA DIP, URINE (POC)  
- URINALYSIS, WITH MICROSCOPIC  
  
3. Migraine without aura and   
without status migrainosus, not   
intractable - ICD9: 346.10,   
ICD10: G43.009  
- History  
- KETOROLAC 60 MG/2 ML   
INTRAMUSCULAR SOLUTION  
  
BOLIVAR Reinoso.CNP  
  
RTO as needed. Keep upcoming   
visits.  
  
This note was partly generated   
using Dragon voice recognition   
dictation and may contain some   
misspelled or inaccurate words   
missed on review.  
  
  
  
Electronically signed by Raj Gant APRN.CNP at 2022   
12:50 PM EDTdocumented in this   
encounter                               [facility]  
   
                                                    2022 Miscellaneous   
Notes                                     
  
  
Formatting of this note might be   
different from the original.  
Patient calls and notified of   
results and providers   
instructions. Patient verbalizes   
understanding.  
  
Ml Jain RN  
  
  
  
Electronically signed by Ml Jain RN at 2022   
3:13 PM EDT  
  
  
Formatting of this note might be   
different from the original.  
Phoned patient and VM left for   
her to return call and speak   
with a nurse to be updated with   
results and recommendations.  
  
  
Electronically signed by Chandni Dailey LPN at 2022 3:08   
PM EDT  
  
  
Formatting of this note might be   
different from the original.  
----- Message from Dru Lam MD sent at 2022   
9:34 AM EDT -----  
Urine studies are normal.  
  
Diabetes uncontrolled as   
expected based on her home   
readings. A1C now 12.2.   
recommend she follow up with   
diabetes structured program and   
continue medications and   
diabetic diet as discussed.  
  
Other labs unremarkable. Will   
discuss further at follow up in   
July.  
  
  
Electronically signed by Chandni Dailey LPN at 2022 3:07   
PM EDTdocumented in this   
encounter                               [facility]  
   
                                                    2022 Miscellaneous   
Notes                                     
  
  
Formatting of this note might be   
different from the original.  
Pt call to state the pharmacy   
told her she needed a PA for the   
Dexcom. I called the pharmacy &   
spoke to Jacinto, the pharmacist &   
he states the problem is the Rx   
was written for a Dexcom sensor   
&amp;  G4-G5. That model   
is no longer avail, it is now a   
Dexcom G6. New Rx pending.  
  
Chandni Paez LPN  
  
  
  
Electronically signed by Chandni Paez LPN at 2022 4:25   
PM EDTdocumented in this   
encounter                               [facility]  
   
                                                    2022 Miscellaneous   
Notes                                     
  
  
Formatting of this note might be   
different from the original.  
Pt sent [employer] message   
notifying her supplies have been   
sent into [facility] on 22.   
Update office is issues.  
  
Karen Jonas Ma  
  
  
  
Electronically signed by Karen Jonas Ma at 2022 12:29 PM   
EDT  
  
  
Formatting of this note might be   
different from the original.  
Patient calling to check the   
status of her refill. Patient   
would like a return call when   
complete. If Patient does not   
answer, ok to leave message.  
  
  
Electronically signed by Kath Arizmendi at 2022 12:07 PM   
EDT  
  
  
Formatting of this note is   
different from the original.  
Patient has been identified by   
name and date of birth: Yes  
Patient phones for refill(s):  
Pending Prescriptions Disp   
Refills  
DEXCOM G5-G4 SENSOR DEVICE 4   
Each 4  
Sig: Take blood sugar 4 times a   
day  
SANA: No  
DEXCOM G5  1 Each 4  
Sig: Check blood sugar 4 times a   
day  
SANA: No  
  
  
Date of last office visit in   
primary care: 3/22/22  
  
Please advise. Thank you.   
Neisha Lamb LPN  
  
  
  
  
Electronically signed by   
Neisha Lamb LPN at   
2022 5:24 PM EDTdocumented   
in this encounter                       [facility]  
   
                                                    2022 History of Presen  
t   
illness Narrative                         
  
  
Formatting of this note is   
different from the original.  
3/22/2022  
Patient presents with:  
Diarrhea  
  
SUBJECTIVE: This is a 31 year   
old that is here today for Above   
Complaints.  
  
Patient needs a paper needs to   
use bathroom frequently. She   
reports ever since she has been   
on metformin she has had loose   
stools. She is unable to tell me   
how often she is having loose   
stools.  
  
PAST MEDICAL HISTORY  
Diagnosis Date  
Anemia  
Anxiety  
Autism spectrum  
Bipolar I disorder, most recent   
episode (or current) manic, mild   
(HCC)  
Diabetes type 2, controlled   
(HCC)  
Fragile x chromosome  
Herpes labialis  
Oral herpes  
History of epilepsy  
History of migraine  
History of tobacco use  
Other and unspecified ovarian   
cyst  
Ovarian cyst  
PTSD (post-traumatic stress   
disorder)  
Unspecified , without   
mention of complication,   
unspecified  
6 Miscarriages  
Unspecified asthma(493.90)  
Urge incontinence  
Korey Saleem  
  
ALLERGIES Ambien [Zolpidem],   
Latex, and Sulfa (Sulfonamide   
Antibiotics)  
MEDICATIONS  
Current Outpatient Medications  
Medication Sig  
desmopressin acetate (DDAVP) 0.2   
mg tablet Take 1 tablet by mouth   
once daily.  
glipiZIDE (GLUCOTROL XL) 5 mg 24   
hr tablet Take 1 tablet by mouth   
once daily.  
propranolol (INDERAL) 20 mg   
tablet Take 1 tablet by mouth   
twice daily.  
folic acid 1 mg tablet Take 2   
tablets by mouth once daily.  
SUMAtriptan (IMITREX) 100 mg   
tablet Take 1 tablet by mouth as   
needed.  
sertraline (ZOLOFT) 50 mg tablet   
Take 1 tablet by mouth once   
daily.  
metFORMIN (GLUCOPHAGE) 1,000 mg   
tablet Take 1 tablet by mouth   
twice daily with meals.  
methylPREDNISolone (MEDROL,   
ISIAH,) 4 mg Dose-Pack Take by   
mouth per package instructions  
Blood-Glucose Sensor (DEXCOM   
G5-G4 SENSOR) anderson Take blood   
sugar 4 times a day  
Blood-Glucose Meter,Continuous   
(DEXCOM G5 ) misc Check   
blood sugar 4 times a day  
naproxen (NAPROSYN) 500 mg   
tablet Take 1 tablet by mouth   
twice daily as needed for Pain.   
Take with food.  
Diaper,Brief, Adult,Disposable   
(ADULT BRIEFS - MEDIUM) 1 brief   
at night  
oxybutynin ER (DITROPAN XL) 10   
mg 24 hr tablet Take 1 tablet by   
mouth once daily.  
albuterol HFA (VENTOLIN HFA) 90   
mcg/actuation inhaler Inhale 2   
Puffs as instructed every 4   
hours as needed for   
Wheezing/Shortness of Breath.  
blood sugar diagnostic (BLOOD   
GLUCOSE TEST) test strip Test   
blood sugar(s) 1-2 times daily.   
Dx: Type 2 DM - Controlled E11.9   
Insulin: No  
L.acid/L.casei/B.bif/B.melinda/FOS   
(PROBIOTIC BLEND ORAL) Take by   
mouth.  
prenatal 25/iron fum/folic/dha   
(PRENATAL-1 ORAL) Take by mouth.  
lamoTRIgine (LAMICTAL) 100 mg   
tablet Take 1 tablet by mouth   
once daily.  
Diaper,Brief, Adult,Disposable   
(ADULT BRIEFS - MEDIUM) misc 1   
application once daily. Patient   
needs pull-up type, no tabs  
1 Application once daily  
acetaminophen (TYLENOL) 500 mg   
tablet Take 500 mg by mouth   
every 8 hours as needed.  
  
No current facility-administered   
medications for this visit.  
  
Medications and allergies   
reviewed by this provider.  
SOCIAL HISTORY  
Social History  
  
Tobacco Use  
Smoking status: Current Every   
Day Smoker  
Packs/day: 0.50  
Years: 10.00  
Pack years: 5.00  
Types: Cigarettes  
Last attempt to quit: 2009  
Years since quittin.7  
Smokeless tobacco: Never Used  
Tobacco comment: 5-7 ciggs/day  
Vaping Use  
Vaping Use: Never used  
Substance Use Topics  
Alcohol use: Yes  
Comment: rarely  
Drug use: No  
  
REVIEW OF SYSTEMS  
All other reviewed and negative   
other than HPI.  
  
OBJECTIVE:  
LMP 2021 . Vital signs   
reviewed by this provider.  
APPEARANCE Well appearing,   
alert, in no acute distress,   
well-hydrated, well nourished.   
Able to answer all questions   
without difficulty.  
  
COVID-19 VACCINE(1) Never done  
DILATED RETINAL EXAM due on   
2021  
LDL CHOLESTEROL due on   
2021  
URINE ALBUMIN:CREATININE RATIO   
due on 2021  
INFLUENZA(1) due on 2021  
HBA1C due on 2021  
DIABETIC FOOT EXAM due on   
2022  
ANNUAL PCP TEAM CHRONIC DISEASE   
VISIT due on 2023  
DEPRESSION SCREENING due on   
2023  
PAP TESTING due on 2025  
HPV TESTING due on 2025  
DTAP,TDAP,TD(3 - Td or Tdap) due   
on 2030  
ONE PNEUMOVAX PRIOR TO AGE 64   
Completed  
HEPATITIS C SCREENING Completed  
HIV SCREENING Completed  
MENINGOCOCCAL CONJUGATE Aged Out  
SPIROMETRY Discontinued  
  
  
ASSESSMENT/PLAN:  
1. Uncontrolled type 2 diabetes   
mellitus with hyperglycemia   
(HCC) - ICD9: 250.02, ICD10:   
E11.65 (primary diagnosis)  
- discussed with patient she   
needs to complete blood work and   
make office appointment for   
follow-up, verbalizes   
understanding  
- will change metformin to   
extended release to see if this   
will help with loose stools- too   
note patient has been on   
metformin for a few years  
- HGB A1C  
- COMP METABOLIC PANEL  
- METFORMIN  MG   
TABLET,EXTENDED RELEASE 24 HR  
  
2. Screening for hyperlipidemia   
- ICD9: V77.91, ICD10: Z13.220  
- LIPID PANEL BASIC  
  
3. Loose stools - ICD9: 787.7,   
ICD10: R19.5  
- increase fiber in diet  
- change metformin to extended   
release to see if this helps  
- METFORMIN  MG   
TABLET,EXTENDED RELEASE 24 HR  
- needs office appointment if   
persists  
- letter provided for employer  
  
Natalia Ospina APRN.CNP  
  
Prescription instructions   
reviewed with patient as   
applicable. Patient advised if   
symptoms do not improve or if   
symptoms worsen sooner, to   
contact their primary care   
physician. Potential red flag   
symptoms discussed with the   
patient. Reviewed appropriate   
action plan to take if red flag   
symptoms occur. Patient   
agreeable to treatment plan.  
  
  
  
  
  
  
Electronically signed by Natalia Ospina APRN.CNP at 2022   
2:25 PM EDTdocumented in this   
encounter                               [facility]  
  
  
  
                                          
   
                                          
   
                                          
   
                                          
   
                                          
   
                                          
   
                                          
   
                                          
  
documented as of this encounter (statuses as of 2022)  
[facility]- History of Past illness Narrative*   
  
                                Problem         Noted Date      Resolved Date  
   
                                Asthmatic bronchitis 2018  
   
                                Anemia, unspecified 2008  
   
                                                      
  
  
Overview:  
  
  
Formatting of this note might be different from the original.  
Started on iron otc  (she's not sure of exact date)  
Unable to tolerate being off of it (severe fatigue reported)   
   
                                Continuous tobacco abuse 2008  
020  
   
                                                      
  
  
Overview:  
  
  
Formatting of this note might be different from the original.  
Unable to guess how much she smokes   
   
                                Tobacco use                     2020  
  
documented as of this encounter (statuses as of 2022)  
[facility]- History of Past illness Narrative*   
  
                                Problem         Noted Date      Resolved Date  
   
                                Asthmatic bronchitis 2018  
   
                                Anemia, unspecified 2008  
   
                                                      
  
  
Overview:  
  
  
Formatting of this note might be different from the original.  
Started on iron otc  (she's not sure of exact date)  
Unable to tolerate being off of it (severe fatigue reported)   
   
                                Continuous tobacco abuse 2008  
020  
   
                                                      
  
  
Overview:  
  
  
Formatting of this note might be different from the original.  
Unable to guess how much she smokes   
   
                                Tobacco use                     2020  
  
documented as of this encounter (statuses as of 2022)  
[facility]- History of Past illness Narrative*   
  
                                Problem         Noted Date      Resolved Date  
   
                                Asthmatic bronchitis 2018  
   
                                Anemia, unspecified 2008  
   
                                                      
  
  
Overview:  
  
  
Formatting of this note might be different from the original.  
Started on iron otc  (she's not sure of exact date)  
Unable to tolerate being off of it (severe fatigue reported)   
   
                                Continuous tobacco abuse 2008  
020  
   
                                                      
  
  
Overview:  
  
  
Formatting of this note might be different from the original.  
Unable to guess how much she smokes   
   
                                Tobacco use                     2020  
  
documented as of this encounter (statuses as of 2022)  
[facility]- History of Past illness Narrative*   
  
                                Problem         Noted Date      Resolved Date  
   
                                Asthmatic bronchitis 2018  
   
                                Anemia, unspecified 2008  
   
                                                      
  
  
Overview:  
  
  
Formatting of this note might be different from the original.  
Started on iron otc  (she's not sure of exact date)  
Unable to tolerate being off of it (severe fatigue reported)   
   
                                Continuous tobacco abuse 2008  
020  
   
                                                      
  
  
Overview:  
  
  
Formatting of this note might be different from the original.  
Unable to guess how much she smokes   
   
                                Tobacco use                     2020  
  
documented as of this encounter (statuses as of 2022)  
[facility]- History of Past illness Narrative*   
  
                                Problem         Noted Date      Resolved Date  
   
                                Asthmatic bronchitis 2018  
   
                                Anemia, unspecified 2008  
   
                                                      
  
  
Overview:  
  
  
Formatting of this note might be different from the original.  
Started on iron otc  (she's not sure of exact date)  
Unable to tolerate being off of it (severe fatigue reported)   
   
                                Continuous tobacco abuse 2008  
020  
   
                                                      
  
  
Overview:  
  
  
Formatting of this note might be different from the original.  
Unable to guess how much she smokes   
   
                                Tobacco use                     2020  
  
documented as of this encounter (statuses as of 2022)  
[facility]- History of Past illness Narrative*   
  
                                Problem         Noted Date      Resolved Date  
   
                                Asthmatic bronchitis 2018  
   
                                Anemia, unspecified 2008  
   
                                                      
  
  
Overview:  
  
  
Formatting of this note might be different from the original.  
Started on iron otc  (she's not sure of exact date)  
Unable to tolerate being off of it (severe fatigue reported)   
   
                                Continuous tobacco abuse 2008  
020  
   
                                                      
  
  
Overview:  
  
  
Formatting of this note might be different from the original.  
Unable to guess how much she smokes   
   
                                Tobacco use                     2020  
  
documented as of this encounter (statuses as of 07/15/2022)  
[facility]- History of Past illness Narrative*   
  
                                Problem         Noted Date      Resolved Date  
   
                                Asthmatic bronchitis 2018  
   
                                Anemia, unspecified 2008  
   
                                                      
  
  
Overview:  
  
  
Formatting of this note might be different from the original.  
Started on iron otc  (she's not sure of exact date)  
Unable to tolerate being off of it (severe fatigue reported)   
   
                                Continuous tobacco abuse 2008  
020  
   
                                                      
  
  
Overview:  
  
  
Formatting of this note might be different from the original.  
Unable to guess how much she smokes   
   
                                Tobacco use                     2020  
  
documented as of this encounter (statuses as of 2022)  
[facility]- History of Past illness Narrative*   
  
                                Problem         Noted Date      Resolved Date  
   
                                Asthmatic bronchitis 2018  
   
                                Anemia, unspecified 2008  
   
                                                      
  
  
Overview:  
  
  
Formatting of this note might be different from the original.  
Started on iron otc  (she's not sure of exact date)  
Unable to tolerate being off of it (severe fatigue reported)   
   
                                Continuous tobacco abuse 2008  
020  
   
                                                      
  
  
Overview:  
  
  
Formatting of this note might be different from the original.  
Unable to guess how much she smokes   
   
                                Tobacco use                     2020  
  
documented as of this encounter (statuses as of 2022)  
[facility]- History of Past illness Narrative*   
  
                                Problem         Noted Date      Resolved Date  
   
                                Asthmatic bronchitis 2018  
   
                                Anemia, unspecified 2008  
   
                                                      
  
  
Overview:  
  
  
Formatting of this note might be different from the original.  
Started on iron otc  (she's not sure of exact date)  
Unable to tolerate being off of it (severe fatigue reported)   
   
                                Continuous tobacco abuse 2008  
020  
   
                                                      
  
  
Overview:  
  
  
Formatting of this note might be different from the original.  
Unable to guess how much she smokes   
   
                                Tobacco use                     2020  
  
documented as of this encounter (statuses as of 2022)  
[facility]- History of Past illness Narrative*   
  
                                Problem         Noted Date      Resolved Date  
   
                                Asthmatic bronchitis 2018  
   
                                Anemia, unspecified 2008  
   
                                                      
  
  
Overview:  
  
  
Formatting of this note might be different from the original.  
Started on iron otc  (she's not sure of exact date)  
Unable to tolerate being off of it (severe fatigue reported)   
   
                                Continuous tobacco abuse 2008  
020  
   
                                                      
  
  
Overview:  
  
  
Formatting of this note might be different from the original.  
Unable to guess how much she smokes   
   
                                Tobacco use                     2020  
  
documented as of this encounter (statuses as of 2022)  
[facility]- History of Past illness Narrative*   
  
                                Problem         Noted Date      Resolved Date  
   
                                Asthmatic bronchitis 2018  
   
                                Anemia, unspecified 2008  
   
                                                      
  
  
Overview:Formatting of this note might be different from the original.  
Started on iron otc  (she's not sure of exact date)  
Unable to tolerate being off of it (severe fatigue reported)  
   
   
                                Continuous tobacco abuse 2008  
020  
   
                                                      
  
  
Overview:Formatting of this note might be different from the original.  
Unable to guess how much she smokes  
   
   
                                Tobacco use                     2020  
  
documented as of this encounter (statuses as of 10/17/2022)  
[facility]- History of Past illness Narrative*   
  
                                Problem         Noted Date      Resolved Date  
   
                                Asthmatic bronchitis 2018  
   
                                Anemia, unspecified 2008  
   
                                                      
  
  
Overview:Formatting of this note might be different from the original.  
Started on iron otc  (she's not sure of exact date)  
Unable to tolerate being off of it (severe fatigue reported)  
   
   
                                Continuous tobacco abuse 2008  
020  
   
                                                      
  
  
Overview:Formatting of this note might be different from the original.  
Unable to guess how much she smokes  
   
   
                                Tobacco use                     2020  
  
documented as of this encounter (statuses as of 10/17/2022)  
[facility]- History of Past illness Narrative*   
  
                                Problem         Noted Date      Resolved Date  
   
                                Asthmatic bronchitis 2018  
   
                                Anemia, unspecified 2008  
   
                                                      
  
  
Overview:Formatting of this note might be different from the original.  
Started on iron otc  (she's not sure of exact date)  
Unable to tolerate being off of it (severe fatigue reported)  
   
   
                                Continuous tobacco abuse 2008  
020  
   
                                                      
  
  
Overview:Formatting of this note might be different from the original.  
Unable to guess how much she smokes  
   
   
                                Tobacco use                     2020  
  
documented as of this encounter (statuses as of 10/17/2022)  
[facility]- History of Past illness Narrative*   
  
                                Problem         Noted Date      Resolved Date  
   
                                Asthmatic bronchitis 2018  
   
                                Anemia, unspecified 2008  
   
                                                      
  
  
Overview:Formatting of this note might be different from the original.  
Started on iron otc  (she's not sure of exact date)  
Unable to tolerate being off of it (severe fatigue reported)  
   
   
                                Continuous tobacco abuse 2008  
020  
   
                                                      
  
  
Overview:Formatting of this note might be different from the original.  
Unable to guess how much she smokes  
   
   
                                Tobacco use                     2020  
  
documented as of this encounter (statuses as of 10/18/2022)  
[facility]- History of Past illness Narrative*   
  
                                Problem         Noted Date      Resolved Date  
   
                                Asthmatic bronchitis 2018  
   
                                Anemia, unspecified 2008  
   
                                                      
  
  
Overview:Formatting of this note might be different from the original.  
Started on iron otc  (she's not sure of exact date)  
Unable to tolerate being off of it (severe fatigue reported)  
   
   
                                Continuous tobacco abuse 2008  
020  
   
                                                      
  
  
Overview:Formatting of this note might be different from the original.  
Unable to guess how much she smokes  
   
   
                                Tobacco use                     2020  
  
documented as of this encounter (statuses as of 10/19/2022)  
[facility]- History of Past illness Narrative*   
  
                                Problem         Noted Date      Resolved Date  
   
                                Asthmatic bronchitis 2018  
   
                                Anemia, unspecified 2008  
   
                                                      
  
  
Overview:Formatting of this note might be different from the original.  
Started on iron otc  (she's not sure of exact date)  
Unable to tolerate being off of it (severe fatigue reported)  
   
   
                                Continuous tobacco abuse 2008  
020  
   
                                                      
  
  
Overview:Formatting of this note might be different from the original.  
Unable to guess how much she smokes  
   
   
                                Tobacco use                     2020  
  
documented as of this encounter (statuses as of 2022)  
[facility]- History of Past illness Narrative*   
  
                                Problem         Noted Date      Resolved Date  
   
                                Asthmatic bronchitis 2018  
   
                                Anemia, unspecified 2008  
   
                                                      
  
  
Overview:Formatting of this note might be different from the original.  
Started on iron otc  (she's not sure of exact date)  
Unable to tolerate being off of it (severe fatigue reported)  
   
   
                                Continuous tobacco abuse 2008  
020  
   
                                                      
  
  
Overview:Formatting of this note might be different from the original.  
Unable to guess how much she smokes  
   
   
                                Tobacco use                     2020  
  
documented as of this encounter (statuses as of 2023)  
[facility]- History of Past illness Narrative*   
  
                                Problem         Noted Date      Resolved Date  
   
                                Asthmatic bronchitis 2018  
   
                                Anemia, unspecified 2008  
   
                                                      
  
  
Overview:Formatting of this note might be different from the original.  
Started on iron otc  (she's not sure of exact date)  
Unable to tolerate being off of it (severe fatigue reported)  
   
   
                                Continuous tobacco abuse 2008  
020  
   
                                                      
  
  
Overview:Formatting of this note might be different from the original.  
Unable to guess how much she smokes  
   
   
                                Tobacco use                     2020  
  
documented as of this encounter (statuses as of 2023)  
[facility]- History of Past illness Narrative*   
  
                                Problem         Noted Date      Resolved Date  
   
                                Asthmatic bronchitis 2018  
   
                                Anemia, unspecified 2008  
   
                                                      
  
  
Overview:Formatting of this note might be different from the original.  
Started on iron otc  (she's not sure of exact date)  
Unable to tolerate being off of it (severe fatigue reported)  
   
   
                                Continuous tobacco abuse 2008  
020  
   
                                                      
  
  
Overview:Formatting of this note might be different from the original.  
Unable to guess how much she smokes  
   
   
                                Tobacco use                     2020  
  
documented as of this encounter (statuses as of 2023)  
[facility]- History of Past illness Narrative*   
  
                                Problem         Noted Date      Resolved Date  
   
                                Asthmatic bronchitis 2018  
   
                                Anemia, unspecified 2008  
   
                                                      
  
  
Overview:Formatting of this note might be different from the original.  
Started on iron otc  (she's not sure of exact date)  
Unable to tolerate being off of it (severe fatigue reported)  
   
   
                                Continuous tobacco abuse 2008  
020  
   
                                                      
  
  
Overview:Formatting of this note might be different from the original.  
Unable to guess how much she smokes  
   
   
                                Tobacco use                     2020  
  
documented as of this encounter (statuses as of 2023)  
[facility]- History of Past illness Narrative*   
  
                                Problem         Noted Date      Resolved Date  
   
                                Asthmatic bronchitis 2018  
   
                                Anemia, unspecified 2008  
   
                                                      
  
  
Overview:Formatting of this note might be different from the original.  
Started on iron otc  (she's not sure of exact date)  
Unable to tolerate being off of it (severe fatigue reported)  
   
   
                                Continuous tobacco abuse 2008  
020  
   
                                                      
  
  
Overview:Formatting of this note might be different from the original.  
Unable to guess how much she smokes  
   
   
                                Tobacco use                     2020  
  
documented as of this encounter (statuses as of 2023)  
[place] [facility]- History of Past illness Narrative*   
  
                                Problem         Noted Date      Resolved Date  
   
                                Asthmatic bronchitis 2018  
   
                                Anemia, unspecified 2008  
   
                                                      
  
  
Overview:Formatting of this note might be different from the original.  
Started on iron otc  (she's not sure of exact date)  
Unable to tolerate being off of it (severe fatigue reported)  
   
   
                                Continuous tobacco abuse 2008  
020  
   
                                                      
  
  
Overview:Formatting of this note might be different from the original.  
Unable to guess how much she smokes  
   
   
                                Tobacco use                     2020  
  
documented as of this encounter (statuses as of 2023)  
[facility]- History of Past illness Narrative*   
  
                                Problem         Noted Date      Resolved Date  
   
                                Asthmatic bronchitis 2018  
   
                                Anemia, unspecified 2008  
   
                                                      
  
  
Overview:Formatting of this note might be different from the original.  
Started on iron otc  (she's not sure of exact date)  
Unable to tolerate being off of it (severe fatigue reported)  
   
   
                                Continuous tobacco abuse 2008  
020  
   
                                                      
  
  
Overview:Formatting of this note might be different from the original.  
Unable to guess how much she smokes  
   
   
                                Tobacco use                     2020  
  
documented as of this encounter (statuses as of 2023)  
[facility]- History of Past illness Narrative*   
  
                                Problem         Noted Date      Resolved Date  
   
                                Asthmatic bronchitis 2018  
   
                                Anemia, unspecified 2008  
   
                                                      
  
  
Overview:Formatting of this note might be different from the original.  
Started on iron otc  (she's not sure of exact date)  
Unable to tolerate being off of it (severe fatigue reported)  
   
   
                                Continuous tobacco abuse 2008  
020  
   
                                                      
  
  
Overview:Formatting of this note might be different from the original.  
Unable to guess how much she smokes  
   
   
                                Tobacco use                     2020  
  
documented as of this encounter (statuses as of 2023)  
[facility]- History of Past illness Narrative*   
  
                                Problem         Noted Date      Resolved Date  
   
                                Asthmatic bronchitis 2018  
   
                                Anemia, unspecified 2008  
   
                                                      
  
  
Overview:Formatting of this note might be different from the original.  
Started on iron otc  (she's not sure of exact date)  
Unable to tolerate being off of it (severe fatigue reported)  
   
   
                                Continuous tobacco abuse 2008  
020  
   
                                                      
  
  
Overview:Formatting of this note might be different from the original.  
Unable to guess how much she smokes  
   
   
                                Tobacco use                     2020  
  
documented as of this encounter (statuses as of 2023)  
[facility]- History of Past illness Narrative*   
  
                                Problem         Noted Date      Resolved Date  
   
                                Asthmatic bronchitis 2018  
   
                                Anemia, unspecified 2008  
   
                                                      
  
  
Overview:Formatting of this note might be different from the original.  
Started on iron otc  (she's not sure of exact date)  
Unable to tolerate being off of it (severe fatigue reported)  
   
   
                                Continuous tobacco abuse 2008  
020  
   
                                                      
  
  
Overview:Formatting of this note might be different from the original.  
Unable to guess how much she smokes  
   
   
                                Tobacco use                     2020  
  
documented as of this encounter (statuses as of 2023)  
[facility]- History of Past illness Narrative*   
  
                          Problem      Noted Date   Diagnosed Date Resolved Date  
   
                          Asthmatic bronchitis 2018  
020  
   
                          Anemia, unspecified 2008  
20  
   
                                                      
  
  
Overview:Formatting of this note might be different from the original.  
Started on iron otc  (she's not sure of exact date)  
Unable to tolerate being off of it (severe fatigue reported)  
   
   
                          Continuous tobacco abuse 2008  
   
                                                      
  
  
Overview:Formatting of this note might be different from the original.  
Unable to guess how much she smokes  
   
   
                          Tobacco use                            2020  
  
documented as of this encounter (statuses as of 2023)  
[facility]- History of Past illness Narrative*   
  
                          Problem      Noted Date   Diagnosed Date Resolved Date  
   
                          Asthmatic bronchitis 2018  
020  
   
                          Anemia, unspecified 2008  
20  
   
                                                      
  
  
Overview:Formatting of this note might be different from the original.  
Started on iron otc  (she's not sure of exact date)  
Unable to tolerate being off of it (severe fatigue reported)  
   
   
                          Continuous tobacco abuse 2008  
   
                                                      
  
  
Overview:Formatting of this note might be different from the original.  
Unable to guess how much she smokes  
   
   
                          Tobacco use                            2020  
  
documented as of this encounter (statuses as of 2024)  
[facility] note  
  
No data available for this section  
  
[facility] [place]  
Work Phone: [phone] note*   
  
                                                    Diagnosis  
   
                                                      
  
  
Uncontrolled type 2 diabetes mellitus with hyperglycemia (HCC)- Primary  
   
                                                      
  
  
Screening for hyperlipidemia  
  
  
Screening for lipoid disorders  
   
                                                      
  
  
Loose stools  
  
  
Abnormal feces  
  
documented in this encounter  
[facility] note*   
  
                                                    Diagnosis  
   
                                                      
  
  
Uncontrolled type 2 diabetes mellitus with hyperglycemia (HCC)  
  
documented in this encounter  
[facility] note*   
  
                                                    Diagnosis  
   
                                                      
  
  
Uncontrolled type 2 diabetes mellitus with hyperglycemia (HCC)- Primary  
  
documented in this encounter  
[facility] note*   
  
                                                    Diagnosis  
   
                                                      
  
  
Uncontrolled type 2 diabetes mellitus with hyperglycemia (HCC)- Primary  
   
                                                      
  
  
Urinary frequency  
   
                                                      
  
  
Migraine without aura and without status migrainosus, not intractable  
  
  
Migraine without aura, without mention of intractable migraine without mention 
of   
status migrainosus  
  
documented in this encounter  
[facility] note*   
  
                                                    Diagnosis  
   
                                                      
  
  
OPENED IN ERROR- Primary  
  
  
To allow closing an encounter opened in error (used in SmartSet)  
  
documented in this encounter  
[facility] note*   
  
                                                    Diagnosis  
   
                                                      
  
  
Urinary frequency- Primary  
   
                                                      
  
  
Acute vaginitis  
  
  
Vaginitis and vulvovaginitis, unspecified  
  
documented in this encounter  
[facility]   
  
                                                    Diagnosis  
   
                                                      
  
  
Nocturnal enuresis  
  
documented in this encounter  
[facility] note*   
  
                                                    Diagnosis  
   
                                                      
  
  
Nocturnal enuresis- Primary  
  
documented in this encounter  
[facility] note*   
  
                                                    Diagnosis  
   
                                                      
  
  
Fall, initial encounter- Primary  
  
documented in this encounter  
[facility] note*   
  
                                                    Diagnosis  
   
                                                      
  
  
Closed fracture of one rib of right side with routine healing, subsequent 
encounter-   
Primary  
   
                                                      
  
  
Uncontrolled type 2 diabetes mellitus with hyperglycemia (HCC)  
   
                                                      
  
  
Psychogenic nonepileptic seizure  
   
                                                      
  
  
Bipolar affective disorder, remission status unspecified ([facility])  
  
documented in this encounter  
[facility] note*   
  
                                                    Diagnosis  
   
                                                      
  
  
Urgency of urination- Primary  
  
documented in this encounter  
[facility]   
  
                                                    Diagnosis  
   
                                                      
  
  
Acute cystitis without hematuria- Primary  
  
  
Acute cystitis  
   
                                                      
  
  
Candida glabrata infection  
  
  
Candidiasis of unspecified site  
   
                                                      
  
  
Poorly controlled diabetes mellitus (HCC)  
  
  
Type II or unspecified type diabetes mellitus without mention of complication, 
not   
stated as uncontrolled  
   
                                                      
  
  
Allergy to sulfa drugs  
  
  
Other drug allergy  
  
documented in this encounter  
[facility] note*   
  
                                                    Diagnosis  
   
                                                      
  
  
OAB (overactive bladder)- Primary  
  
  
Hypertonicity of bladder  
   
                                                      
  
  
Enuresis  
  
  
Unspecified urinary incontinence  
  
documented in this encounter  
[facility]   
  
                                                    Diagnosis  
   
                                                      
  
  
Vaginal itching- Primary  
  
  
Pruritus of genital organs  
   
                                                      
  
  
Chronic right shoulder pain  
  
  
Pain in joint, shoulder region  
  
documented in this encounter  
[facility] note*   
  
                                                    Diagnosis  
   
                                                      
  
  
Nocturnal enuresis- Primary  
   
                                                      
  
  
Urinary urgency  
  
  
Urgency of urination  
  
documented in this encounter  
[facility] note*   
  
                                                    Diagnosis  
   
                                                      
  
  
Headache, unspecified headache type- Primary  
  
documented in this encounter  
[facility] note*   
  
                                                    Diagnosis  
   
                                                      
  
  
OAB (overactive bladder)  
  
  
Hypertonicity of bladder  
  
documented in this encounter  
[facility] Outpatient Procedure (Routine) 
  - Pending Review  
  
                          Specialty    Diagnoses / Procedures Referred By Contac  
t Referred To Contact  
   
                                        [facility]    
  
  
Diagnoses  
  
  
Nocturnal enuresis  
  
  
Urinary urgency  
  
  
  
Procedures  
  
  
URODYNAMICS WITH EMG  
  
  
EMG STDS ANAL/URTL   
SPHNCTR OT/THN NDL                       
  
  
Sara Astorga APRN.CNP  
  
  
[address] [place] [address]  
  
  
Phone: [phone]  
  
  
Fax: [phone]                         
  
  
[address] [address]  
  
  
  
                          Referral ID  Status       Reason       Start   
Date                                    Expiration   
Date                                    Visits   
Requested                               Visits   
Authorized  
   
                                        [id number]            Pending   
Review                                    
  
  
Auto-Generat  
ed Referral     3/21/2023       3/21/2024       1               1  
  
  
  
  
Electronically signed by Sara JOHNSCNP at 2023 10:14 AM EDT  
  
  
[facility]  
  
Summary Purpose  
  
  
                                                      
  
  
  
Family History  
No Family History Records FoundNo Family History Records FoundNo Family History 
Records FoundNo Family History Records Found  
  
Advance Directives  
No Advanced Directives Records FoundNo Advanced Directives Records FoundNo 
Advanced Directives Records FoundNo Advanced Directives Records Found  
  
Medications Administered Section  
     Inactive Administered Medications - up to 3 most recent administrations  
  
                    Medication Order MAR Action Action Date Dose      Rate        
Site  
   
                                                      
  
  
keTORolac 60 mg injection   
(TORADOL)  
  
  
60 mg, INTRAMUSCULAR, ONCE,   
1 dose, On 22 at   
1200, Ketorolac (Toradol)   
is indicated for the   
short-term (up to 5 days)   
management of moderately   
severe acute pain.   
Continuation of ketorolac   
(Toradol) beyond 5 days   
increases the risk of   
developing serious adverse   
events. Please verify the   
duration of therapy for   
ketorolac (Toradol)., If   
ordered PRN for pain,   
patient/guardian may elect   
to receive this medication   
for higher pain levels   
INSTEAD of the opioid, if   
preferred: Yes            Given                     2022 12:28 PM   
EDT                 60 mg                                   Buttocks, Left  
   
                                                               
  
  
  
Reason for Referral  
  
  
                          Specialty    Diagnoses / Procedures Referred By Contac  
t Referred To Contact  
   
                                        Urology               
  
  
Diagnoses  
  
  
Urinary frequency  
  
  
  
Procedures  
  
  
CONSULT TO UROLOGY  
  
  
OFFICE/OUTPATIENT NEW HIGH   
MDM 60-74 MINUTES                         
  
  
Marce Luevano APRN.CNP  
  
  
[address] [place] [address]  
  
  
Phone: [phone]  
  
  
Fax: [phone]                         
  
  
  
  
  
                          Referral ID  Status       Reason       Start   
Date                                    Expiration   
Date                                    Visits   
Requested                               Visits   
Authorized  
   
                                        [id number]            Pending   
Review                                    
  
  
PCP Requested   
Referral        2022       1               1  
  
  
  
Additional Source Comments  
  
  
  
                                                    INFORMATION SOURCE (unrecogn  
ized section and content)  
   
                                          
  
  
  
                                          
   
                                          
  
  
  
                                DATE CREATED    AUTHOR          AUTHOR'S ORGANIZ  
ATION  
   
                                2022                      [facility]  
  
  
  
                                DATE CREATED    AUTHOR          AUTHOR'S ORGANIZ  
ATION  
   
                                2022                      [facility]  
oundation ([place])  
  
  
  
                                DATE CREATED    AUTHOR          AUTHOR'S ORGANIZ  
ATION  
   
                                2024                      [facility]  
  
  
  
  
  
                                                    Source Comments (unrecognize  
d section and content)  
   
                                                    In the event this informatio  
n is protected by the Federal Confidentiality of   
Alcohol   
and Drug Abuse Patient Records regulations: This information has been disclosed 
to   
you from records protected by Federal confidentiality rules (42 CFR Part 2). The
   
Federal rules prohibit you from making any further disclosure of this 
information   
unless further disclosure is expressly permitted by the written consent of the 
person   
to whom it pertains or as otherwise permitted by 42 CFR Part 2. A general   
authorization for the release of medical or other information is NOT sufficient 
for   
this purpose. The Federal rules restrict any use of the information to 
criminally   
investigate or prosecute any alcohol or drug abuse patient.[facility] 
the   
event this information is protected by the Federal Confidentiality of Alcohol 
and   
Drug Abuse Patient Records regulations: This information has been disclosed to 
you   
from records protected by Federal confidentiality rules (42 CFR Part 2). The 
Federal   
rules prohibit you from making any further disclosure of this information unless
   
further disclosure is expressly permitted by the written consent of the person 
to   
whom it pertains or as otherwise permitted by 42 CFR Part 2. A general 
authorization   
for the release of medical or other information is NOT sufficient for this 
purpose.   
The Federal rules restrict any use of the information to criminally investigate 
or   
prosecute any alcohol or drug abuse patient.[facility] the event this   
information is protected by the Federal Confidentiality of Alcohol and Drug 
Abuse   
Patient Records regulations: This information has been disclosed to you from 
records   
protected by Federal confidentiality rules (42 CFR Part 2). The Federal rules   
prohibit you from making any further disclosure of this information unless 
further   
disclosure is expressly permitted by the written consent of the person to whom 
it   
pertains or as otherwise permitted by 42 CFR Part 2. A general authorization for
 the   
release of medical or other information is NOT sufficient for this purpose. The   
Federal rules restrict any use of the information to criminally investigate or   
prosecute any alcohol or drug abuse patient.[facility] the event this   
information is protected by the Federal Confidentiality of Alcohol and Drug 
Abuse   
Patient Records regulations: This information has been disclosed to you from 
records   
protected by Federal confidentiality rules (42 CFR Part 2). The Federal rules   
prohibit you from making any further disclosure of this information unless 
further   
disclosure is expressly permitted by the written consent of the person to whom 
it   
pertains or as otherwise permitted by 42 CFR Part 2. A general authorization for
 the   
release of medical or other information is NOT sufficient for this purpose. The   
Federal rules restrict any use of the information to criminally investigate or   
prosecute any alcohol or drug abuse patient.[facility] the event this   
information is protected by the Federal Confidentiality of Alcohol and Drug 
Abuse   
Patient Records regulations: This information has been disclosed to you from 
records   
protected by Federal confidentiality rules (42 CFR Part 2). The Federal rules   
prohibit you from making any further disclosure of this information unless 
further   
disclosure is expressly permitted by the written consent of the person to whom 
it   
pertains or as otherwise permitted by 42 CFR Part 2. A general authorization for
 the   
release of medical or other information is NOT sufficient for this purpose. The   
Federal rules restrict any use of the information to criminally investigate or   
prosecute any alcohol or drug abuse patient.[facility] the event this   
information is protected by the Federal Confidentiality of Alcohol and Drug 
Abuse   
Patient Records regulations: This information has been disclosed to you from 
records   
protected by Federal confidentiality rules (42 CFR Part 2). The Federal rules   
prohibit you from making any further disclosure of this information unless 
further   
disclosure is expressly permitted by the written consent of the person to whom 
it   
pertains or as otherwise permitted by 42 CFR Part 2. A general authorization for
 the   
release of medical or other information is NOT sufficient for this purpose. The   
Federal rules restrict any use of the information to criminally investigate or   
prosecute any alcohol or drug abuse patient.[facility] Hospital[facility] the event this   
information is protected by the Federal Confidentiality of Alcohol and Drug 
Abuse   
Patient Records regulations: This information has been disclosed to you from 
records   
protected by Federal confidentiality rules (42 CFR Part 2). The Federal rules   
prohibit you from making any further disclosure of this information unless 
further   
disclosure is expressly permitted by the written consent of the person to whom 
it   
pertains or as otherwise permitted by 42 CFR Part 2. A general authorization for
 the   
release of medical or other information is NOT sufficient for this purpose. The   
Federal rules restrict any use of the information to criminally investigate or   
prosecute any alcohol or drug abuse patient.[facility] the event this   
information is protected by the Federal Confidentiality of Alcohol and Drug 
Abuse   
Patient Records regulations: This information has been disclosed to you from 
records   
protected by Federal confidentiality rules (42 CFR Part 2). The Federal rules   
prohibit you from making any further disclosure of this information unless 
further   
disclosure is expressly permitted by the written consent of the person to whom 
it   
pertains or as otherwise permitted by 42 CFR Part 2. A general authorization for
 the   
release of medical or other information is NOT sufficient for this purpose. The   
Federal rules restrict any use of the information to criminally investigate or   
prosecute any alcohol or drug abuse patient.[facility] the event this   
information is protected by the Federal Confidentiality of Alcohol and Drug 
Abuse   
Patient Records regulations: This information has been disclosed to you from 
records   
protected by Federal confidentiality rules (42 CFR Part 2). The Federal rules   
prohibit you from making any further disclosure of this information unless 
further   
disclosure is expressly permitted by the written consent of the person to whom 
it   
pertains or as otherwise permitted by 42 CFR Part 2. A general authorization for
 the   
release of medical or other information is NOT sufficient for this purpose. The   
Federal rules restrict any use of the information to criminally investigate or   
prosecute any alcohol or drug abuse patient.[facility] the event this   
information is protected by the Federal Confidentiality of Alcohol and Drug 
Abuse   
Patient Records regulations: This information has been disclosed to you from 
records   
protected by Federal confidentiality rules (42 CFR Part 2). The Federal rules   
prohibit you from making any further disclosure of this information unless 
further   
disclosure is expressly permitted by the written consent of the person to whom 
it   
pertains or as otherwise permitted by 42 CFR Part 2. A general authorization for
 the   
release of medical or other information is NOT sufficient for this purpose. The   
Federal rules restrict any use of the information to criminally investigate or   
prosecute any alcohol or drug abuse patient.[facility] the event this   
information is protected by the Federal Confidentiality of Alcohol and Drug 
Abuse   
Patient Records regulations: This information has been disclosed to you from 
records   
protected by Federal confidentiality rules (42 CFR Part 2). The Federal rules   
prohibit you from making any further disclosure of this information unless 
further   
disclosure is expressly permitted by the written consent of the person to whom 
it   
pertains or as otherwise permitted by 42 CFR Part 2. A general authorization for
 the   
release of medical or other information is NOT sufficient for this purpose. The   
Federal rules restrict any use of the information to criminally investigate or   
prosecute any alcohol or drug abuse patient.[facility] HospitalIn the event this   
information is protected by the Federal Confidentiality of Alcohol and Drug 
Abuse   
Patient Records regulations: This information has been disclosed to you from 
records   
protected by Federal confidentiality rules (42 CFR Part 2). The Federal rules   
prohibit you from making any further disclosure of this information unless 
further   
disclosure is expressly permitted by the written consent of the person to whom 
it   
pertains or as otherwise permitted by 42 CFR Part 2. A general authorization for
 the   
release of medical or other information is NOT sufficient for this purpose. The   
Federal rules restrict any use of the information to criminally investigate or   
prosecute any alcohol or drug abuse patient.[facility] HospitalIn the event this   
information is protected by the Federal Confidentiality of Alcohol and Drug 
Abuse   
Patient Records regulations: This information has been disclosed to you from 
records   
protected by Federal confidentiality rules (42 CFR Part 2). The Federal rules   
prohibit you from making any further disclosure of this information unless 
further   
disclosure is expressly permitted by the written consent of the person to whom 
it   
pertains or as otherwise permitted by 42 CFR Part 2. A general authorization for
 the   
release of medical or other information is NOT sufficient for this purpose. The   
Federal rules restrict any use of the information to criminally investigate or   
prosecute any alcohol or drug abuse patient.[facility] HospitalIn the event this   
information is protected by the Federal Confidentiality of Alcohol and Drug 
Abuse   
Patient Records regulations: This information has been disclosed to you from 
records   
protected by Federal confidentiality rules (42 CFR Part 2). The Federal rules   
prohibit you from making any further disclosure of this information unless 
further   
disclosure is expressly permitted by the written consent of the person to whom 
it   
pertains or as otherwise permitted by 42 CFR Part 2. A general authorization for
 the   
release of medical or other information is NOT sufficient for this purpose. The   
Federal rules restrict any use of the information to criminally investigate or   
prosecute any alcohol or drug abuse patient.[facility] HospitalIn the event this   
information is protected by the Federal Confidentiality of Alcohol and Drug 
Abuse   
Patient Records regulations: This information has been disclosed to you from 
records   
protected by Federal confidentiality rules (42 CFR Part 2). The Federal rules   
prohibit you from making any further disclosure of this information unless 
further   
disclosure is expressly permitted by the written consent of the person to whom 
it   
pertains or as otherwise permitted by 42 CFR Part 2. A general authorization for
 the   
release of medical or other information is NOT sufficient for this purpose. The   
Federal rules restrict any use of the information to criminally investigate or   
prosecute any alcohol or drug abuse patient.[facility] HospitalIn the event this   
information is protected by the Federal Confidentiality of Alcohol and Drug 
Abuse   
Patient Records regulations: This information has been disclosed to you from 
records   
protected by Federal confidentiality rules (42 CFR Part 2). The Federal rules   
prohibit you from making any further disclosure of this information unless 
further   
disclosure is expressly permitted by the written consent of the person to whom 
it   
pertains or as otherwise permitted by 42 CFR Part 2. A general authorization for
 the   
release of medical or other information is NOT sufficient for this purpose. The   
Federal rules restrict any use of the information to criminally investigate or   
prosecute any alcohol or drug abuse patient.[facility] HospitalIn the event this   
information is protected by the Federal Confidentiality of Alcohol and Drug 
Abuse   
Patient Records regulations: This information has been disclosed to you from 
records   
protected by Federal confidentiality rules (42 CFR Part 2). The Federal rules   
prohibit you from making any further disclosure of this information unless 
further   
disclosure is expressly permitted by the written consent of the person to whom 
it   
pertains or as otherwise permitted by 42 CFR Part 2. A general authorization for
 the   
release of medical or other information is NOT sufficient for this purpose. The   
Federal rules restrict any use of the information to criminally investigate or   
prosecute any alcohol or drug abuse patient.[facility] HospitalIn the event this   
information is protected by the Federal Confidentiality of Alcohol and Drug 
Abuse   
Patient Records regulations: This information has been disclosed to you from 
records   
protected by Federal confidentiality rules (42 CFR Part 2). The Federal rules   
prohibit you from making any further disclosure of this information unless 
further   
disclosure is expressly permitted by the written consent of the person to whom 
it   
pertains or as otherwise permitted by 42 CFR Part 2. A general authorization for
 the   
release of medical or other information is NOT sufficient for this purpose. The   
Federal rules restrict any use of the information to criminally investigate or   
prosecute any alcohol or drug abuse patient.[facility] HospitalIn the event this   
information is protected by the Federal Confidentiality of Alcohol and Drug 
Abuse   
Patient Records regulations: This information has been disclosed to you from 
records   
protected by Federal confidentiality rules (42 CFR Part 2). The Federal rules   
prohibit you from making any further disclosure of this information unless 
further   
disclosure is expressly permitted by the written consent of the person to whom 
it   
pertains or as otherwise permitted by 42 CFR Part 2. A general authorization for
 the   
release of medical or other information is NOT sufficient for this purpose. The   
Federal rules restrict any use of the information to criminally investigate or   
prosecute any alcohol or drug abuse patient.[facility] HospitalIn the event this   
information is protected by the Federal Confidentiality of Alcohol and Drug 
Abuse   
Patient Records regulations: This information has been disclosed to you from 
records   
protected by Federal confidentiality rules (42 CFR Part 2). The Federal rules   
prohibit you from making any further disclosure of this information unless 
further   
disclosure is expressly permitted by the written consent of the person to whom 
it   
pertains or as otherwise permitted by 42 CFR Part 2. A general authorization for
 the   
release of medical or other information is NOT sufficient for this purpose. The   
Federal rules restrict any use of the information to criminally investigate or   
prosecute any alcohol or drug abuse patient.[facility] HospitalIn the event this   
information is protected by the Federal Confidentiality of Alcohol and Drug 
Abuse   
Patient Records regulations: This information has been disclosed to you from 
records   
protected by Federal confidentiality rules (42 CFR Part 2). The Federal rules   
prohibit you from making any further disclosure of this information unless 
further   
disclosure is expressly permitted by the written consent of the person to whom 
it   
pertains or as otherwise permitted by 42 CFR Part 2. A general authorization for
 the   
release of medical or other information is NOT sufficient for this purpose. The   
Federal rules restrict any use of the information to criminally investigate or   
prosecute any alcohol or drug abuse patient.[facility] HospitalIn the event this   
information is protected by the Federal Confidentiality of Alcohol and Drug 
Abuse   
Patient Records regulations: This information has been disclosed to you from 
records   
protected by Federal confidentiality rules (42 CFR Part 2). The Federal rules   
prohibit you from making any further disclosure of this information unless 
further   
disclosure is expressly permitted by the written consent of the person to whom 
it   
pertains or as otherwise permitted by 42 CFR Part 2. A general authorization for
 the   
release of medical or other information is NOT sufficient for this purpose. The   
Federal rules restrict any use of the information to criminally investigate or   
prosecute any alcohol or drug abuse patient.[facility] HospitalIn the event this   
information is protected by the Federal Confidentiality of Alcohol and Drug 
Abuse   
Patient Records regulations: This information has been disclosed to you from 
records   
protected by Federal confidentiality rules (42 CFR Part 2). The Federal rules   
prohibit you from making any further disclosure of this information unless 
further   
disclosure is expressly permitted by the written consent of the person to whom 
it   
pertains or as otherwise permitted by 42 CFR Part 2. A general authorization for
 the   
release of medical or other information is NOT sufficient for this purpose. The   
Federal rules restrict any use of the information to criminally investigate or   
prosecute any alcohol or drug abuse patient.[facility] HospitalIn the event this   
information is protected by the Federal Confidentiality of Alcohol and Drug 
Abuse   
Patient Records regulations: This information has been disclosed to you from 
records   
protected by Federal confidentiality rules (42 CFR Part 2). The Federal rules   
prohibit you from making any further disclosure of this information unless 
further   
disclosure is expressly permitted by the written consent of the person to whom 
it   
pertains or as otherwise permitted by 42 CFR Part 2. A general authorization for
 the   
release of medical or other information is NOT sufficient for this purpose. The   
Federal rules restrict any use of the information to criminally investigate or   
prosecute any alcohol or drug abuse patient.[facility] HospitalIn the event this   
information is protected by the Federal Confidentiality of Alcohol and Drug 
Abuse   
Patient Records regulations: This information has been disclosed to you from 
records   
protected by Federal confidentiality rules (42 CFR Part 2). The Federal rules   
prohibit you from making any further disclosure of this information unless 
further   
disclosure is expressly permitted by the written consent of the person to whom 
it   
pertains or as otherwise permitted by 42 CFR Part 2. A general authorization for
 the   
release of medical or other information is NOT sufficient for this purpose. The   
Federal rules restrict any use of the information to criminally investigate or   
prosecute any alcohol or drug abuse patient.[facility] HospitalIn the event this   
information is protected by the Federal Confidentiality of Alcohol and Drug 
Abuse   
Patient Records regulations: This information has been disclosed to you from 
records   
protected by Federal confidentiality rules (42 CFR Part 2). The Federal rules   
prohibit you from making any further disclosure of this information unless 
further   
disclosure is expressly permitted by the written consent of the person to whom 
it   
pertains or as otherwise permitted by 42 CFR Part 2. A general authorization for
 the   
release of medical or other information is NOT sufficient for this purpose. The   
Federal rules restrict any use of the information to criminally investigate or   
prosecute any alcohol or drug abuse patient.[facility] HospitalIn the event this   
information is protected by the Federal Confidentiality of Alcohol and Drug 
Abuse   
Patient Records regulations: This information has been disclosed to you from 
records   
protected by Federal confidentiality rules (42 CFR Part 2). The Federal rules   
prohibit you from making any further disclosure of this information unless 
further   
disclosure is expressly permitted by the written consent of the person to whom 
it   
pertains or as otherwise permitted by 42 CFR Part 2. A general authorization for
 the   
release of medical or other information is NOT sufficient for this purpose. The   
Federal rules restrict any use of the information to criminally investigate or   
prosecute any alcohol or drug abuse patient.[facility] HospitalIn the event this   
information is protected by the Federal Confidentiality of Alcohol and Drug 
Abuse   
Patient Records regulations: This information has been disclosed to you from 
records   
protected by Federal confidentiality rules (42 CFR Part 2). The Federal rules   
prohibit you from making any further disclosure of this information unless 
further   
disclosure is expressly permitted by the written consent of the person to whom 
it   
pertains or as otherwise permitted by 42 CFR Part 2. A general authorization for
 the   
release of medical or other information is NOT sufficient for this purpose. The   
Federal rules restrict any use of the information to criminally investigate or   
prosecute any alcohol or drug abuse patient.[facility] HospitalIn the event this   
information is protected by the Federal Confidentiality of Alcohol and Drug 
Abuse   
Patient Records regulations: This information has been disclosed to you from 
records   
protected by Federal confidentiality rules (42 CFR Part 2). The Federal rules   
prohibit you from making any further disclosure of this information unless 
further   
disclosure is expressly permitted by the written consent of the person to whom 
it   
pertains or as otherwise permitted by 42 CFR Part 2. A general authorization for
 the   
release of medical or other information is NOT sufficient for this purpose. The   
Federal rules restrict any use of the information to criminally investigate or   
prosecute any alcohol or drug abuse patient.[facility]  
  
  
  
  
                                                    Reason for Visit (unrecogniz  
ed section and content)  
   
                                          
  
  
  
                                          
   
                                          
  
  
  
                                Reason          Onset Date      Comments  
   
                                Refill Request  2022        
   
                                Refill Request  2022        
  
  
  
                                        Reason              Comments  
   
                                        Results               
  
  
  
                                        Reason              Comments  
   
                                        Acute Visit           
  
  
  
                                        Reason              Comments  
   
                                        Missed Appointment    
  
  
  
                                        Reason              Comments  
   
                                        Diabetes              
  
  
  
                                        Reason              Comments  
   
                                        Appointment         needs to be seen in   
office. can't address UTI issues via VV.  
  
  
  
                                        Reason              Comments  
   
                                        UTI                 frequency x 2 weeks  
  
  
  
                                        Reason              Comments  
   
                                        Results             + yeast  
  
  
  
                                        Reason              Comments  
   
                                        Results             Urine Cx = E coli  
  
  
  
                                Reason          Onset Date      Comments  
   
                                Refill Request  10/17/2022        
  
  
  
                                        Reason              Comments  
   
                                        Medication Problem    
  
  
  
                                        Reason              Comments  
   
                                        Follow Up             
  
  
  
                                        Reason              Comments  
   
                                        ED Follow-up        [facility] 22 from a   
fall  
  
  
  
                                        Reason              Comments  
   
                                        UTI                 Urgency, frequncy  
  
  
  
                                        Reason              Comments  
   
                                        Appointment           
  
  
  
                                        Reason              Comments  
   
                                        Patient Question      
  
  
  
                                        Reason              Comments  
   
                                        Vaginal Problem     itching x 1 day, rig  
ht shoulder pain x 1 year  
  
  
  
                                        Reason              Comments  
   
                                        Headache            Elevated glucose zuly english  
  
  
  
  
  
                                                    Care Teams (unrecognized sec  
tion and content)  
   
                                          
  
  
  
                                          
   
                                          
  
  
  
                      Team Member Relationship Specialty  Start Date End Date  
   
                                                      
  
  
Dru Lam MD  
  
  
[address] [facility] [place] [address]  
  
  
[phone] (Work)  
  
  
[phone] (Fax) PCP - General   Family Practice 10/15/18          
  
  
  
                      Team Member Relationship Specialty  Start Date End Date  
   
                                                      
  
  
Dru Lam MD  
  
  
[address] [facility] [place] [address]  
  
  
[phone] (Work)  
  
  
[phone] (Fax) PCP - General   Family Practice 10/15/18          
  
  
  
                      Team Member Relationship Specialty  Start Date End Date  
   
                                                      
  
  
Dru Lam MD  
  
  
[address] [facility] [place] [address]  
  
  
[phone] (Work)  
  
  
[phone] (Fax) PCP - General   Family Practice 10/15/18          
  
  
  
                      Team Member Relationship Specialty  Start Date End Date  
   
                                                      
  
  
Dru Lam MD  
  
  
[address] [facility] [place] [address]  
  
  
[phone] (Work)  
  
  
[phone] (Fax) PCP - General   Family Practice 10/15/18          
  
  
  
                      Team Member Relationship Specialty  Start Date End Date  
   
                                                      
  
  
Dru Lam MD  
  
  
[address] [facility] [place] [address]  
  
  
[phone] (Work)  
  
  
[phone] (Fax) PCP - General   Family Practice 10/15/18          
  
  
  
                      Team Member Relationship Specialty  Start Date End Date  
   
                                                      
  
  
Dru Lam MD  
  
  
[address] [facility] [place] [address]  
  
  
[phone] (Work)  
  
  
[phone] (Fax) PCP - General   Family Practice 10/15/18          
  
  
  
                      Team Member Relationship Specialty  Start Date End Date  
   
                                                      
  
  
Dru Lam MD  
  
  
[address] [facility] [place] [address]  
  
  
[phone] (Work)  
  
  
[phone] (Fax) PCP - General   Family Practice 10/15/18          
  
  
  
                      Team Member Relationship Specialty  Start Date End Date  
   
                                                      
  
  
Dru Lam MD  
  
  
[address] [facility] [place] [address]  
  
  
[phone] (Work)  
  
  
[phone] (Fax) PCP - General   Family Practice 10/15/18          
  
  
  
                      Team Member Relationship Specialty  Start Date End Date  
   
                                                      
  
  
Dru Lam MD  
  
  
[address] [facility] [place] [address]  
  
  
[phone] (Work)  
  
  
[phone] (Fax) PCP - General   Family Medicine 10/15/18          
  
  
  
                      Team Member Relationship Specialty  Start Date End Date  
   
                                                      
  
  
Dru Lam MD  
  
  
[address] [facility] [place] [address]  
  
  
[phone] (Work)  
  
  
[phone] (Fax) PCP - General   Family Medicine 10/15/18          
  
  
  
                      Team Member Relationship Specialty  Start Date End Date  
   
                                                      
  
  
Dru Lam MD  
  
  
[address] [facility] [place] [address]  
  
  
[phone] (Work)  
  
  
[phone] (Fax) PCP - General   Family Medicine 10/15/18          
  
  
  
                      Team Member Relationship Specialty  Start Date End Date  
   
                                                      
  
  
Dru Lam MD  
  
  
[address] [facility] [place] [address]  
  
  
[phone] (Work)  
  
  
[phone] (Fax) PCP - General   Family Medicine 10/15/18          
  
  
  
                      Team Member Relationship Specialty  Start Date End Date  
   
                                                      
  
  
Dru Lam MD  
  
  
[address] [facility] [place] [address]  
  
  
[phone] (Work)  
  
  
[phone] (Fax) PCP - General   Family Medicine 10/15/18          
  
  
  
                      Team Member Relationship Specialty  Start Date End Date  
   
                                                      
  
  
Dru Lam MD  
  
  
[address] [facility] [place] [address]  
  
  
[phone] (Work)  
  
  
[phone] (Fax) PCP - General   Family Medicine 10/15/18          
  
  
  
                      Team Member Relationship Specialty  Start Date End Date  
   
                                                      
  
  
Dru Lam MD  
  
  
[address] [place] [address]  
  
  
[phone] (Work)  
  
  
[phone] (Fax) PCP - General   Family Medicine 10/15/18          
  
  
  
                      Team Member Relationship Specialty  Start Date End Date  
   
                                                      
  
  
Dru Lam MD  
  
  
[address] [place] [address]  
  
  
[phone] (Work)  
  
  
[phone] (Fax) PCP - General   Family Medicine 10/15/18          
  
  
  
                      Team Member Relationship Specialty  Start Date End Date  
   
                                                      
  
  
Dru Lam MD  
  
  
[address] [place] [address]  
  
  
[phone] (Work)  
  
  
[phone] (Fax) PCP - General   Family Medicine 10/15/18          
  
  
  
                      Team Member Relationship Specialty  Start Date End Date  
   
                                                      
  
  
Dru Lam MD  
  
  
[address] [place] [address]  
  
  
[phone] (Work)  
  
  
[phone] (Fax) PCP - General   Family Medicine 10/15/18          
  
  
  
                      Team Member Relationship Specialty  Start Date End Date  
   
                                                      
  
  
Dru Lam MD  
  
  
[address] [place] [address]  
  
  
[phone] (Work)  
  
  
[phone] (Fax) PCP - General   Family Medicine 10/15/18          
  
  
  
                      Team Member Relationship Specialty  Start Date End Date  
   
                                                      
  
  
Dru Lam MD  
  
  
[address] [place] [address]  
  
  
[phone] (Work)  
  
  
[phone] (Fax) PCP - General   Family Medicine 10/15/18          
  
  
  
                      Team Member Relationship Specialty  Start Date End Date  
   
                                                      
  
  
Dru Lam MD  
  
  
NPI: [id number]  
  
  
[address] [place] [address]  
  
  
[phone] (Work)  
  
  
[phone] (Fax) PCP - General   Family Medicine 10/15/18          
  
  
  
                      Team Member Relationship Specialty  Start Date End Date  
   
                                                      
  
  
Dru Lam MD  
  
  
NPI: [id number]  
  
  
[address] [place] [address]  
  
  
[phone] (Work)  
  
  
[phone] (Fax) PCP - General   Family Medicine 10/15/18          
  
  
  
  
  
                                                    Care Team (unrecognized sect  
ion and content)  
   
                                                      
  
  
Care Team Personnel  
  
  
Name: DRU LAM MD  
Member Role: Primary Care Physician  
Address:  
Address: [address] [place] [address]  
  
  
  
Name: JOSÉ LUIS Brandon  
Position: AO RN  
Member Role: ED RN  
  
  
  
Name: ARISTIDES JUNG MD  
Position:  ED Physician  
Member Role: Attending Physician  
Address:  
Address: [facility]  
[address] [place], [place] [address]  
  
  
FOR RECORDS PERTAINING TO PATIENTS WHO ARE OR HAVE BEEN ENROLLED IN A CHEMICAL 
DEPENDENCY/SUBSTANCEABUSE PROGRAM, SOME INFORMATION MAY BE OMITTED. This 
clinical summary was aggregated from multiple sources.  Caution should be 
exercised in using it in the provision of clinical care. This summary normalizes
 information from multiple sources, and as a consequence, information in this 
document may materially change the coding, format and clinical context of 
patient data. In addition, data may be omitted in some cases. CLINICAL DECISIONS
 SHOULD BE BASED ON THE PRIMARY CLINICAL RECORDS. [facility] [employer] [facility]. provides
 no warranty or guarantee of the accuracy or completeness of information in this
document.

## 2024-03-09 ENCOUNTER — HOSPITAL ENCOUNTER (EMERGENCY)
Age: 34
Discharge: HOME | End: 2024-03-09
Payer: MEDICAID

## 2024-03-09 VITALS
DIASTOLIC BLOOD PRESSURE: 94 MMHG | RESPIRATION RATE: 18 BRPM | HEART RATE: 89 BPM | OXYGEN SATURATION: 97 % | SYSTOLIC BLOOD PRESSURE: 151 MMHG | TEMPERATURE: 97.4 F

## 2024-03-09 VITALS
DIASTOLIC BLOOD PRESSURE: 94 MMHG | RESPIRATION RATE: 20 BRPM | BODY MASS INDEX: 32.7 KG/M2 | OXYGEN SATURATION: 97 % | SYSTOLIC BLOOD PRESSURE: 151 MMHG | HEART RATE: 101 BPM | TEMPERATURE: 97.4 F

## 2024-03-09 DIAGNOSIS — F17.210: ICD-10-CM

## 2024-03-09 DIAGNOSIS — J45.901: Primary | ICD-10-CM

## 2024-03-09 DIAGNOSIS — E11.9: ICD-10-CM

## 2024-03-09 DIAGNOSIS — Z79.84: ICD-10-CM

## 2024-03-09 DIAGNOSIS — Z79.899: ICD-10-CM

## 2024-03-09 PROCEDURE — 99283 EMERGENCY DEPT VISIT LOW MDM: CPT

## 2024-03-09 PROCEDURE — 71045 X-RAY EXAM CHEST 1 VIEW: CPT

## 2024-03-09 NOTE — EDS_ITS
HPI    
History of Present Illness    
Chief Complaint: Cough    
Informant: patient    
Onset/Context/Timing    
Onset: Weeks    
Narrative    
Narrative:     
Patient presents with a 1 week history of cough and congestion.  She was seen at  
urgent care a few nights ago and tested negative for flu, COVID, and RSV.  She   
was told it was just a cold and it would pass.  Patient complains of continued   
cough that worsened and is now bringing up green sputum.  She does have a   
history of asthma and has had some increased wheezing as well.  She does not   
have an inhaler.    
    
[facility]    
Medical History (Reviewed 03/09/24 @ 20:04 by Dr. Janett Cummins MD)    
    
Anxiety    
Asthma    
Bipolar disorder    
Bone fracture    
Chronic pain    
Depression    
Diabetes    
Generalized headaches    
H/O emotional problems    
History of back problems    
History of recurrent UTIs    
Seizures    
Smoker    
Substance abuse    
Tobacco abuse    
Type 2 diabetes mellitus    
Urinary incontinence, nocturnal enuresis    
Vision problems    
    
    
                                Home Medications    
    
desmopressin 0.2 mg tablet tablet PO 04/07/23 [History Last Taken Unknown]    
trospium 20 mg tablet 20 mg PO 05/18/23 [History Last Taken Unknown]    
glimepiride 2 mg tablet See Rx Instructions .Route .COMPLEX #60 tabs 12/05/23   
[Rx Last Taken Unknown]    
metformin 500 mg tablet See Rx Instructions .Route .COMPLEX #120 tabs 12/05/23   
[Rx Last Taken Unknown]    
naproxen 500 mg tablet (Naprosyn) 500 mg PO BID PRN pain #20 tabs 02/18/24 [Rx   
Last Taken Unknown]    
azithromycin 250 mg tablet (Zithromax) 250 mg PO DAILY 4 days #4 tabs 03/09/24   
[Rx Last Taken Unknown]    
prednisone 20 mg tablet 40 mg (2 x 20 mg) PO DAILY #6 tabs 03/09/24 [Rx Last   
Taken Unknown]    
    
    
                                            
    
    
    
Allergy/AdvReac Type Severity Reaction Status Date / Time    
     
latex Allergy  Itching Verified 03/09/24 17:39    
     
Sulfa (Sulfonamide Allergy  Hives Verified 03/09/24 17:39    
    
Antibiotics)         
     
zolpidem [From Ambien] Allergy  Other Verified 03/09/24 17:39    
    
    
    
Family History (Reviewed 03/09/24 @ 20:05 by Dr. Janett Cummins MD)    
Uncle   Alcoholism    
   Cancer    
Sister   Anxiety    
   Asthma    
   Arthritis    
   Hypertension    
Father   Arthritis    
   Cancer    
   Heart disease    
   High cholesterol    
   CVA (cerebral vascular accident)    
   Suicide    
Grandmother   Cancer    
   Seizures    
Aunt   Cancer    
   Kidney disease    
Mother   Seizures    
Other   FH: birth defects    
    
    
    
Social History (Reviewed 03/09/24 @ 20:05 by Dr. Janett Cummins MD)    
Smoking Status:  Current every day smoker tobacco type: cigarettes     
alcohol intake:  never     
substance use type:  does not use     
what type of physical activity do you participate in:  none     
    
    
    
ROS    
ROS ED    
Constitutional    
Constitutional ED: Denies chills    
Eyes    
Eyes: Denies change in vision or discharge from eye(s)    
ENT    
ENT ED: Reports sore throat; Denies discharge from eye(s) or rhinorrhea    
Cardiovascular    
Cardiovascular: Denies chest pain or palpitations    
Respiratory/Chest    
Respiratory/Chest: Reports cough, dyspnea and sputum    
Gastrointestinal    
Gastrointestinal: Denies abdominal pain, nausea or vomiting    
Genitourinary    
Genitourinary ED: Denies dysuria    
Musculoskeletal    
Musculoskeletal: Denies back pain or extremity pain    
Integumentary    
Denies Abrasions or rash    
Neurologic    
Neurologic: Denies headache(s) or weakness    
Allergic/Immunologic    
Allergic/Immunologic ED: Denies lip swelling or urticaria    
    
EXAM    
Physical Exam    
Const    
Vital Signs:     
    
                                            
    
    
    
 03/09/24    
17:37    
     
Temperature 97.4 F L    
     
Temperature Source Temporal    
     
Pulse Rate 101 H    
     
Respiratory Rate 20 H    
     
Blood Pressure 151/94 H    
     
Blood Pressure Mean 113    
     
Pulse Ox 97    
     
Oxygen Delivery Method Room Air    
    
    
    
    
Positive well nourished and well developed    
General Appearance ED: well developed    
HEENT    
Reports moist mucous membranes    
Eyes    
EOMs intact bilaterally    
Chest Wall    
inspection of chest normal and palpation of chest normal    
Resp    
normal respiratory effort    
Resp Narrative:     
Slightly decreased air movement bilaterally with mild expiratory wheeze.    
Cardio    
regular rate and regular rhythm    
GI    
non-tender    
Palpation: soft    
Extremity    
normal to inspection    
Neuro    
oriented x3 and no sensory deficits noted    
Motor Exam: strength 5/5 throughout    
Psych    
mental status grossly normal    
Skin    
no rashes or lesions noted    
    
MDM    
MDM    
MDM Narrative    
Medical decision making narrative:     
1 view chest x-ray obtained in triage.  Per my interpretation no obvious   
infiltrate.  Radiology interpretation reviewed and agrees.    
With patient having 1 week of symptoms that are worsening with underlying asthma  
I will cover her with an albuterol inhaler and prednisone.  I will also give her  
a course of Zithromax.  Patient was advised that this is a viral infection the   
antibiotics will likely not help.  I do feel that the steroid and inhaler will   
help with her underlying asthma.    
Radiography    
Diagnostic Testing:     
    
Clinical Impression(s) from Imaging Studies    
    
Chest X-Ray  03/09/24 18:25    
IMPRESSION:    
     
No radiographic evidence of acute cardiopulmonary disease.    
     
Electronically Signed:    
Jose Najera MD    
2024/03/09 at 19:02 EST    
Reading Location ID and State: [id number] / [place]    
Tel [phone], Service support  [phone], Fax [phone]    
     
    
    
    
    
    
Discharge Plan    
Triage    
Chief Complaint: Cough    
    
ED Provider: Janett Cummins    
    
Dx/Rx/DC Orders    
Clinical Impression:    
 Asthma exacerbation, Bronchitis    
    
    
Instructions:  ED Bronchitis with Wheezing (Adult)    
    
Prescriptions:    
New    
  azithromycin [Zithromax] 250 mg tablet     
   250 mg PO DAILY 4 Days Qty: 4 0RF    
   Rx Instructions:    
   start on day 2 of therapy    
  prednisone 20 mg tablet     
   40 mg PO DAILY Qty: 6 0RF    
    
No Action    
  desmopressin 0.2 mg tablet     
     PO      
  trospium 20 mg tablet     
   20 mg PO      
   Patient Comments:    
   TAKE 1 TABLET BY MOUTH TWICE DAILY    
  naproxen [Naprosyn] 500 mg tablet     
   500 mg PO BID PRN (Reason: pain) Qty: 20 0RF    
  metformin 500 mg tablet     
   See Rx Instructions  .ROUTE .COMPLEX Qty: 120 0RF    
   Dose Instruction:    
   Take 2 tablets by mouth twice daily    
   Rx Instructions:    
   Take 2 tablets by mouth twice daily    
  glimepiride 2 mg tablet     
   See Rx Instructions  .ROUTE .COMPLEX Qty: 60 0RF    
   Dose Instruction:    
   Take 1 tablet by mouth twice daily    
   Rx Instructions:    
   Take 1 tablet by mouth twice daily    
    
Primary Care Provider: Care Physician,No Primary    
    
Referrals:    
Chicho Jolly MD [Med Staff - Active Staff] - As Needed    
Care Physician,No Primary [Primary Care Provider] -     
    
Disposition    
***Disposition***: Home, Self Care

## 2024-03-09 NOTE — XMS RPT_ITS
Comprehensive CCD (C-CDA v2.1)  
  
                            Created on: 2024  
  
  
MS. LINDA FRANKIE HERNANDEZ  
External Reference #: CDR,PersonID:[id number]  
: 1990  
Sex: Female  
  
Demographics  
  
  
                                        Address             [address] [place] [address]  [address]  
   
                                        Home Phone          [phone]  
   
                                        Work Phone          [phone]  
   
                                        Home Phone          [phone]  
   
                                        Preferred Language  en  
   
                                        Marital Status      Single  
   
                                        Anglican Affiliation Unknown  
   
                                        Race                White  
   
                                        Ethnic Group        Not  or Lati  
no  
  
  
Author  
  
  
                                        Name                Unknown  
   
                                        Address             [address]  
[address]  
[place]  [address]  
   
                                        Phone               [phone]  
   
                                        Organization        [facility]  
  
  
Care Team Providers  
  
  
                                Care Team Member Name Role            Phone  
   
                                DRU LAM (MD) Referring       Unav  
ailable  
   
                                FABIANA MOLINA, ARISTIDES FERGUSON Attending       Unavail  
tim LAM MD., [facility]    Unavail  
Dru Manley MD Primary Care Provider   
[phone]  
   
                                NESS MOLINA, DRU Primary Care Physician ( [phone]  
   
                                Unavailable     Primary Care Provider UnavailDRU [facility]    Unavailab  
[facility]U [facility]    Unavailab  
SARA Becerra Attending       Unavailable  
   
                                DRU LAM Primary Care    Unavailab  
robbi  
  
  
  
Allergies  
  
  
                                                    Allergy   
Classification                          Reported   
Allergen(s)               Allergy Type              Date of   
Onset                     Reaction(s)               Facility  
   
                                                      
(20 sources)                            Latex;   
Translations:   
[LATEX]                                 Propensity to   
adverse   
reactions to   
drug   
(disorder)                                
9                         Itching                   [facility]   
Repository  
   
                                                      
(20 sources)                            Sulfonamides   
(Antibiotic);   
Translations:   
[SULFA   
(SULFONAMIDE   
ANTIBIOTICS)]                           Propensity to   
adverse   
reactions to   
drug   
(disorder)                                
8                         Hives                     [facility]   
Repository  
   
                                                      
(20 sources)                            zolpidem;   
Translations:   
[ZOLPIDEM]                Drug Allergy                
8                                       Mental Status   
Change                                  [facility]   
Repository  
   
                                                      
(1 source)                              Sulfonamides   
(Antibiotic);   
Translations:   
[sulfa drugs]   Drug allergy                                    [facility]  
  
  
  
Medications  
Current Medications  
  
  
  
                      Medication Drug Class(es) Dates      Sig (Normalized) Sig   
(Original)  
   
                                                    benzonatate 100 mg   
oral capsule  
(1 source)                              Non-narcotic   
Antitussive                             Start:   
2024  
End:   
2024                              take 1 capsule by   
mouth every eight   
hours as needed                         benzonatate   
(TESSALON PERLE)   
100 mg capsule   
Take 1 capsule by   
mouth three times   
a day as needed   
for cough for up   
to 7 days. 21   
capsule 0   
2024 Active  
  
  
  
                                          
   
                                          
   
                                          
   
                                          
   
                                          
   
                                          
   
                                          
   
                                          
   
                                          
   
                                          
   
                                          
   
                                          
   
                                          
   
                                          
   
                                          
   
                                          
  
  
  
Completed/Discontinued Medications  
  
  
  
                      Medication Drug Class(es) Dates      Sig (Normalized) Sig   
(Original)  
   
                                                    acetaminophen 500 mg   
oral tablet  
(20 sources)                                                take 1 tablet by   
mouth every eight   
hours as needed                         acetaminophen   
(TYLENOL) 500 mg   
tablet Take 500 mg by   
mouth every 8 hours   
as needed. 0 Active  
  
  
  
                                          
   
                                          
   
                                          
   
                                          
   
                                          
   
                                          
   
                                          
   
                                          
   
                                          
   
                                          
   
                                          
   
                                          
   
                                          
   
                                          
   
                                          
   
                                          
   
                                          
   
                                          
   
                                          
   
                                          
   
                                          
   
                                          
   
                                          
   
                                          
   
                                          
   
                                          
   
                                          
   
                                          
   
                                          
   
                                          
   
                                          
   
                                          
   
                                          
   
                                          
   
                                          
   
                                          
   
                                          
   
                                          
   
                                          
   
                                          
   
                                          
   
                                          
   
                                          
   
                                          
   
                                          
   
                                          
   
                                          
   
                                          
   
                                          
   
                                          
   
                                          
   
                                          
   
                                          
   
                                          
   
                                          
   
                                          
   
                                          
   
                                          
   
                                          
   
                                          
  
  
  
Problems  
Active Problems  
  
  
                      Problem Classification Problem    Date       Documented Da  
te Episodic/Chronic  
   
                                                    Allergic reactions  
(1 source)                              Allergy to   
sulfonamide;   
Translations:   
[Allergy status to   
sulfonamides status]                                         Episodic  
   
                                                    Anxiety disorders  
(20 sources)                            Anxiety;   
Translations:   
[Anxiety disorder,   
unspecified]                            2018          Chronic  
   
                                                    Asthma  
(20 sources)                            Mild intermittent   
asthma; Translations:   
[Mild intermittent   
asthma,   
uncomplicated]                          Onset:   
2008                Chronic  
   
                                                    Chronic ulcer of skin  
(1 source)                              Pressure ulcer of   
unspecified ankle,   
unspecified stage;   
Translations:   
[Controlled type 2   
diabetes mellitus   
with pressure ulcer   
of ankle (HCC) (HCC)]                   Onset:   
2024                                          Chronic  
   
                                                    Coma; stupor; and   
brain damage  
(1 source)                              Somnolence;   
Translations:   
[Somnolence]                            Onset:   
2019                                          Episodic  
   
                                                    Deficiency and other   
anemia  
(20 sources)                            Anemia; Translations:   
[Anemia, unspecified]                     10-          Episodic  
   
                                                    Diabetes mellitus with   
complications  
(20 sources)                            Type II diabetes   
mellitus   
uncontrolled;   
Translations: [Type 2   
diabetes mellitus   
with hyperglycemia]                     Onset:   
2022                                          Chronic  
   
                                                    Diabetes mellitus   
without complication  
(20 sources)                            Type 2 diabetes   
mellitus;   
Translations: [Type 2   
diabetes mellitus   
without   
complications]                          Onset:   
2024                Chronic  
   
                                                    Disorders usually   
diagnosed in infancy,   
childhood, or   
adolescence  
(20 sources)                            Attention deficit   
hyperactivity   
disorder,   
predominantly   
inattentive type;   
Translations: [Other   
specified behavioral   
and emotional   
disorders with onset   
usually occurring in   
childhood and   
adolescence]                            Onset:   
2008                Chronic  
   
                                                    E Codes: Fall  
(1 source)                              Fall; Translations:   
[Unspecified fall,   
initial encounter]                                          Episodic  
   
                                                    Genitourinary symptoms   
and ill-defined   
conditions  
(20 sources)                            Nocturnal enuresis;   
Translations:   
[Nocturnal enuresis]                    Onset:   
2009                Chronic  
   
                                                    Genitourinary symptoms   
and ill-defined   
conditions  
(4 sources)                             Increased frequency   
of urination;   
Translations:   
[Frequency of   
micturition]                                                Episodic  
   
                                                    Headache; including   
migraine  
(20 sources)                            Migraine without   
aura, not refractory   
; Translations:   
[Migraine without   
aura, not   
intractable, without   
status migrainosus]                     Onset:   
2008                Chronic  
   
                                                    Headache; including   
migraine  
(1 source)                              Headache;   
Translations:   
[Headache,   
unspecified headache   
type]                                   2023          Episodic  
   
                                                    Inflammatory diseases   
of female pelvic   
organs  
(1 source)                              Acute vaginitis;   
Translations: [Acute   
vaginitis]                                                  Episodic  
   
                                                    Miscellaneous mental   
health disorders  
(20 sources)                            Dissociative   
convulsions;   
Translations:   
[Conversion disorder   
with seizures or   
convulsions]                            Onset:   
2019                Chronic  
   
                                                    Mood disorders  
(20 sources)                            Bipolar disorder;   
Translations:   
[Bipolar disorder,   
unspecified]                            Onset:   
2008                Chronic  
   
                                                    Mycoses  
(1 source)                              Candidiasis;   
Translations:   
[Candidiasis,   
unspecified]                                                Episodic  
   
                                                    Other congenital   
anomalies  
(20 sources)                            Fragile X chromosome;   
Translations:   
[Fragile X   
chromosome]                             2018          Chronic  
   
                                                    Other diseases of   
bladder and urethra  
(2 sources)                             Overactive bladder;   
Translations:   
[Overactive bladder]                                         Chronic  
   
                                                    Other female genital   
disorders  
(1 source)                              Pruritus of vagina;   
Translations: [Other   
specified   
noninflammatory   
disorders of vagina]                                         Episodic  
   
                                                    Other fractures  
(1 source)                              Closed fracture of   
one rib;   
Translations:   
[Fracture of one rib,   
right side,   
subsequent encounter   
for fracture with   
routine healing]                                            Episodic  
   
                                                    Other gastrointestinal   
disorders  
(1 source)                              Loose stool;   
Translations: [Other   
fecal abnormalities]                                         Episodic  
   
                                                    Other nervous system   
disorders  
(20 sources)                            H/O: migraine;   
Translations:   
[Personal history of   
other diseases of the   
nervous system and   
sense organs]                           2018          Episodic  
   
                                                    Other non-traumatic   
joint disorders  
(1 source)                              Chronic pain of right   
upper limb;   
Translations: [Pain   
in right shoulder]                                          Episodic  
   
                                                    Other nutritional;   
endocrine; and   
metabolic disorders  
(20 sources)                            Obese class I;   
Translations:   
[Obesity,   
unspecified]                            Onset:   
2018                Chronic  
   
                                                    Other screening for   
suspected conditions   
(not mental disorders   
or infectious disease)  
(1 source)                              Patient encounter   
status; Translations:   
[Encounter for   
screening for lipoid   
disorders]                                                  Episodic  
   
                                                    Other upper   
respiratory infections  
(1 source)                              Pharyngitis;   
Translations: [Acute   
pharyngitis,   
unspecified]                            2024          Episodic  
   
                                                    Superficial injury;   
contusion  
(1 source)                              Contusion of chest;   
Translations:   
[Contusion of thorax,   
unspecified, initial   
encounter]                              Onset:   
2022                                          Episodic  
   
                                                    Unclassified  
(1 source)      OPENED IN ERROR                                   
   
                                                    Urinary tract   
infections  
(1 source)                              Acute cystitis;   
Translations: [Acute   
cystitis without   
hematuria]                                                  Episodic  
   
                                                    Viral infection  
(20 sources)                            Herpes labialis;   
Translations:   
[Herpesviral   
vesicular dermatitis]                     2018          Episodic  
  
  
Past or Other Problems  
  
  
                                                    Problem   
Classification  Problem         Date            Documented Date Episodic/Chronic  
   
                                                    Other connective   
tissue disease  
(20 sources)                            Enthesopathy of   
ankle AND/OR tarsus;   
Translations: [Other   
enthesopathy of   
unspecified foot and   
ankle]                                  Onset:   
2009                Episodic  
   
                                                    Other nervous system   
disorders  
(20 sources)                            Abnormal gait;   
Translations:   
[Unspecified   
abnormalities of   
gait and mobility]                      Onset:   
2009                Episodic  
   
                                                    Residual codes;   
unclassified  
(20 sources)                            H/O: urinary   
disease;   
Translations:   
[Personal history of   
other specified   
conditions]                             Onset:   
2018                Episodic  
  
  
  
Results  
  
  
                      Test Name  Value      Interpretation Reference Range Facil  
it  
  
  
  
                                          
   
                                          
   
                                          
   
                                          
   
                                          
   
                                          
   
                                          
   
                                          
   
                                          
   
                                          
   
                                          
   
                                          
   
                                          
   
                                          
   
                                          
   
                                          
   
                                          
   
                                          
   
                                          
   
                                          
   
                                          
   
                                          
   
                                          
   
                                          
   
                                          
   
                                          
   
                                          
   
                                          
   
                                          
   
                                          
   
                                          
   
                                          
   
                                          
   
                                          
   
                                          
   
                                          
   
                                          
   
                                          
   
                                          
   
                                          
   
                                          
   
                                          
   
                                          
   
                                          
   
                                          
   
                                          
   
                                          
   
                                          
   
                                          
   
                                          
   
                                          
   
                                          
   
                                          
   
                                          
   
                                          
   
                                          
   
                                          
   
                                          
   
                                          
   
                                          
   
                                          
   
                                          
   
                                          
   
                                          
   
                                          
   
                                          
   
                                          
   
                                          
   
                                          
   
                                          
   
                                          
   
                                          
   
                                          
   
                                          
   
                                          
   
                                          
   
                                          
   
                                          
   
                                          
   
                                          
   
                                          
   
                                          
   
                                          
   
                                          
   
                                          
   
                                          
   
                                          
   
                                          
   
                                          
   
                                          
   
                                          
   
                                          
   
                                          
   
                                          
   
                                          
   
                                          
   
                                          
   
                                          
   
                                          
   
                                          
   
                                          
   
                                          
   
                                          
   
                                          
   
                                          
   
                                          
   
                                          
   
                                          
   
                                          
   
                                          
   
                                          
   
                                          
   
                                          
   
                                          
   
                                          
   
                                          
   
                                          
   
                                          
   
                                          
   
                                          
   
                                          
   
                                          
   
                                          
   
                                          
   
                                          
   
                                          
   
                                          
   
                                          
   
                                          
   
                                          
   
                                          
   
                                          
   
                                          
   
                                          
   
                                          
   
                                          
   
                                          
   
                                          
   
                                          
   
                                          
   
                                          
   
                                          
   
                                          
   
                                          
   
                                          
   
                                          
   
                                          
   
                                          
   
                                          
   
                                          
   
                                          
   
                                          
   
                                          
   
                                          
   
                                          
   
                                          
   
                                          
   
                                          
   
                                          
   
                                          
   
                                          
   
                                          
   
                                          
   
                                          
   
                                          
   
                                          
   
                                          
   
                                          
   
                                          
   
                                          
   
                                          
   
                                          
   
                                          
   
                                          
   
                                          
   
                                          
   
                                          
   
                                          
   
                                          
   
                                          
   
                                          
   
                                          
   
                                          
   
                                          
   
                                          
   
                                          
   
                                          
   
                                          
   
                                          
   
                                          
   
                                          
   
                                          
   
                                          
   
                                          
   
                                          
   
                                          
   
                                          
   
                                          
   
                                          
   
                                          
   
                                          
   
                                          
   
                                          
   
                                          
  
  
  
Vital Signs  
  
  
                          Date Time    Vital Sign   Value        Performing   
Clinician                               Facility  
   
                                                    2024   
19:          Body temperature    98.01 [degF]        Alfonso LUTZ.CNP  
Work Phone:   
[phone]                          [facility]  
   
                                                    2024   
19:          Body weight         89 kg               Alfonso LUTZ.CNP  
Work Phone:   
[phone]                          [facility]  
   
                                                    2024   
19:                              Diastolic blood   
pressure                  74 mm[Hg]                 Alfonso Valles APRN.CNP  
Work Phone:   
[phone]                          [facility]  
   
                                                    2024   
19:          Heart rate          92 /min             Alfonso LUTZ.CNP  
Work Phone:   
[phone]                          [facility]  
   
                                                    2024   
19:          Respiratory rate    19 /min             Alfonso Valles APRN.CNP  
Work Phone:   
[phone]                          [facility]  
   
                                                    2024   
19:                              SaO2% (BldA) [Mass   
fraction]                 98 %                      Alfonso Pendlebury   
APRN.CNP  
Work Phone:   
[phone]                          [facility]  
   
                                                    2024   
19:                              Systolic blood   
pressure                  100 mm[Hg]                Alfonso Bernicebury   
APRN.CNP  
Work Phone:   
[phone]                          [facility]  
   
                                                    2023   
18:          Body temperature    98.01 [degF]        [facility]   
AP[facility].CNP  
Work Phone:   
[phone]                          [facility]  
   
                                                    2023   
18:          Body weight         83.73 kg            Alfonso Valles   
APRN.CNP  
Work Phone:   
[phone]                          [facility]  
   
                                                    2023   
18:                              Diastolic blood   
pressure                  104 mm[Hg]                Alfonso Queenbury   
APRN.CNP  
Work Phone:   
[phone]                          [facility]  
   
                                                    2023   
18:          Heart rate          102 /min            Alfonso Valles   
APRN.CNP  
Work Phone:   
[phone]                          [facility]  
   
                                                    2023   
18:          Respiratory rate    18 /min             [facility]   
AP[facility].C[facility]  
Work Phone:   
[phone]                          [facility]  
   
                                                    2023   
18:                              SaO2% (BldA) [Mass   
fraction]                 97 %                      [facility]   
APRN.CNP  
Work Phone:   
[phone]                          [facility]  
   
                                                    2023   
18:                              Systolic blood   
pressure                  138 mm[Hg]                [facility]   
APRN.CNP  
Work Phone:   
[phone]                          [facility]  
   
                                                    2023   
18:          Body temperature    97.81 [degF]        Krislyn Aberegg PA  
Work Phone:   
[phone]                          [facility]  
   
                                                    2023   
18:          Body weight         80.29 kg            Krislyn Aberegg PA  
Work Phone:   
[phone]                          [facility]  
   
                                                    2023   
18:                              Diastolic blood   
pressure                  80 mm[Hg]                 Krislyn Aberegg PA  
Work Phone:   
[phone]                          [facility]  
   
                                                    2023   
18:          Heart rate          108 /min            Krislyn Aberegg PA  
Work Phone:   
[phone]                          [facility]  
   
                                                    2023   
18:          Respiratory rate    16 /min             Krislyn Aberegg PA  
Work Phone:   
[phone]                          [facility]  
   
                                                    2023   
18:                              SaO2% (BldA) [Mass   
fraction]                 97 %                      Krislyn Aberegg PA  
Work Phone:   
[phone]                          [facility]  
   
                                                    2023   
18:                              Systolic blood   
pressure                  122 mm[Hg]                Krislyn Aberegg PA  
Work Phone:   
[phone]                          [facility]  
   
                                                    2023   
16:          Body height         160 cm              Korey Saleem PA-C  
Work Phone:   
[phone]                          [facility]  
   
                                                    2023   
16:          Body temperature    97.39 [degF]        Korey Saleem PA-C  
Work Phone:   
[phone]                          [facility]  
   
                                                    2023   
16:          Body weight         81.19 kg            Korey Saleem PA-C  
Work Phone:   
[phone]                          [facility]  
   
                                                    2023   
16:                              Diastolic blood   
pressure                  96 mm[Hg]                 Korey Saleem PA-C  
Work Phone:   
[phone]                          [facility]  
   
                                                    2023   
16:          Heart rate          102 /min            Korey Saleem PA-C  
Work Phone:   
[phone]                          [facility]  
   
                                                    2023   
16:          Respiratory rate    12 /min             Korey Saleem PA-C  
Work Phone:   
[phone]                          [facility]  
   
                                                    2023   
16:                              SaO2% (BldA) [Mass   
fraction]                 96 %                      Korey Saleem PA-C  
Work Phone:   
[phone]                          [facility]  
   
                                                    2023   
16:                              Systolic blood   
pressure                  134 mm[Hg]                Korey Saleem PA-C  
Work Phone:   
[phone]                          [facility]  
   
                                                    2023   
15:          Body temperature    98.01 [degF]        Olimpia Ritesh   
APRN.CNP  
Work Phone:   
[phone]                          [facility]  
   
                                                    2023   
15:          Body weight         85.28 kg            Olimpia Awad   
APRN.CNP  
Work Phone:   
[phone]                          [facility]  
   
                                                    2023   
15:                              Diastolic blood   
pressure                  70 mm[Hg]                 Olimpia Awad   
APRN.CNP  
Work Phone:   
[phone]                          [facility]  
   
                                                    2023   
15:          Heart rate          113 /min            Olimpia Awad   
APRN.CNP  
Work Phone:   
[phone]                          [facility]  
   
                                                    2023   
15:          Respiratory rate    20 /min             Olimpia Awad   
APRN.CNP  
Work Phone:   
[phone]                          [facility]  
   
                                                    2023   
15:                              SaO2% (BldA) [Mass   
fraction]                 98 %                      Olimpia Awad   
APRN.CNP  
Work Phone:   
[phone]                          [facility]  
   
                                                    2023   
15:                              Systolic blood   
pressure                  104 mm[Hg]                Olimpia Awad   
APRN.CNP  
Work Phone:   
[phone]                          [facility]  
   
                                                    2023   
18:          Body weight         86.18 kg            Natalia Podlogar   
APRN.CNP  
Work Phone:   
[phone]                          [facility]  
   
                                                    2023   
18:                              Diastolic blood   
pressure                  88 mm[Hg]                 Natalia Podlogar   
APRN.CNP  
Work Phone:   
[phone]                          [facility]  
   
                                                    2023   
18:          Heart rate          114 /min            Natalia Podlogar   
APRN.CNP  
Work Phone:   
[phone]                          [facility]  
   
                                                    2023   
18:          Respiratory rate    18 /min             Natalia Podlogar   
APRN.CNP  
Work Phone:   
[phone]                          [facility]  
   
                                                    2023   
18:                              SaO2% (BldA) [Mass   
fraction]                 98 %                      Natalia Podlogar   
APRN.CNP  
Work Phone:   
[phone]                          [facility]  
   
                                                    2023   
18:                              Systolic blood   
pressure                  132 mm[Hg]                Natalia Podlogar   
APRN.CNP  
Work Phone:   
[phone]                          [facility]  
   
                                                    2022   
10:          Body height         165 cm              ARISTIDES JUNG MD  
Work Phone:   
[phone]                           [facility]  
   
                                                    2022   
10:          Body temperature    98.24 [degF]        ARISTIDES JUNG MD  
Work Phone:   
[phone]                           [facility]  
   
                                                    2022   
10:          Body weight         91 kg               ARISTIDES JUNG MD  
Work Phone:   
[phone]                           [facility]  
   
                                                    2022   
10:                              Diastolic Blood   
Pressure Non-Invasive     96 1                      ARISTIDES JUNG MD  
Work Phone:   
[phone]                           [facility]  
   
                                                    2022   
10:          Heart rate          100 /min            ARISTIDES JUNG MD  
Work Phone:   
[phone]                           [facility]  
   
                                                    2022   
10:          Respiratory rate    16 /min             ARISTIDES JUNG MD  
Work Phone:   
[phone]                           [facility]  
   
                                                    2022   
10:                              Systolic Blood   
Pressure Non-Invasive     152 1                     ARISTIDES JUNG MD  
Work Phone:   
[phone]                           [facility]  
   
                                                    2022   
17:          Body temperature    98.6 [degF]         Marce Chloé APRN.CNP  
Work Phone:   
[phone]                          [facility]  
   
                                                    2022   
17:          Body weight         73.12 kg            Marce Chloé APRN.CNP  
Work Phone:   
[phone]                          [facility]  
   
                                                    2022   
17:                              Diastolic blood   
pressure                  82 mm[Hg]                 Marce Chloé APRN.CNP  
Work Phone:   
[phone]                          [facility]  
   
                                                    2022   
17:          Heart rate          102 /min            Marce Chloé APRN.CNP  
Work Phone:   
[phone]                          [facility]  
   
                                                    2022   
17:          Respiratory rate    21 /min             Marce Luevano APRN.CNP  
Work Phone:   
[phone]                          [facility]  
   
                                                    2022   
17:                              SaO2% (BldA) [Mass   
fraction]                 99 %                      Marce Luevano APRN.CNP  
Work Phone:   
[phone]                          [facility]  
   
                                                    2022   
17:                              Systolic blood   
pressure                  108 mm[Hg]                Marce Luevano APRN.CNP  
Work Phone:   
[phone]                          [facility]  
   
                                                    2022   
11:          Body temperature    96.91 [degF]        Raj Gant   
APRN.CNP  
Work Phone:   
[phone]                          [facility]  
   
                                                    2022   
11:          Body weight         72.58 kg            Raj Gant   
APRN.CNP  
Work Phone:   
[phone]                          [facility]  
   
                                                    2022   
11:                              Diastolic blood   
pressure                  86 mm[Hg]                 Raj Gant   
APRN.CNP  
Work Phone:   
[phone]                          [facility]  
   
                                                    2022   
11:          Heart rate          86 /min             Raj Gant   
APRN.CNP  
Work Phone:   
[phone]                          [facility]  
   
                                                    2022   
11:          Respiratory rate    16 /min             Raj Gant   
APRN.CNP  
Work Phone:   
[phone]                          [facility]  
   
                                                    2022   
11:                              Systolic blood   
pressure                  120 mm[Hg]                Raj Gant   
APRN.CNP  
Work Phone:   
[phone]                          [facility]  
  
  
  
Encounters  
  
  
                          Encounter Date Encounter Type Care Provider Facility  
   
                                                    Start: 2024  
End: 2024     ambulatory          [facility] Facility:[facility]  
   
                                                    Start: 2024  
End: 2024                         Office outpatient visit   
25 minutes                              Alfonso Valles   
APRN.CNP  
Work Phone:   
[phone]                          [facility]  
  
  
  
                                          
   
                                          
   
                                          
   
                                          
   
                                          
   
                                          
   
                                          
   
                                          
   
                                          
   
                                          
   
                                          
   
                                          
   
                                          
   
                                          
   
                                          
   
                                          
   
                                          
   
                                          
   
                                          
   
                                          
   
                                          
   
                                          
   
                                          
   
                                          
   
                                          
   
                                          
   
                                          
   
                                          
   
                                          
   
                                          
   
                                          
   
                                          
   
                                          
   
                                          
   
                                          
   
                                          
   
                                          
   
                                          
   
                                          
   
                                          
   
                                          
   
                                          
   
                                          
   
                                          
   
                                          
   
                                          
   
                                          
   
                                          
   
                                          
   
                                          
   
                                          
   
                                          
   
                                          
   
                                          
   
                                          
   
                                          
   
                                          
   
                                          
   
                                          
   
                                          
   
                                          
   
                                          
   
                                          
  
  
  
Procedures  
  
  
                          Date         Procedure    Procedure Detail Performing   
Clinician  
   
                          Start: 2024 STREP A MOLECULAR (POC)               
 [facility] Provider  
   
                                        Start: 11-   Gluc bld gluc mntr d  
ev   
cleared fda spec home use                           [facility]  
   
                                        Start: 2023   Urnls dip stick/tabl  
et   
rgnt auto w/o microscopy                            Korey Saleem PA-C  
Work Phone:   
[phone]  
   
                                        Start: 2023   Urnls dip stick/tabl  
et   
rgnt auto w/o microscopy                            Ileana Worrell APRN.CNP  
Work Phone:   
[phone]  
   
                                        Start: 2022   Urnls dip stick/tabl  
et   
rgnt auto w/o microscopy                            [facility]  
   
                                        Start: 2022   Urnls dip stick/tabl  
et   
rgnt auto w/o microscopy                            Raj Gant APRN.CNP  
Work Phone:   
[phone]  
   
                                        Start: 2022   Adult depression   
screening assessment                                Natalia Ospina APRN.CNP  
Work Phone:   
[phone]  
  
  
  
Plan of Treatment  
  
  
                          Date         Care Activity Detail       Author  
   
                                                    Start:   
2030                              Urine microalbumin   
profile                                             [facility]  
   
                                                    Start:   
2025          HPV TESTING         HPV TESTING         [facility]  
   
                                                    Start:   
2025          PAP TESTING         PAP TESTING         [facility]  
   
                                                    Start:   
2025                              Screening for malignant   
neoplasm of cervix                                  [facility]  
   
                                                    Start:   
2025                              Hepatitis B surface   
antibody level            LDL Cholesterol           [facility]  
   
                                                    Start:   
2024                              ANNUAL PCP TEAM CHRONIC   
DISEASE VISIT                           ANNUAL PCP TEAM CHRONIC   
DISEASE VISIT                           [facility]  
   
                                                    Start:   
2024          Depression Assessment Depression Assessment [facility]  
   
                                                    Start:   
2023          Influenza vaccination Influenza Vaccine (#1) Bryce nunez  
   
                                                    Start:   
2023                              ANNUAL PCP TEAM CHRONIC   
DISEASE VISIT                           ANNUAL PCP TEAM CHRONIC   
DISEASE VISIT                           [facility]  
   
                                                    Start:   
07-                              ANNUAL PCP TEAM CHRONIC   
DISEASE VISIT                           ANNUAL PCP TEAM CHRONIC   
DISEASE VISIT                           [facility]  
   
                                                    Start:   
2023                              ANNUAL PCP TEAM CHRONIC   
DISEASE VISIT                           ANNUAL PCP TEAM CHRONIC   
DISEASE VISIT                           [facility]  
   
                                                    Start:   
06-                Hepatitis B screening     URINE ALBUMIN:CREATININE   
RATIO                                   [facility]  
   
                                                    Start:   
06-                              Hepatitis B surface   
antibody level            LDL CHOLESTEROL           [facility]  
   
                                                    Start:   
2023                              3 comp foot exam   
completed                 DIABETIC FOOT EXAM        [facility]  
   
                                                    Start:   
2023                              ANNUAL PCP TEAM CHRONIC   
DISEASE VISIT                           ANNUAL PCP TEAM CHRONIC   
DISEASE VISIT                           [facility]  
   
                                                    Start:   
2023                              Diabetic foot   
examination               Diabetic Foot Exam        [facility]  
   
                                                    Start:   
2023                              Adult depression   
screening assessment      DEPRESSION SCREENING      [facility]  
   
                                                    Start:   
2023                              ANNUAL PCP TEAM CHRONIC   
DISEASE VISIT                           ANNUAL PCP TEAM CHRONIC   
DISEASE VISIT                           [facility]  
   
                                                    Start:   
2023  
End: 2023                         Bacteria identified in   
Urine by Culture                        URINE CULTURE   
Microbiology Routine   
Urgency of urination   
Expected: 2023,   
Expires: 2023                     [facility]  
Work Phone:   
[phone]  
  
  
  
                                          
   
                                          
   
                                          
   
                                          
   
                                          
   
                                          
   
                                          
   
                                          
   
                                          
   
                                          
   
                                          
   
                                          
   
                                          
   
                                          
   
                                          
   
                                          
   
                                          
   
                                          
   
                                          
   
                                          
   
                                          
   
                                          
   
                                          
   
                                          
   
                                          
   
                                          
   
                                          
   
                                          
   
                                          
   
                                          
   
                                          
   
                                          
   
                                          
   
                                          
   
                                          
   
                                          
   
                                          
   
                                          
   
                                          
   
                                          
   
                                          
   
                                          
   
                                          
   
                                          
   
                                          
   
                                          
   
                                          
   
                                          
   
                                          
   
                                          
   
                                          
   
                                          
  
  
  
Immunizations  
  
  
                      Immunization Date Immunization Notes      Care Provider Fa  
[facility]  
   
                                        2020          tetanus and diphther  
ia   
toxoids, adsorbed,   
preservative free, for   
adult use (5 Lf of   
tetanus toxoid and 2 Lf   
of diphtheria toxoid)                               Natalia Podlogar   
APRN.[facility]  
Work Phone:   
[phone]                          [facility]  
   
                                        10-          influenza, injectabl  
e,   
quadrivalent,   
preservative free                                   Natlaia Podlogar   
APRN.CNP  
Work Phone:   
[phone]                          [facility]  
   
                                        10-          influenza virus vacc  
ine,   
unspecified formulation                             Alfonso Valles   
APRN.CNP  
Work Phone:   
[phone]                          [facility]  
   
                                        10-          pneumococcal   
polysaccharide vaccine,   
23 valent                                           Natalia Podlogar   
APRN.[facility]  
Work Phone:   
[phone]                          [facility]  
   
                                        2009          measles, mumps and   
rubella virus vaccine                               Natalia Podlogar   
APRN.CNP  
Work Phone:   
[phone]                          [facility]  
Work Phone:   
[phone]  
   
                                        2009          tetanus toxoid, redu  
emily   
diphtheria toxoid, and   
acellular pertussis   
vaccine, adsorbed                                   Natalia Podlogar   
APRN.CNP  
Work Phone:   
[phone]                          [facility]  
  
  
  
Payers  
  
  
                          Date         Payer Category Payer        Policy ID  
   
                          2023   Medicaid                  [id number]  
   
                                2023      Private Health Insurance [employer]   
[employer] MEDICAID   
[facility] [id number]   
2023-Present [address]   
[address] [place]   
[address] Medicaid                          [id number]  
[id number]  
   
                                2021      Unknown         [employer]  
[place]   
FULLY INSURED   
[id number]   
2021-Present   
[phone] [address]   
[place] [address] PPO                [id number]   
[id number]  
[id number]  
   
                                2021      Unknown         [employer]  
[employer]   
FULLY INSURED   
[id number]   
2021-Present   
[phone] [address]   
[place] [address] PPO                [id number]  
[id number]  
   
                          2021   Unknown                   [id number]  
   
                          1989   Unknown                   [id number]   
[id number]  
[id number]  
  
  
  
Social History  
  
  
                          Date         Type         Detail       Facility  
   
                                                    Start: 2020  
End: 2022                         Tobacco smoking status   
NHIS                      Smokes tobacco daily      [facility]  
   
                                                      
End: 2009     History of tobacco use Cigarette Smoker    [facility]  
   
                                                    Start: 2020  
End: 2024                         Cigarettes smoked current   
(pack per day) - Reported 0.5                       [facility]  
   
                                                    Start: 2020  
End: 2022           Tobacco use and exposure  Smokeless tobacco   
non-user                                [facility]  
   
                                                    Start: 2021  
End: 2024           Alcohol intake            Current drinker of   
alcohol (finding)                       [facility]  
   
                                        Start: 2022   History SDOH Alcohol  
   
Frequency                 2                         [facility]  
   
                                        Start: 10-   History SDOH Alcohol  
   
Comment                   rarely                    [facility]  
   
                                        Start: 2022   History SDOH Social   
Connections Phone         5                         [facility]  
   
                                        Start: 2022   History SDOH Social   
Connections Mosque        1                         [facility]  
   
                                        Start: 2022   History SDOH Social   
Connections Living        7                         [facility]  
   
                                        Start: 2022   History SDOH Physica  
l   
Activity DPW              6                         [facility]  
   
                                        Start: 2022   History SDOH [facility]ica  
l   
Activity MPS              10                        [facility]  
   
                          Start: 2022 History SDOH Financial 3              
[facility]  
   
                          Start: 2020 Education    12           [facility]  
   
                                                    Start: 2020  
End: 2022     Tobacco Comment     5-7 ciggs/day       [facility]  
   
                          Start: 1990 Sex Assigned At Birth Female       [facility]  
[facility]  
   
                                                    Start: 2022  
End: 2022                         Exposure to SARS-CoV-2   
(event)                   Not sure                  [facility]  
   
                                                    Start: 2022  
End: 2022                         Exposure to SARS-CoV-2   
(event)                   Unable to assess          [facility]  
Work Phone:   
[phone]  
   
                                Start: 2022 Tobacco smoking status Light t  
obacco smoker   
(finding)                               [facility]  
   
                                                    Start: 2020  
End: 2024                         Social connection and   
isolation panel                                     [facility]  
   
                                                            Do you belong to any  
   
clubs or organizations   
such as Buddhist groups,   
unions, fraternal or   
athletic groups, or   
school groups?            No                        [facility]  
   
                                                            Are you now ,  
   
, ,   
, never    
or living with a partner? Never              [facility]  
   
                                                            How often to you hav  
e a   
drink containing alcohol? Monthly or less           [facility]  
   
                                                            How many standard dr  
inks   
containing alcohol do you   
have on a typical day?    3 or 4                    [facility]  
   
                                                            How often do you hav  
e 6   
or more drinks on 1   
occasion?                 Less than monthly         [facility]  
   
                                                            How hard is it for y  
ou to   
pay for the very basics   
like food, housing,   
medical care, and heating Somewhat hard             [facility]  
   
                                                            Adult Depression   
Screening Assessment      4                         [facility]  
   
                                                            Do you feel stress -  
   
tense, restless, nervous,   
or anxious, or unable to   
sleep at night because   
your mind is troubled all   
the time - these days   
[OSQ]                     Very much                 [facility]  
   
                                                            (I/We) worried steven  
er   
(my/our) food would run   
out before (I/we) got   
money to buy more.        Never true                [facility]  
   
                                Start: 2020 Gender identity Identifies as   
female   
gender (finding)                        [facility]  
  
  
  
Medical Equipment  
  
  
                                Procedure Code  Equipment Code  Equipment Origin  
al   
Text                      Equipment Identifier      Dates  
   
                                                                Test blood sugar  
(s)   
1-2 times daily. Dx:   
Type 2 DM -   
Controlled E11.9   
Insulin: No                                         Start:   
2020  
  
  
  
                                          
  
  
  
Functional Status  
  
  
                          Date         Assessment   Result       Facility  
   
                          2022   Functional Status Up ad miguelina    [facility]  
rita [facility]  
  
  
  
Mental Status  
  
  
                          Date         Assessment   Result       Facility  
   
                          2022   Mental Status Orientation Oriented x 4 [facility]  
  
  
  
Clinical Notes 2018 to 2024  
  Alfonso Valles APRN.CNP - 2024 7:26 PM ESTTelephone Encounter -   
Susan Claudio RN - 2024 4:05 PM ESTTelephone Encounter - Susan Claudio
 RN - 2024 9:41 AM EST  
  
                                Note Date & Type Note            Facility  
   
                                        2024 Note     HNO ID: [id number]  
Author: ALFONSO VALLES APRN.CNP  
Service: ?  
Author Type: Nurse Practitioner  
Type: Progress Notes  
Filed: 2024 19:44  
Note Text:  
Subjective  
HPI  
Nontoxic-appearing female   
presents to urgent care with   
chief complaint of upper  
respiratory tract like   
infection. Duration of symptoms   
2 days. Associated  
symptoms sore throat, nasal   
congestion, nasal discharge and   
nonproductive cough.  
Patient denies the use of any   
over-the-counter medications or   
home remedies for  
symptom management. Patient   
states recent sick contacts with   
similar signs and  
symptoms. Patient denies any   
productive cough, fever, chest   
pain, shortness of  
breath, pleuritic pain, rash,   
abdominal pain, nausea, vomiting   
or change in  
bowel or bladder habit. Past   
medical history prescription   
medication use  
allergies reviewed. Denies   
chance of pregnancy. Is not   
breast-feeding.  
/74   Pulse 92   Temp 36.7   
?C (98 ?F)   Resp 19   Wt 89 kg   
(196 lb 3.2  
oz)   LMP (LMP Unknown)   SpO2   
98%   BMI 32.15 kg/m?  
.Patient presents with:  
Cough: Chest congestion, sore   
throat x 2 days  
PAST MEDICAL HISTORY  
Diagnosis Date  
Anemia  
Anxiety  
Autism spectrum  
Bipolar I disorder, most recent   
episode (or current) manic, mild   
(HCC)  
Diabetes type 2, controlled   
(HCC)  
Fragile x chromosome  
Herpes labialis  
Oral herpes  
History of epilepsy  
History of migraine  
History of tobacco use  
Other and unspecified ovarian   
cyst  
Ovarian cyst  
PTSD (post-traumatic stress   
disorder)  
Unspecified , without   
mention of complication,   
unspecified  
6 Miscarriages  
Unspecified asthma(493.90)  
Urge incontinence  
Korey Saleem  
PAST SURGICAL HISTORY  
Procedure Laterality Date  
NONE  
ALLERGIES Ambien [Zolpidem],   
Latex, and Sulfa (Sulfonamide   
Antibiotics)  
MEDICATIONS  
Diaper,Brief, Adult,Disposable   
(DISPOSABLE BRIEF) 1 Each daily   
at bedtime.  
Please Dispense Adult Pull ups   
with no tabs, pull up briefs.  
trospium (SANCTURA) 20 mg tablet   
Take 1 tablet by mouth two times   
a day.  
metFORMIN ER (GLUCOPHAGE XR) 500   
mg 24 hr tablet Take 2 tablets   
by mouth twice  
daily.  
glipiZIDE (GLUCOTROL XL) 10mg 24   
hr tablet Take 1 tablet by mouth   
once daily.  
empagliflozin (JARDIANCE) 10 mg   
tablet Take 1 tablet by mouth   
daily with  
breakfast. (Patient not taking:   
No sig reported)  
propranolol (INDERAL) 20 mg   
tablet Take 1 tablet by mouth   
twice daily.  
folic acid 1 mg tablet Take 2   
tablets by mouth once daily.   
(Patient not taking:  
Reported on 2023)  
SUMAtriptan (IMITREX) 100 mg   
tablet Take 1 tablet by mouth as   
needed. (Patient  
not taking: Reported on   
2023)  
sertraline (ZOLOFT) 50 mg tablet   
Take 1 tablet by mouth once   
daily. (Patient  
not taking: Reported on   
2023)  
albuterol HFA (VENTOLIN HFA) 90   
mcg/actuation inhaler Inhale 2   
Puffs as  
instructed every 4 hours as   
needed for Wheezing/Shortness of   
Breath. (Patient  
not taking: Reported on   
3/6/2023)  
blood sugar diagnostic (BLOOD   
GLUCOSE TEST) test strip Test   
blood sugar(s) 1-2  
times daily. Dx: Type 2 DM -   
Controlled E11.9 Insulin: No   
(Patient not taking:  
Reported on 2023)  
L.acid/L.casei/B.bif/B.melinda/FOS   
(PROBIOTIC BLEND ORAL) Take by   
mouth. (Patient  
not taking: Reported on   
2023)  
prenatal 25/iron fum/folic/dha   
(PRENATAL-1 ORAL) Take by mouth.   
(Patient not  
taking: Reported on 2023)  
lamoTRIgine (LAMICTAL) 100 mg   
tablet Take 1 tablet by mouth   
once daily.  
(Patient not taking: Reported on   
2023)  
acetaminophen (TYLENOL) 500 mg   
tablet Take 500 mg by mouth   
every 8 hours as  
needed. (Patient not taking:   
Reported on 3/6/2023)  
FAMILY HISTORY  
Adopted: Yes  
Problem Relation Age of Onset  
Heart Mother  
None Father  
UNKNOWN  
Colon Cancer Other  
unsure who  
Cancer Other  
lung, liver (unsure who was who)  
Social History  
Tobacco Use  
Smoking status: Every Day  
Packs/day: 0.50  
Years: 10.00  
Additional pack years: 0.00  
Total pack years: 5.00  
Types: Cigarettes  
Last attempt to quit: 2009  
Years since quittin.6  
Smokeless tobacco: Never  
Tobacco comments:  
5-7 ciggs/day  
Vaping Use  
Vaping Use: Never used  
Substance Use Topics  
Alcohol use: Yes  
Comment: rarely  
Drug use: No  
Review of Systems  
Constitutional: Positive for   
malaise/fatigue. Negative for   
chills and fever.  
HENT: Positive for congestion   
and sore throat. Negative for   
ear discharge, ear  
pain and sinus pain.  
Eyes: Negative for blurred   
vision, pain, discharge and   
redness.  
Respiratory: Positive for cough.   
Negative for hemoptysis, sputum   
production,  
shortness of breath, wheezing   
and stridor.  
Cardiovascular: Negative for   
chest pain.  
Gastrointestinal: Negative for   
abdominal pain, diarrhea, nausea   
and vomiting.  
Musculoskeletal: Positive for   
myalgias.  
Skin: Negative for itching and   
rash.  
Neurological: Negative for   
dizziness and headaches.  
Objective  
Physical Exam  
Constitutional:  
General: She is not in acute   
distress.  
Appearance: She is not   
diaphoretic.  
HENT:  
Head: Normocephalic.  
Jaw: No t (more content not   
included)...                            [facility]  
   
                                                    2024 History of Presen  
t   
illness Narrative                       Formatting of this note is   
different from the original.  
Subjective  
HPI  
Nontoxic-appearing female   
presents to urgent care with   
chief complaint of upper   
respiratory tract like   
infection. Duration of symptoms   
2 days. Associated symptoms sore   
throat, nasal congestion, nasal   
discharge and nonproductive   
cough. Patient denies the use of   
any over-the-counter medications   
or home remedies for symptom   
management. Patient states   
recent sick contacts with   
similar signs and symptoms.   
Patient denies any productive   
cough, fever, chest pain,   
shortness of breath, pleuritic   
pain, rash, abdominal pain,   
nausea, vomiting or change in   
bowel or bladder habit. Past   
medical history prescription   
medication use allergies   
reviewed. Denies chance of   
pregnancy. Is not   
breast-feeding.  
  
/74   Pulse 92   Temp 36.7   
C (98 F)   Resp 19   Wt 89 kg   
(196 lb 3.2 oz)   LMP (LMP   
Unknown)   SpO2 98%   BMI 32.15   
kg/m  
  
.Patient presents with:  
Cough: Chest congestion, sore   
throat x 2 days  
  
PAST MEDICAL HISTORY  
Diagnosis Date  
Anemia  
Anxiety  
Autism spectrum  
Bipolar I disorder, most recent   
episode (or current) manic, mild   
(HCC)  
Diabetes type 2, controlled   
(HCC)  
Fragile x chromosome  
Herpes labialis  
Oral herpes  
History of epilepsy  
History of migraine  
History of tobacco use  
Other and unspecified ovarian   
cyst  
Ovarian cyst  
PTSD (post-traumatic stress   
disorder)  
Unspecified , without   
mention of complication,   
unspecified  
6 Miscarriages  
Unspecified asthma(493.90)  
Urge incontinence  
Korey Saleem  
  
  
PAST SURGICAL HISTORY  
Procedure Laterality Date  
NONE  
  
ALLERGIES Ambien [Zolpidem],   
Latex, and Sulfa (Sulfonamide   
Antibiotics)  
  
MEDICATIONS  
Diaper,Brief, Adult,Disposable   
(DISPOSABLE BRIEF) 1 Each daily   
at bedtime. Please Dispense   
Adult Pull ups with no tabs,   
pull up briefs.  
trospium (SANCTURA) 20 mg tablet   
Take 1 tablet by mouth two times   
a day.  
metFORMIN ER (GLUCOPHAGE XR) 500   
mg 24 hr tablet Take 2 tablets   
by mouth twice daily.  
glipiZIDE (GLUCOTROL XL) 10mg 24   
hr tablet Take 1 tablet by mouth   
once daily.  
empagliflozin (JARDIANCE) 10 mg   
tablet Take 1 tablet by mouth   
daily with breakfast. (Patient   
not taking: No sig reported)  
propranolol (INDERAL) 20 mg   
tablet Take 1 tablet by mouth   
twice daily.  
folic acid 1 mg tablet Take 2   
tablets by mouth once daily.   
(Patient not taking: Reported on   
2023)  
SUMAtriptan (IMITREX) 100 mg   
tablet Take 1 tablet by mouth as   
needed. (Patient not taking:   
Reported on 2023)  
sertraline (ZOLOFT) 50 mg tablet   
Take 1 tablet by mouth once   
daily. (Patient not taking:   
Reported on 2023)  
albuterol HFA (VENTOLIN HFA) 90   
mcg/actuation inhaler Inhale 2   
Puffs as instructed every 4   
hours as needed for   
Wheezing/Shortness of Breath.   
(Patient not taking: Reported on   
3/6/2023)  
blood sugar diagnostic (BLOOD   
GLUCOSE TEST) test strip Test   
blood sugar(s) 1-2 times daily.   
Dx: Type 2 DM - Controlled E11.9   
Insulin: No (Patient not taking:   
Reported on 2023)  
L.acid/L.casei/B.bif/B.melinda/FOS   
(PROBIOTIC BLEND ORAL) Take by   
mouth. (Patient not taking:   
Reported on 2023)  
prenatal 25/iron fum/folic/dha   
(PRENATAL-1 ORAL) Take by mouth.   
(Patient not taking: Reported on   
2023)  
lamoTRIgine (LAMICTAL) 100 mg   
tablet Take 1 tablet by mouth   
once daily. (Patient not taking:   
Reported on 2023)  
acetaminophen (TYLENOL) 500 mg   
tablet Take 500 mg by mouth   
every 8 hours as needed.   
(Patient not taking: Reported on   
3/6/2023)  
  
FAMILY HISTORY  
Adopted: Yes  
Problem Relation Age of Onset  
Heart Mother  
None Father  
UNKNOWN  
Colon Cancer Other  
unsure who  
Cancer Other  
lung, liver (unsure who was who)  
  
Social History  
  
Tobacco Use  
Smoking status: Every Day  
Packs/day: 0.50  
Years: 10.00  
Additional pack years: 0.00  
Total pack years: 5.00  
Types: Cigarettes  
Last attempt to quit: 2009  
Years since quittin.6  
Smokeless tobacco: Never  
Tobacco comments:  
5-7 ciggs/day  
Vaping Use  
Vaping Use: Never used  
Substance Use Topics  
Alcohol use: Yes  
Comment: rarely  
Drug use: No  
  
Review of Systems  
Constitutional: Positive for   
malaise/fatigue. Negative for   
chills and fever.  
HENT: Positive for congestion   
and sore throat. Negative for   
ear discharge, ear pain and   
sinus pain.  
Eyes: Negative for blurred   
vision, pain, discharge and   
redness.  
Respiratory: Positive for cough.   
Negative for hemoptysis, sputum   
production, shortness of breath,   
wheezing and stridor.  
Cardiovascular: Negative for   
chest pain.  
Gastrointestinal: Negative for   
abdominal pain, diarrhea, nausea   
and vomiting.  
Musculoskeletal: Positive for   
myalgias.  
Skin: Negative for itching and   
rash.  
Neurological: Negative for   
dizziness and headaches.  
  
  
  
Objective  
Physical Exam  
Constitutional:  
General: She is not in acute   
distress.  
Appearance: She is not   
diaphoretic.  
HENT:  
Head: Normocephalic.  
Jaw: No trismus, tenderness,   
swelling or pain on movement.  
Right Ear: Tympanic membrane,   
ear canal and external ear   
normal.  
Left Ear: Tympanic membrane, ear   
canal and external ear normal.  
Nose: Congestion present.  
Mouth/Throat:  
Mouth: Mucous membranes are   
moist.  
Pharynx: Oropharynx is clear.   
Uvula midline. Posterior   
oropharyngeal erythema present.   
No pharyngeal swelling,   
oropharyngeal exudate or uvula   
swelling.  
Eyes:  
Conjunctiva/sclera: Conjunctivae   
normal.  
Pupils: Pupils are equal, round,   
and reactive to light.  
Cardiovascular:  
Rate and Rhythm: Normal rate and   
regular rhythm.  
Heart sounds: Normal heart   
sounds.  
Pulmonary:  
Effort: Pulmonary effort is   
normal. No tachypnea, accessory   
muscle usage or respiratory   
distress.  
Breath sounds: Normal breath   
sounds. No stridor. No wheezing,   
rhonchi or rales.  
Abdominal:  
General: There is no distension.  
Palpations: Abdomen is soft.  
Tenderness: There is no   
abdominal tenderness. There is   
no guarding or rebound.  
Musculoskeletal:  
Cervical back: Normal range of   
motion and neck supple. No   
edema, erythema, rigidity or   
tenderness. No pain with   
movement. Normal range of   
motion.  
Lymphadenopathy:  
Cervical: No cervical   
adenopathy.  
Skin:  
General: Skin is warm and dry.  
Neurological:  
Mental Status: She is alert and   
oriented to person, place, and   
time.  
  
ASSESSMENT/PLAN:  
1. Pharyngitis, unspecified   
etiology - ICD9: 462, ICD10:   
J02.9 (primary diagnosis)  
  
2. Viral illness - ICD9: 079.99,   
ICD10: B34.9  
  
Strep test negative. Diagnosed   
with viral illness. No evidence   
of bacterial infection. Cough   
suppressant sent to pharmacy.   
Patient was educated on   
supportive therapies. Patient   
will follow up with primary care   
provider as needed. Patient was   
instructed to immediately   
proceed to emergency room for   
any new, worsening, or symptoms   
lasting longer than anticipated.   
The patient's clinical   
presentation is otherwise   
unremarkable at this time. Based   
on exam and clinical finding,   
the patient is stable for   
discharge. Plan of care was   
discussed with patient. Patient   
verbalizes understanding and   
agrees to plan of care. This   
note was generated using Dragon   
software. It may contain errors   
in wording, punctuation, or   
spelling.  
  
BOLIVAR Farr.CNP  
  
  
Electronically signed by   
Alfonso Valles APRN.CNP at   
2024 7:44 PM EST  
documented in this encounter            [facility]  
   
                                                    2024 Miscellaneous   
Notes                                   Formatting of this note might be   
different from the original.  
Patient made aware of message.  
Aware of plan of care.  
No other questions.  
Encounter closed.  
  
Electronically signed by Susan Claudio RN at 2024 4:05   
PM EST  
Formatting of this note might be   
different from the original.  
Sara pended script with added   
notes.  
We do not have the Adult pull up   
in epic.  
  
Electronically signed by Susan Claudio RN at 2024 9:41   
AM EST  
Formatting of this note might be   
different from the original.  
Called this morning regarding   
the recent prescription that was   
sent in  
Needs to the prescription   
changed to  Adult Pull Up's   
Per patient   Adult diapers,   
they will continue issuing the   
ones with the tabs   
Please contact patient, Frankie   
can be reached at [phone]  
Electronically signed by Tommie Nogueira at 2024 8:11 AM   
EST  
documented in this encounter            [facility]  
   
                                        2024 Note     HNO ID: [id number]  
Author: SARA ASTORGA APRN.CNP  
Service: ?  
Author Type: Nurse Practitioner  
Type: Progress Notes  
Filed: 2024 16:52  
Note Text:  
Frankie Mckeon is a 33 year old   
female who presents today for a   
follow up for  
Patient presents with:  
Established Patient: Follow   
up/nocturia  
CHIEF COMPLAINT AND HISTORY OF   
PRESENT ILLNESS  
CC: follow up, refill  
33 year old female with DM, OAB   
and nocturnal enuresis she has   
been taking  
trospium 20 mg po bid with good   
results, no longer taking DDVAP   
and staying dry  
most night, she is still wearing   
pads at night PRN.  
Denies recent uti, gross   
hematuria  
Failed ditropan XL 10 mg  
No interim health changes  
DTF:every 2 hours  
NTF:0  
URGENCY:rare  
UUI:no  
BIJAN:at times  
STRAINING:no  
COMPLETE EMPTYING:yes  
PADS PER DAY: 5 diapers at   
night, not wet at this time  
FLUID INTAKE:  
sweet tea  
water  
pop  
VITALS: Height 166.4 cm (5'   
5.5 ), weight 84.8 kg (187 lb).  
ALLERGIES: Ambien [Zolpidem],   
Latex, and Sulfa (Sulfonamide   
Antibiotics)  
MEDICATIONS:  
Current Outpatient Medications  
Medication Sig Dispense Refill  
trospium (SANCTURA) 20 mg tablet   
Take 1 tablet by mouth twice   
daily. 60 tablet  
11  
Diaper,Brief, Adult,Disposable   
(DISPOSABLE BRIEF) 1 Each daily   
at bedtime. 90  
Each 3  
metFORMIN ER (GLUCOPHAGE XR) 500   
mg 24 hr tablet Take 2 tablets   
by mouth twice  
daily. 360 tablet 1  
glipiZIDE (GLUCOTROL XL) 10mg 24   
hr tablet Take 1 tablet by mouth   
once daily.  
90 tablet 1  
desmopressin acetate (DDAVP) 0.2   
mg tablet Take 1 tablet by mouth   
once daily.  
(Patient not taking: Reported on   
3/6/2023) 30 tablet 11  
empagliflozin (JARDIANCE) 10 mg   
tablet Take 1 tablet by mouth   
daily with  
breakfast. (Patient not taking:   
No sig reported) 30 tablet 2  
propranolol (INDERAL) 20 mg   
tablet Take 1 tablet by mouth   
twice daily. 180  
tablet 1  
folic acid 1 mg tablet Take 2   
tablets by mouth once daily.   
(Patient not taking:  
Reported on 2023) 60 tablet   
1  
SUMAtriptan (IMITREX) 100 mg   
tablet Take 1 tablet by mouth as   
needed. (Patient  
not taking: Reported on   
2023) 9 tablet 2  
sertraline (ZOLOFT) 50 mg tablet   
Take 1 tablet by mouth once   
daily. (Patient  
not taking: Reported on   
2023) 90 tablet 1  
albuterol HFA (VENTOLIN HFA) 90   
mcg/actuation inhaler Inhale 2   
Puffs as  
instructed every 4 hours as   
needed for Wheezing/Shortness of   
Breath. (Patient  
not taking: Reported on   
3/6/2023) 1 Inhaler 2  
blood sugar diagnostic (BLOOD   
GLUCOSE TEST) test strip Test   
blood sugar(s) 1-2  
times daily. Dx: Type 2 DM -   
Controlled E11.9 Insulin: No   
(Patient not taking:  
Reported on 2023) 100 Strip   
11  
L.acid/L.casei/B.bif/B.melinda/FOS   
(PROBIOTIC BLEND ORAL) Take by   
mouth. (Patient  
not taking: Reported on   
2023)  
prenatal 25/iron fum/folic/dha   
(PRENATAL-1 ORAL) Take by mouth.   
(Patient not  
taking: Reported on 2023)  
lamoTRIgine (LAMICTAL) 100 mg   
tablet Take 1 tablet by mouth   
once daily.  
(Patient not taking: Reported on   
2023) 30 tablet 2  
acetaminophen (TYLENOL) 500 mg   
tablet Take 500 mg by mouth   
every 8 hours as  
needed. (Patient not taking:   
Reported on 3/6/2023)  
No current facility-administered   
medications for this visit.  
SOCIAL HISTORY:  
Social History  
Tobacco Use  
Smoking status: Every Day  
Packs/day: 0.50  
Years: 10.00  
Additional pack years: 0.00  
Total pack years: 5.00  
Types: Cigarettes  
Last attempt to quit: 2009  
Years since quittin.5  
Smokeless tobacco: Never  
Tobacco comments:  
5-7 ciggs/day  
Vaping Use  
Vaping Use: Never used  
Substance Use Topics  
Alcohol use: Yes  
Comment: rarely  
Drug use: No  
PAST MEDICAL HISTORY:  
PAST MEDICAL HISTORY  
Diagnosis Date  
Anemia  
Anxiety  
Autism spectrum  
Bipolar I disorder, most recent   
episode (or current) manic, mild   
(HCC)  
Diabetes type 2, controlled   
(HCC)  
Fragile x chromosome  
Herpes labialis  
Oral herpes  
History of epilepsy  
History of migraine  
History of tobacco use  
Other and unspecified ovarian   
cyst  
Ovarian cyst  
PTSD (post-traumatic stress   
disorder)  
Unspecified , without   
mention of complication,   
unspecified  
6 Miscarriages  
Unspecified asthma(493.90)  
Urge incontinence  
Korey Saleem  
PAST SURGICAL HISTORY:  
PAST SURGICAL HISTORY  
Procedure Laterality Date  
NONE  
FAMILY HISTORY:  
FAMILY HISTORY  
Adopted: Yes  
Problem Relation Age of Onset  
Heart Mother  
None Father  
UNKNOWN  
Colon Cancer Other  
unsure who  
Cancer Other  
lung, liver (unsure who was who)  
All histories reviewed on this   
date 2024: Yes  
REVIEW OF SYSTEMS:  
CONSTITUTIONAL: Patient reports   
no recent fever or weight loss  
RESPIRATORY: Negative for cough,   
hemoptysis, wheezing, COPD,   
dyspnea or  
shortness of breath  
GI: No nausea, vomiting, or   
diarrhea  
MUSCULOSKELETAL: denies back   
pain or muscular weakness  
All other systems reviewed and   
are negative other than HPI.  
PHYSICAL EXAM:  
constitutional: appears healthy   
in no acute distress  
respiratory: normal respiratory   
motion  
Neuro: Gait normal. Se (more   
content not included)...                [facility] [place]  
   
                                        2023 Note     Patient Outreach (MAY QUEVEDOO)  
--------------------------------  
--------------------------------  
----------------  
FRANKIE MCKEON ([id number])   
1990 F  
Date Time Provider Department  
23 ROSSI NATION  
During your visit today, we   
recorded the following   
information about you:  
Rossi Nation RPh 2023   
9:44 AM Signed  
Primary Care Pharmacy Panel   
Management  
This patient has been identified   
through panel management efforts   
by the  
primary care pharmacy team.  
Please contact patient and   
schedule a pharmacy phone or   
virtual visit for  
diabetes management. Please use   
New Pharmacy, New Pharmacy Phone   
call, or Video  
Primary New visit types.  
Rossi Nation [facility]  
Allergies As of Date: 2023   
Noted Allergy Reaction  
AMBIEN (ZOLPIDEM) 2008 1 -   
Mental Status Change  
Comments: Hallucinations   
(visual)  
LATEX 2009 9 - Itching  
SULFA (SULFONAMIDE ANTIBIOTICS)   
2008 4 - Hives  
Date Reviewed: 2023  
Reviewed by: Gabrielle Ferris MA - Fully Assessed  
Primary Visit Diagnosis:Type 2   
diabetes (HCC) [E11.9]  
Order(s):CONSULT TO PHARMACY   
[[id number]] Order #: [id number]:   
1  
Prescriptions as of 2023  
- desmopressin acetate (DDAVP)   
0.2 mg tablet  
Take 1 tablet by mouth once   
daily.  
- trospium (SANCTURA) 20 mg   
tablet  
Take 1 tablet by mouth twice   
daily.  
- Diaper,Brief, Adult,Disposable   
(DISPOSABLE BRIEF)  
1 Each daily at bedtime.  
- metFORMIN ER (GLUCOPHAGE XR)   
500 mg 24 hr tablet  
Take 2 tablets by mouth twice   
daily.  
- glipiZIDE (GLUCOTROL XL) 10mg   
24 hr tablet  
Take 1 tablet by mouth once   
daily.  
- empagliflozin (JARDIANCE) 10   
mg tablet  
Take 1 tablet by mouth daily   
with breakfast.  
- propranolol (INDERAL) 20 mg   
tablet  
Take 1 tablet by mouth twice   
daily.  
- folic acid 1 mg tablet  
Take 2 tablets by mouth once   
daily.  
- SUMAtriptan (IMITREX) 100 mg   
tablet  
Take 1 tablet by mouth as   
needed.  
- sertraline (ZOLOFT) 50 mg   
tablet  
Take 1 tablet by mouth once   
daily.  
- albuterol HFA (VENTOLIN HFA)   
90 mcg/actuation inhaler  
Inhale 2 Puffs as instructed   
every 4 hours as needed for   
Wheezing/Shortness  
of Breath.  
- blood sugar diagnostic (BLOOD   
GLUCOSE TEST) test strip  
Test blood sugar(s) 1-2 times   
daily. Dx: Type 2 DM -   
Controlled E11.9  
Insulin: No  
- L.acid/L.casei/B.bif/B.melinda/FOS   
(PROBIOTIC BLEND ORAL)  
Take by mouth.  
- prenatal 25/iron fum/folic/dha   
(PRENATAL-1 ORAL)  
Take by mouth.  
- lamoTRIgine (LAMICTAL) 100 mg   
tablet  
Take 1 tablet by mouth once   
daily.  
- acetaminophen (TYLENOL) 500 mg   
tablet  
Take 500 mg by mouth every 8   
hours as needed.  
Problem List As Of Date   
2023 Noted Resolved  
Anemia, unspecified [D64.9]   
2008  
Bipolar Disorder, Unspecified   
[F31.9] 2008  
Attention Deficit Disorder   
without Mention of H*2008  
Mild intermittent asthma,   
uncomplicated [J45.20]2008  
Migraine without aura and   
without status migrai*2008  
Continuous tobacco abuse [Z72.0]   
2008  
Routine Gynecological   
Examination [Z01.419] 05/15/2009  
Class: Chronic  
Other Enthesopathy of Ankle and   
Tarsus [M77.50] 2009  
Abnormality of Gait [R26.9]   
2009  
Nocturnal Enuresis [N39.44]   
2009  
Urge Incontinence [N39.41]   
2009  
History of urinary incontinence   
[Z87.898] 2018  
Diabetes type 2, controlled   
(HCC) [E11.9]  
Bipolar I disorder, most recent   
episode (or cur*  
Herpes labialis [B00.1]  
Autism spectrum [F84.0]  
Anxiety [F41.9]  
Fragile x chromosome [Q99.2]  
History of migraine [Z86.69]  
Asthmatic bronchitis [J45.909]   
2018  
Anemia [D64.9]  
Tobacco use [Z72.0] 2020  
Obesity, Class I, BMI 30-34.9   
[E66.9] 2018  
Psychogenic nonepileptic seizure   
[F44.5] 2019  
Uncontrolled type 2 diabetes   
mellitus with hype*2022  
Encounter Number: [id number]  
Encounter Status:Closed by   
ROSSI NATION on 23                [facility] [place]  
   
                                        2023 Note     HNO ID: [id number]  
Author: Rossi Nation RPh  
Service: ?  
Author Type: Pharmacist  
Type: Progress Notes  
Filed: 2023 9:44 AM  
Note Text:  
Primary Care Pharmacy Panel   
Management  
This patient has been identified   
through panel management efforts   
by the  
primary care pharmacy team.  
Please contact patient and   
schedule a pharmacy phone or   
virtual visit for  
diabetes management. Please use   
New Pharmacy, New Pharmacy Phone   
call, or  
Video Primary New visit types.  
Rossi Nation, [facility]  
   
                                        2023 Note     HNO ID: [id number]  
Author: Alfonso Valles APRN.CNP  
Service: ?  
Author Type: Nurse Practitioner  
Type: Progress Notes  
Filed: 2023 6:57 PM  
Note Text:  
Subjective  
HPI  
Nontoxic-appearing female   
presents urgent care chief   
complaint headache.  
Patient states she had a   
headache this morning and was   
unable to go to  
work. Presents today for   
evaluation. Also does want her   
blood sugar  
checked. States she has not   
taken her diabetes medications   
for the last 2  
weeks. Has not checked her sugar   
in over a month. Presents today   
for  
evaluation. States headache is   
2-3 out of 10 currently. Has not   
taken  
any medications for it. Overall   
feels well. Denies any fever   
body aches  
chills productive cough chest   
pain shortness of breath   
pleuritic pain  
hemoptysis nausea vomiting   
abdominal pain change in bowel   
or bladder  
habits. Past medical history   
prescription medication use and   
allergies  
reviewed.  
/104   Pulse 102   Temp   
36.7 ?C (98 ?F)   Resp 18   Wt   
83.7 kg  
(184 lb 9.6 oz)   LMP (LMP   
Unknown)   SpO2 97%   BMI 32.70   
kg/m?  
Hr 89  
.Patient presents with:  
Headache: Elevated glucose   
readings  
PAST MEDICAL HISTORY  
Diagnosis Date  
Anemia  
Anxiety  
Autism spectrum  
Bipolar I disorder, most recent   
episode (or current) manic, mild   
(HCC)  
Diabetes type 2, controlled   
(HCC)  
Fragile x chromosome  
Herpes labialis  
Oral herpes  
History of epilepsy  
History of migraine  
History of tobacco use  
Other and unspecified ovarian   
cyst  
Ovarian cyst  
PTSD (post-traumatic stress   
disorder)  
Unspecified , without   
mention of complication,   
unspecified  
6 Miscarriages  
Unspecified asthma(493.90)  
Urge incontinence  
Korey Saleem  
PAST SURGICAL HISTORY  
Procedure Laterality Date  
NONE  
ALLERGIES Ambien [Zolpidem],   
Latex, and Sulfa (Sulfonamide   
Antibiotics)  
MEDICATIONS  
desmopressin acetate (DDAVP) 0.2   
mg tablet Take 1 tablet by mouth   
once  
daily. (Patient not taking:   
Reported on 3/6/2023)  
trospium (SANCTURA) 20 mg tablet   
Take 1 tablet by mouth twice   
daily.  
Diaper,Brief, Adult,Disposable   
(DISPOSABLE BRIEF) 1 Each daily   
at  
bedtime.  
metFORMIN ER (GLUCOPHAGE XR) 500   
mg 24 hr tablet Take 2 tablets   
by mouth  
twice daily. (Patient not   
taking: Reported on 3/6/2023)  
glipiZIDE (GLUCOTROL XL) 10mg 24   
hr tablet Take 1 tablet by mouth   
once  
daily. (Patient not taking:   
Reported on 3/6/2023)  
empagliflozin (JARDIANCE) 10 mg   
tablet Take 1 tablet by mouth   
daily with  
breakfast. (Patient not taking:   
No sig reported)  
propranolol (INDERAL) 20 mg   
tablet Take 1 tablet by mouth   
twice daily.  
folic acid 1 mg tablet Take 2   
tablets by mouth once daily.   
(Patient not  
taking: No sig reported)  
SUMAtriptan (IMITREX) 100 mg   
tablet Take 1 tablet by mouth as   
needed.  
(Patient not taking: No sig   
reported)  
sertraline (ZOLOFT) 50 mg tablet   
Take 1 tablet by mouth once   
daily.  
(Patient not taking: No sig   
reported)  
albuterol HFA (VENTOLIN HFA) 90   
mcg/actuation inhaler Inhale 2   
Puffs as  
instructed every 4 hours as   
needed for Wheezing/Shortness of   
Breath.  
(Patient not taking: Reported on   
3/6/2023)  
blood sugar diagnostic (BLOOD   
GLUCOSE TEST) test strip Test   
blood  
sugar(s) 1-2 times daily. Dx:   
Type 2 DM - Controlled E11.9   
Insulin: No  
(Patient not taking: No sig   
reported)  
L.acid/L.casei/B.bif/B.melinda/FOS   
(PROBIOTIC BLEND ORAL) Take by   
mouth.  
(Patient not taking: No sig   
reported)  
prenatal 25/iron fum/folic/dha   
(PRENATAL-1 ORAL) Take by mouth.   
(Patient  
not taking: No sig reported)  
lamoTRIgine (LAMICTAL) 100 mg   
tablet Take 1 tablet by mouth   
once daily.  
(Patient not taking: No sig   
reported)  
acetaminophen (TYLENOL) 500 mg   
tablet Take 500 mg by mouth   
every 8 hours  
as needed. (Patient not taking:   
Reported on 3/6/2023)  
FAMILY HISTORY  
Adopted: Yes  
Problem Relation Age of Onset  
Heart Mother  
None Father  
UNKNOWN  
Colon Cancer Other  
unsure who  
Cancer Other  
lung, liver (unsure who was who)  
Social History  
Tobacco Use  
Smoking status: Every Day  
Packs/day: 0.50  
Years: 10.00  
Additional pack years: 0.00  
Total pack years: 5.00  
Types: Cigarettes  
Last attempt to quit: 2009  
Years since quittin.3  
Smokeless tobacco: Never  
Tobacco comments:  
5-7 ciggs/day  
Vaping Use  
Vaping Use: Never used  
Substance Use Topics  
Alcohol use: Yes  
Comment: rarely  
Drug use: No  
Review of Systems  
Constitutional: Negative for   
chills, fever and   
malaise/fatigue.  
HENT: Negative for congestion,   
ear discharge, ear pain, sinus   
pain and  
sore throat.  
Eyes: Negative for blurred   
vision, pain, discharge and   
redness.  
Respiratory: Negative for cough,   
hemoptysis, sputum production,   
shortness  
of breath, wheezing and stridor.  
Cardiovascular: Negative for   
chest pain.  
Gastrointestinal: Negative for   
abdominal pain, diarrhea, nausea   
and  
vomiting.  
Genitourinary: Negative.  
Musculoskeletal: Negative for   
myalgias.  
Skin: Negative for itching and   
rash.  
Neurological: Positive for   
headaches. Negative for   
dizziness.  
Objective  
Physical Exam  
Co (more content not   
included)...                            [facility]  
   
                                                    2023 History of Presen  
t   
illness Narrative                       Formatting of this note is   
different from the original.  
Subjective  
HPI  
  
Nontoxic-appearing female   
presents urgent care chief   
complaint headache. Patient   
states she had a headache this   
morning and was unable to go to   
work. Presents today for   
evaluation. Also does want her   
blood sugar checked. States she   
has not taken her diabetes   
medications for the last 2   
weeks. Has not checked her sugar   
in over a month. Presents today   
for evaluation. States headache   
is 2-3 out of 10 currently. Has   
not taken any medications for   
it. Overall feels well. Denies   
any fever body aches chills   
productive cough chest pain   
shortness of breath pleuritic   
pain hemoptysis nausea vomiting   
abdominal pain change in bowel   
or bladder habits. Past medical   
history prescription medication   
use and allergies reviewed.  
  
/104   Pulse 102   Temp   
36.7 C (98 F)   Resp 18   Wt   
83.7 kg (184 lb 9.6 oz)   LMP   
(LMP Unknown)   SpO2 97%   BMI   
32.70 kg/m  
Hr 89  
.Patient presents with:  
Headache: Elevated glucose   
readings  
  
PAST MEDICAL HISTORY  
Diagnosis Date  
Anemia  
Anxiety  
Autism spectrum  
Bipolar I disorder, most recent   
episode (or current) manic, mild   
(HCC)  
Diabetes type 2, controlled   
(HCC)  
Fragile x chromosome  
Herpes labialis  
Oral herpes  
History of epilepsy  
History of migraine  
History of tobacco use  
Other and unspecified ovarian   
cyst  
Ovarian cyst  
PTSD (post-traumatic stress   
disorder)  
Unspecified , without   
mention of complication,   
unspecified  
6 Miscarriages  
Unspecified asthma(493.90)  
Urge incontinence  
Korey Saleem  
  
  
PAST SURGICAL HISTORY  
Procedure Laterality Date  
NONE  
  
ALLERGIES Ambien [Zolpidem],   
Latex, and Sulfa (Sulfonamide   
Antibiotics)  
  
MEDICATIONS  
desmopressin acetate (DDAVP) 0.2   
mg tablet Take 1 tablet by mouth   
once daily. (Patient not taking:   
Reported on 3/6/2023)  
trospium (SANCTURA) 20 mg tablet   
Take 1 tablet by mouth twice   
daily.  
Diaper,Brief, Adult,Disposable   
(DISPOSABLE BRIEF) 1 Each daily   
at bedtime.  
metFORMIN ER (GLUCOPHAGE XR) 500   
mg 24 hr tablet Take 2 tablets   
by mouth twice daily. (Patient   
not taking: Reported on   
3/6/2023)  
glipiZIDE (GLUCOTROL XL) 10mg 24   
hr tablet Take 1 tablet by mouth   
once daily. (Patient not taking:   
Reported on 3/6/2023)  
empagliflozin (JARDIANCE) 10 mg   
tablet Take 1 tablet by mouth   
daily with breakfast. (Patient   
not taking: No sig reported)  
propranolol (INDERAL) 20 mg   
tablet Take 1 tablet by mouth   
twice daily.  
folic acid 1 mg tablet Take 2   
tablets by mouth once daily.   
(Patient not taking: No sig   
reported)  
SUMAtriptan (IMITREX) 100 mg   
tablet Take 1 tablet by mouth as   
needed. (Patient not taking: No   
sig reported)  
sertraline (ZOLOFT) 50 mg tablet   
Take 1 tablet by mouth once   
daily. (Patient not taking: No   
sig reported)  
albuterol HFA (VENTOLIN HFA) 90   
mcg/actuation inhaler Inhale 2   
Puffs as instructed every 4   
hours as needed for   
Wheezing/Shortness of Breath.   
(Patient not taking: Reported on   
3/6/2023)  
blood sugar diagnostic (BLOOD   
GLUCOSE TEST) test strip Test   
blood sugar(s) 1-2 times daily.   
Dx: Type 2 DM - Controlled E11.9   
Insulin: No (Patient not taking:   
No sig reported)  
L.acid/L.casei/B.bif/B.melinda/FOS   
(PROBIOTIC BLEND ORAL) Take by   
mouth. (Patient not taking: No   
sig reported)  
prenatal 25/iron fum/folic/dha   
(PRENATAL-1 ORAL) Take by mouth.   
(Patient not taking: No sig   
reported)  
lamoTRIgine (LAMICTAL) 100 mg   
tablet Take 1 tablet by mouth   
once daily. (Patient not taking:   
No sig reported)  
acetaminophen (TYLENOL) 500 mg   
tablet Take 500 mg by mouth   
every 8 hours as needed.   
(Patient not taking: Reported on   
3/6/2023)  
  
FAMILY HISTORY  
Adopted: Yes  
Problem Relation Age of Onset  
Heart Mother  
None Father  
UNKNOWN  
Colon Cancer Other  
unsure who  
Cancer Other  
lung, liver (unsure who was who)  
  
Social History  
  
Tobacco Use  
Smoking status: Every Day  
Packs/day: 0.50  
Years: 10.00  
Additional pack years: 0.00  
Total pack years: 5.00  
Types: Cigarettes  
Last attempt to quit: 2009  
Years since quittin.3  
Smokeless tobacco: Never  
Tobacco comments:  
5-7 ciggs/day  
Vaping Use  
Vaping Use: Never used  
Substance Use Topics  
Alcohol use: Yes  
Comment: rarely  
Drug use: No  
  
Review of Systems  
Constitutional: Negative for   
chills, fever and   
malaise/fatigue.  
HENT: Negative for congestion,   
ear discharge, ear pain, sinus   
pain and sore throat.  
Eyes: Negative for blurred   
vision, pain, discharge and   
redness.  
Respiratory: Negative for cough,   
hemoptysis, sputum production,   
shortness of breath, wheezing   
and stridor.  
Cardiovascular: Negative for   
chest pain.  
Gastrointestinal: Negative for   
abdominal pain, diarrhea, nausea   
and vomiting.  
Genitourinary: Negative.  
Musculoskeletal: Negative for   
myalgias.  
Skin: Negative for itching and   
rash.  
Neurological: Positive for   
headaches. Negative for   
dizziness.  
  
  
  
Objective  
Physical Exam  
Constitutional:  
General: She is not in acute   
distress.  
Appearance: She is not   
diaphoretic.  
HENT:  
Head: Normocephalic.  
Jaw: No trismus, tenderness,   
swelling or pain on movement.  
Nose: Nose normal.  
Mouth/Throat:  
Mouth: Mucous membranes are   
moist.  
Pharynx: Oropharynx is clear.   
Uvula midline. No pharyngeal   
swelling, oropharyngeal exudate,   
posterior oropharyngeal erythema   
or uvula swelling.  
Eyes:  
Conjunctiva/sclera: Conjunctivae   
normal.  
Pupils: Pupils are equal, round,   
and reactive to light.  
Cardiovascular:  
Rate and Rhythm: Normal rate and   
regular rhythm.  
Heart sounds: Normal heart   
sounds.  
Pulmonary:  
Effort: Pulmonary effort is   
normal. No tachypnea, accessory   
muscle usage or respiratory   
distress.  
Breath sounds: Normal breath   
sounds. No stridor. No wheezing,   
rhonchi or rales.  
Abdominal:  
General: There is no distension.  
Palpations: Abdomen is soft.  
Tenderness: There is no   
abdominal tenderness. There is   
no guarding or rebound.  
Musculoskeletal:  
Cervical back: Normal range of   
motion and neck supple. No   
edema, erythema, rigidity or   
tenderness. No pain with   
movement. Normal range of   
motion.  
Lymphadenopathy:  
Cervical: No cervical   
adenopathy.  
Skin:  
General: Skin is warm and dry.  
Neurological:  
Mental Status: She is alert and   
oriented to person, place, and   
time.  
  
ASSESSMENT/PLAN:  
1. Headache, unspecified   
headache type - ICD9: 784.0,   
ICD10: R51.9  
  
- GLUCOSE, BLOOD (POC)  
  
Patient diagnosed with headache.   
Fingerstick of 250. Patient was   
educated on hyperglycemia. Was   
instructed to follow plan   
established by PCP. Encouraged   
on medication adherence and   
routine blood glucose   
monitoring. Patient was educated   
on supportive therapies. Patient   
will follow up with primary care   
provider as needed. Patient was   
instructed to immediately   
proceed to emergency room for   
any new, worsening, or symptoms   
lasting longer than anticipated.   
The patient's clinical   
presentation is otherwise   
unremarkable at this time. Based   
on exam and clinical finding,   
the patient is stable for   
discharge. Plan of care was   
discussed with patient. Patient   
verbalizes understanding and   
agrees to plan of care. This   
note was generated using Dragon   
software. It may contain errors   
in wording, punctuation, or   
spelling.  
  
BOLIVAR Farr.CNP  
  
  
Electronically signed by   
Alfonso Valles APRN.CNP at   
2023 6:57 PM EST  
documented in this encounter            [facility]  
   
                                                    2023 Miscellaneous   
Notes                                   Formatting of this note might be   
different from the original.  
See Message.  
Electronically signed by Susan Claudio RN at 2023 8:30 AM   
EDT  
documented in this encounter            [facility]  
   
                                                    2023 History of Presen  
t   
illness Narrative                       Formatting of this note is   
different from the original.  
This note was created using   
[employer].  
Subjective  
Frankie Mckeon is a 32 year old   
female.  
  
HPI 32-year-old female presents   
for vaginal itching, concern for   
yeast infection and right   
shoulder pain. Patient states   
that she started getting vaginal   
itching about 2 days ago. She   
states that she always has   
vaginal discharge, which is   
unchanged. No abnormal vaginal   
bleeding. She states that she is   
a diabetic and gets yeast   
infections intermittently. She   
states that she feels like she   
has a yeast infection and this   
feels similar to prior ones. She   
denies any concern for STD. She   
denies any urine symptoms. She   
denies any concern for   
pregnancy. States that she   
recently stopped taking all of   
her diabetic medications that   
she is going to a new doctor   
soon. She has been checking her   
sugars at home, which have been   
doing okay per the patient. She   
denies any abdominal pain,   
vomiting. No hematuria or   
dysuria. She is also complaining   
today of right shoulder pain   
that she has had for a year. She   
states that she fell in August   
onto her shoulder. No x-rays   
completed at that time. States   
that she has had intermittent   
pain in the right shoulder for   
the past year. She denies any   
new fall or injury. She has been   
taking Motrin and Aleve with   
some improvement.  
  
PAST MEDICAL HISTORY  
Diagnosis Date  
Anemia  
Anxiety  
Autism spectrum  
Bipolar I disorder, most recent   
episode (or current) manic, mild   
(HCC)  
Diabetes type 2, controlled   
(HCC)  
Fragile x chromosome  
Herpes labialis  
Oral herpes  
History of epilepsy  
History of migraine  
History of tobacco use  
Other and unspecified ovarian   
cyst  
Ovarian cyst  
PTSD (post-traumatic stress   
disorder)  
Unspecified , without   
mention of complication,   
unspecified  
6 Miscarriages  
Unspecified asthma(493.90)  
Urge incontinence  
Korey Saleem  
  
  
PAST SURGICAL HISTORY  
Procedure Laterality Date  
NONE  
  
ALLERGIES Ambien [Zolpidem],   
Latex, and Sulfa (Sulfonamide   
Antibiotics)  
  
MEDICATIONS  
trospium (SANCTURA) 20 mg tablet   
Take 1 tablet by mouth twice   
daily.  
Diaper,Brief, Adult,Disposable   
(DISPOSABLE BRIEF) 1 Each daily   
at bedtime.  
propranolol (INDERAL) 20 mg   
tablet Take 1 tablet by mouth   
twice daily.  
fluconazole (DIFLUCAN) 150 mg   
tablet Take 1 tablet by mouth   
once daily for 1 day.  
desmopressin acetate (DDAVP) 0.2   
mg tablet Take 1 tablet by mouth   
once daily. (Patient not taking:   
Reported on 3/6/2023)  
metFORMIN ER (GLUCOPHAGE XR) 500   
mg 24 hr tablet Take 2 tablets   
by mouth twice daily. (Patient   
not taking: Reported on   
3/6/2023)  
glipiZIDE (GLUCOTROL XL) 10mg 24   
hr tablet Take 1 tablet by mouth   
once daily. (Patient not taking:   
Reported on 3/6/2023)  
empagliflozin (JARDIANCE) 10 mg   
tablet Take 1 tablet by mouth   
daily with breakfast. (Patient   
not taking: No sig reported)  
folic acid 1 mg tablet Take 2   
tablets by mouth once daily.   
(Patient not taking: No sig   
reported)  
SUMAtriptan (IMITREX) 100 mg   
tablet Take 1 tablet by mouth as   
needed. (Patient not taking: No   
sig reported)  
sertraline (ZOLOFT) 50 mg tablet   
Take 1 tablet by mouth once   
daily. (Patient not taking: No   
sig reported)  
albuterol HFA (VENTOLIN HFA) 90   
mcg/actuation inhaler Inhale 2   
Puffs as instructed every 4   
hours as needed for   
Wheezing/Shortness of Breath.   
(Patient not taking: Reported on   
3/6/2023)  
blood sugar diagnostic (BLOOD   
GLUCOSE TEST) test strip Test   
blood sugar(s) 1-2 times daily.   
Dx: Type 2 DM - Controlled E11.9   
Insulin: No (Patient not taking:   
No sig reported)  
L.acid/L.casei/B.bif/B.melinda/FOS   
(PROBIOTIC BLEND ORAL) Take by   
mouth. (Patient not taking: No   
sig reported)  
prenatal 25/iron fum/folic/dha   
(PRENATAL-1 ORAL) Take by mouth.   
(Patient not taking: No sig   
reported)  
lamoTRIgine (LAMICTAL) 100 mg   
tablet Take 1 tablet by mouth   
once daily. (Patient not taking:   
No sig reported)  
acetaminophen (TYLENOL) 500 mg   
tablet Take 500 mg by mouth   
every 8 hours as needed.   
(Patient not taking: Reported on   
3/6/2023)  
  
FAMILY HISTORY  
Adopted: Yes  
Problem Relation Age of Onset  
Heart Mother  
None Father  
UNKNOWN  
Colon Cancer Other  
unsure who  
Cancer Other  
lung, liver (unsure who was who)  
  
Social History  
  
Tobacco Use  
Smoking status: Every Day  
Packs/day: 0.50  
Years: 10.00  
Pack years: 5.00  
Types: Cigarettes  
Last attempt to quit: 2009  
Years since quittin.6  
Smokeless tobacco: Never  
Tobacco comments:  
5-7 ciggs/day  
Vaping Use  
Vaping Use: Never used  
Substance Use Topics  
Alcohol use: Yes  
Comment: rarely  
Drug use: No  
  
Review of Systems  
Constitutional: Negative for   
chills and fever.  
HENT: Negative for congestion,   
ear pain and sore throat.  
Respiratory: Negative for cough   
and shortness of breath.  
Cardiovascular: Negative for   
chest pain.  
Gastrointestinal: Negative for   
diarrhea and vomiting.  
Genitourinary: Positive for   
vaginal discharge. Negative for   
pelvic pain and vaginal pain (+   
itching).  
Musculoskeletal: Positive for   
arthralgias (R shoulder).  
  
Objective  
/80   Pulse 108   Temp   
36.6 C (97.8 F)   Resp 16   Wt   
80.3 kg (177 lb)   LMP (LMP   
Unknown)   SpO2 97%   BMI 31.35   
kg/m  
Physical Exam  
Vitals and nursing note   
reviewed.  
Constitutional:  
General: She is not in acute   
distress.  
Appearance: Normal appearance.   
She is not toxic-appearing.  
HENT:  
Nose: Nose normal.  
Mouth/Throat:  
Mouth: Mucous membranes are   
moist.  
Eyes:  
Conjunctiva/sclera: Conjunctivae   
normal.  
Cardiovascular:  
Rate and Rhythm: Normal rate and   
regular rhythm.  
Pulmonary:  
Effort: Pulmonary effort is   
normal.  
Breath sounds: Normal breath   
sounds.  
Abdominal:  
General: Abdomen is flat.  
Palpations: Abdomen is soft.  
Tenderness: There is no   
abdominal tenderness. There is   
no right CVA tenderness or left   
CVA tenderness.  
Genitourinary:  
Comments: Deferred exam  
Musculoskeletal:  
Right shoulder: Tenderness   
present. No swelling, deformity   
or bony tenderness. Normal range   
of motion. Normal strength.   
Normal pulse.  
Comments: Mild tenderness over   
right shoulder. Normal ROM right   
shoulder. Normal sensation and   
strength right upper extremity.  
Skin:  
General: Skin is warm and dry.  
Neurological:  
Mental Status: She is alert.  
  
Assessment and Plan  
  
ASSESSMENT/PLAN:  
1. Vaginal itching - ICD9:   
698.1, ICD10: N89.8 (primary   
diagnosis)  
-Patient deferred vaginal exam.   
She states she has history of   
yeast infections and this feels   
similar.  
-Rx for Diflucan.  
-No concern for STD or   
pregnancy.  
  
2. Chronic right shoulder pain -   
ICD9: 719.41, 338.29, ICD10:   
M25.511, G89.29  
-Discussed getting XR although I   
do not believe it would show us   
much at this point as patient   
had an injury over a year ago.   
She has chronic right shoulder   
pain. No acute injury. Low   
suspicion for fracture.  
-Possible arthritis or   
ligamentous. Recommend follow-up   
with PCP to discuss possible   
physical therapy.  
-In the meantime, continue   
NSAIDs, ice.  
  
-I did  the patient on   
taking her diabetic medications   
as prescribed. She states she   
has an appointment with PCP   
coming up. Recommend she discuss   
all of her medications with   
them.  
  
Diagnosis and treatment plan   
were discussed and questions   
were answered to the patient's   
satisfaction. Pt acknowledged   
understanding of concepts and   
follow up plan. Specific signs   
and symptoms that would indicate   
the need for higher level of   
care were discussed in detail   
warranting prompt ER evaluation.  
  
ZARIA Langston  
  
  
Electronically signed by ZARIA Langston at 2023 6:45   
PM EST  
documented in this encounter            [facility]  
   
                                                    2023 Miscellaneous   
Notes                                   Formatting of this note might be   
different from the original.  
Patient called. Verified name   
and date of birth. Informed of   
message sent in [employer] and   
proved information for [employer]. Patient is searching   
for affordable pull-ups at this   
point since insurance has denied   
coverage. Martine Turcios LPN  
  
Electronically signed by   
Martine Turcios LPN at 2023   
10:43 AM EST  
Formatting of this note might be   
different from the original.  
Called patient. No answer- left   
message and informed sending   
detailed message to [employer].   
Martine Turcios LPN  
  
Electronically signed by   
Martine Turcios LPN at 2023   
10:32 AM EST  
Formatting of this note might be   
different from the original.  
Pt called in states the   
disposable diapers have been   
denied by insurance. Pt would   
like to know what your   
recommendation is. Has not   
scheduled with female urology.   
Given number to Dr. Bustamante's   
office to schedule. Please   
advise.  
  
Melinda Paniagua RN  
  
Electronically signed by Melinda Paniagua RN at 2023 8:32   
AM EST  
documented in this encounter            [facility]  
   
                                        2023 Instructions   
  
  
Korey Saleem PA-C -   
2023 5:30 PM ESTFormatting   
of this note might be different   
from the original.  
Refer to [facility] for Female   
Urology  
Electronically signed by Korey Saleem PA-C at 2023 5:30   
PM EST  
  
documented in this encounter            [facility]  
   
                                                    2023 History of Presen  
t   
illness Narrative                       Formatting of this note might be   
different from the original.  
Images from the original note   
were not included.  
  
[facility]   
[place]  
  
Some elements copied from his   
previous note, which have been   
updated where appropriate, and   
all reflect current medical   
decision making from date of   
this visit.  
  
PATIENT INFO: Frankie Mckeon 32   
year old  
  
PCP: Dru Lam MD  
  
CHIEF COMPLAINT:  
Urinary Frequency, Urgency and   
Incontinence  
  
HPI:  
This is a 32 year old Diabetic   
female, who has had Urinary   
Frequency, Urgency and   
Incontinence following up now   
due to Dr. Rodriguez no longer at   
[facility] and needs a Female Urology   
specialist, and has   
transportation complications   
getting to [place] she   
continues to have stress   
incontinence and enuresis, she   
continues to be distressed   
because of having to replace   
several mattresses in 1 year due   
to incontinence , medications   
are only mildly helpful. She is   
also taking Jardiance for DM-2   
which she get glycosuria  
And this is a big contributor to   
the urgency, and have aske her   
to discuss this with PCP.  
I will try to get her to see Dr. Bustamante [facility]  
  
VOIDING SYMPTOMS:  
Incontinence: Yes At night   
without any warning or waking   
her up  
  
REVIEW OF SYSTEMS:  
General: General: Well   
developed, well nourished. No   
acute distress  
Endocrine: DM II - on Glucophage  
  
PHYSICAL EXAMINATION:  
General Appearance/   
Constitutional: Well developed,   
well nourished, and in no   
apparent distress  
  
IMPRESSION / PLAN:  
> History of OAB with   
Incontinence  
> [facility] refilled  
> Glycosuria d/t Jardiance  
> Consult with Dr Kieran Bustamante for   
recommendations on her   
situations  
  
DIEGO Aguilar MT, PA-C  
Electronically signed by Korey Saleem PA-C at 2023 1:02   
PM EST  
Formatting of this note might be   
different from the original.  
Verified name and date of birth.  
  
CC Post Void Residual  
  
HPI:  
Frankie Mckeon is a 32 year old   
female.  
The patient is here now for an   
appointment with DIEGO Aguilar MT, PA-COV.  
  
Procedure:  
Explained procedure to patient   
and verbalizes understanding.   
Performed a PVR.  
Patient urinated and instructed   
to empty bladder as much as   
possible just prior to having   
PVR done using bladder   
ultrasound scanner.  
Results of scan: 0 mL  
  
The patient tolerated the   
procedure well.  
  
Plan:  
Appointment with Korey.  
  
  
Electronically signed by   
Martine Turcios LPN at 2023   
1:02 PM EST  
documented in this encounter            [facility]  
   
                                                    2023 History of Presen  
t   
illness Narrative                       Formatting of this note might be   
different from the original.  
Infectious Disease E-Consult   
Response  
  
In response to your eConsult   
Infectious Disease request for   
Frankie Mckeon regard[age] year old female patient,   
Frankie Mckeon who is being   
treated for urine culture   
results .Mixed microbiota,   
including predominantly:  
  
10,000 -<50,000 CFU/ml Candida   
glabrata (Nakaseomyces glabrata)   
Abnormal  
  
  
My clinical question: was   
treated with 2 diflucan 150mg if   
this effective?  
  
Please assess and respond with   
your recommendations regarding:   
treatment of urine culture.  
  
Provided by: BOLIVAR Durand.[facility]  
Location: [address] [place] [address]  
Dept: [phone]  
  
History of present illness   
provided through requesting   
provider documentation and   
current treatment plan was   
reviewed.  
UTI  
Poor control DM  
NO WBC on UA dip, but mixed Cx   
including C. glabrat  
Hx allergy to Sulfa  
  
Based on the patient history   
provided, my impression is as   
follows:  
First of all always good to send   
urinalysis when sending urine   
culture. If no white cells in   
the urinalysis then positive   
culture indicates either   
bacteriuria or candiduria alone   
and does not need to be treated.   
I noticed her UA dip is   
leukocyte negative  
  
If the patient has symptoms then   
treatment of a positive urine   
culture is reasonable  
If the symptoms have any concern   
for upper tract disease (fever   
or flank pain) then getting an   
ultrasound of the kidneys and   
bladder is very reasonable to   
make sure that stones were   
anatomic issues are not involved   
in the process  
  
Candida glabrata is more   
relatively resistant to   
fluconazole than Candida   
albicans. If this patient's   
symptoms have completely   
resolved then I would recommend   
no more fluconazole for   
treatment. If the patient still   
has symptoms I would recommend   
higher dose of fluconazole. It   
appears the patient has poorly   
controlled diabetes and last CMP   
was 2022, hence make sure   
she follows through with the   
complete metabolic profile that   
was ordered the other day since   
fluconazole is dosed based on   
renal function.  
If the renal function is normal   
a treatment plan for Candida   
glabrata that is likely to be   
more successful when it is noted   
to be   
 susceptible-dose-dependent    
would be to give fluconazole 800   
mg on day 1 followed by 400 mg   
on days 2 through 7. If her   
creatinine was 2.0 or above then   
I would cut the recommended   
doses in half  
  
In light of previous high   
hemoglobin A1c/poorly controlled   
diabetes I think a low threshold   
for kidney bladder ultrasound is   
also reasonable  
  
Specialist appointment needs: No   
appointment necessary in ID at   
this time  
  
Michael Holley MD  
2023  
  
  
Electronically signed by Michael Holley MD at 2023   
3:48 PM EST  
documented in this encounter            [facility]  
   
                                                    2023 Miscellaneous   
Notes                                   Formatting of this note might be   
different from the original.  
Pt notified of results &   
verbalized understanding. Chandni Paez LPN  
  
Electronically signed by Chandni Paez LPN at 2023 12:05   
PM EST  
Formatting of this note might be   
different from the original.  
Please call patient and let her   
know her urine culture did not   
show significant bacterial   
infection in the urine, she did   
have yeast secondarily present.   
Diflucan was called in.  
Electronically signed by Elina Ulrich PA-C at 2023 10:08   
AM EST  
documented in this encounter            [facility]  
   
                                                    01- Miscellaneous   
Notes                                   Formatting of this note might be   
different from the original.  
Phone call placed spoke to Ray   
at [facility] Client   
Services [phone] option 1,   
added request sensitivity to   
urine 01/10/2023.  
  
Marisa Oliveira LPN  
  
Electronically signed by Marisa Oliveira LPN at 01/10/2023 3:41 PM   
EST  
Formatting of this note might be   
different from the original.  
Hi nursing,  
Can we please reach out and ask   
the lab to perform sensitivity.  
Thanks  
Electronically signed by Ileana Worrell APRN.CNP at 01/10/2023   
3:03 PM EST  
documented in this encounter            [facility]  
   
                                                    2023 History of Presen  
t   
illness Narrative                       Formatting of this note is   
different from the original.  
CC: Patient presents with:  
UTI: Urgency, frequncy  
  
Patient was instructed that we   
should check her blood sugar due   
to the amount of glucose that   
was in her urine. Patient says   
she knows she has uncontrolled   
diabetes and never checks her   
sugar. Patient does not want her   
blood sugar checked at all at   
this time because she knows it   
will be high. Patient states she   
is aware of the risk factors and   
she is okay with that. Patient   
does not want to go to the ER.   
Patient does not want to know if   
her blood sugar is high. Patient   
just wants to know if she has a   
UTI. Patient's only symptom is   
urinary frequency. Denies all   
other symptoms.  
  
HPI  
Frankie Mckeon is a 32 year old   
female who presents with   
complaint of possible UTI. These   
symptoms have been present for 7   
days.  
Associated symptoms: frequency  
Denies: burning, urgency,   
backpain, hematuria, pressure,   
fever, chills, sweats, abdominal   
pain, and flank pain  
Treatments: nothing  
  
The ROS was otherwise negative.  
  
PMH, Medications, labs,   
allergies, and recent past   
visits with PCP were reviewed   
and updated as able.  
  
PHYSICAL EXAM:  
/70   Pulse 113   Temp   
36.7 C (98 F)   Resp 20   Wt   
85.3 kg (188 lb)   LMP   
05/15/2022 (Approximate)   SpO2   
98%   BMI 32.78 kg/m  
General: Well appearing and   
alert  
  
PAST MEDICAL HISTORY  
Diagnosis Date  
Anemia  
Anxiety  
Autism spectrum  
Bipolar I disorder, most recent   
episode (or current) manic, mild   
(HCC)  
Diabetes type 2, controlled   
(HCC)  
Fragile x chromosome  
Herpes labialis  
Oral herpes  
History of epilepsy  
History of migraine  
History of tobacco use  
Other and unspecified ovarian   
cyst  
Ovarian cyst  
PTSD (post-traumatic stress   
disorder)  
Unspecified , without   
mention of complication,   
unspecified  
6 Miscarriages  
Unspecified asthma(493.90)  
Urge incontinence  
Korey Saleem  
  
  
PAST SURGICAL HISTORY  
Procedure Laterality Date  
NONE  
  
ALLERGIES Ambien [Zolpidem],   
Latex, and Sulfa (Sulfonamide   
Antibiotics)  
  
MEDICATIONS  
desmopressin acetate (DDAVP) 0.2   
mg tablet Take 1 tablet by mouth   
once daily.  
oxybutynin ER (DITROPAN XL) 10   
mg 24 hr tablet Take 1 tablet by   
mouth once daily.  
empagliflozin (JARDIANCE) 10 mg   
tablet Take 1 tablet by mouth   
daily with breakfast.  
folic acid 1 mg tablet Take 2   
tablets by mouth once daily.  
SUMAtriptan (IMITREX) 100 mg   
tablet Take 1 tablet by mouth as   
needed.  
sertraline (ZOLOFT) 50 mg tablet   
Take 1 tablet by mouth once   
daily.  
albuterol HFA (VENTOLIN HFA) 90   
mcg/actuation inhaler Inhale 2   
Puffs as instructed every 4   
hours as needed for   
Wheezing/Shortness of Breath.  
blood sugar diagnostic (BLOOD   
GLUCOSE TEST) test strip Test   
blood sugar(s) 1-2 times daily.   
Dx: Type 2 DM - Controlled E11.9   
Insulin: No  
L.acid/L.casei/B.bif/B.melinda/FOS   
(PROBIOTIC BLEND ORAL) Take by   
mouth.  
prenatal 25/iron fum/folic/dha   
(PRENATAL-1 ORAL) Take by mouth.  
lamoTRIgine (LAMICTAL) 100 mg   
tablet Take 1 tablet by mouth   
once daily.  
Diaper,Brief, Adult,Disposable   
(ADULT BRIEFS - MEDIUM) misc 1   
application once daily. Patient   
needs pull-up type, no tabs  
1 Application once daily  
acetaminophen (TYLENOL) 500 mg   
tablet Take 500 mg by mouth   
every 8 hours as needed.  
metFORMIN ER (GLUCOPHAGE XR) 500   
mg 24 hr tablet Take 2 tablets   
by mouth twice daily.  
glipiZIDE (GLUCOTROL XL) 10mg 24   
hr tablet Take 1 tablet by mouth   
once daily.  
propranolol (INDERAL) 20 mg   
tablet Take 1 tablet by mouth   
twice daily.  
  
FAMILY HISTORY  
Adopted: Yes  
Problem Relation Age of Onset  
Heart Mother  
None Father  
UNKNOWN  
Colon Cancer Other  
unsure who  
Cancer Other  
lung, liver (unsure who was who)  
  
Social History  
  
Tobacco Use  
Smoking status: Every Day  
Packs/day: 0.50  
Years: 10.00  
Pack years: 5.00  
Types: Cigarettes  
Last attempt to quit: 2009  
Years since quittin.5  
Smokeless tobacco: Never  
Tobacco comments:  
5-7 ciggs/day  
Vaping Use  
Vaping Use: Never used  
Substance Use Topics  
Alcohol use: Yes  
Comment: rarely  
Drug use: No  
  
ASSESSMENT/PLAN:  
1. Urgency of urination - ICD9:   
788.63, ICD10: R39.15  
  
- UA DIP, URINE (POC)  
- URINE CULTURE  
- GLUCOSE, BLOOD (POC)patient   
refused  
  
Patient was not treated at this   
time due to lack of evidence for   
urinary tract infection. Culture   
was sent so please treat if that   
is positive.  
  
Potential red flag symptoms   
discussed with the patient.   
Reviewed appropriate action plan   
to take if red flag symptoms   
occur. Patient agreeable to   
treatment plan.  
  
BOLIVAR Durand.CNP  
Electronically signed by   
Olimpia Awad APRN.CNP at   
2023 3:32 PM EST  
documented in this encounter            [facility]  
   
                                        2023 Instructions   
  
  
Natalia Ospina APRN.CNP -   
2023 6:27 PM ESTFormatting   
of this note might be different   
from the original.  
Obtain blood work and make   
follow-up appointment  
  
Have eye doctor send recent   
office visit note  
Electronically signed by Natalia Ospina APRN.CNP at 2023   
6:28 PM EST  
  
documented in this encounter            [facility]  
   
                                                    2023 History of Presen  
t   
illness Narrative                       Formatting of this note is   
different from the original.  
2023  
Patient presents with:  
ED Follow-up: [facility] 22 from   
a fall  
  
SUBJECTIVE: This is a 32 year   
old that is here today for Above   
Complaints.  
  
HOSPITAL/ER FOLLOW UP:  
Reason for visit: fall  
Which facility: [facility]  
Date of visit: 2022  
Diagnosis: non displaced rib   
fracture right rib #10, fall  
Testing done: xray  
Treatment given: pain medication  
  
Fell after a strong milo of wind   
and struck the right side of her   
back against some concrete   
stairs. Was taking flexeril and   
Vicodin but stopped a few days   
ago. Overall feeling improved.   
Using some naproxen for pain   
with some relief. Able to take   
deep breaths. Denies SOB,   
dyspnea, or coughing  
  
Reports to me she has stopped   
taking all her medications.   
Patient is unsure when she   
stopped her medications. Reports   
her blood sugar has been good.   
Last night it was 180 which she   
reports  goggle  said was fine.  
  
Follows with psychiatry with   
next appointment upcoming on   
. Denies depressive   
symptoms, SI, HI or insomnia  
  
Was taking Lamictal for hx of   
seizures but stopped that as   
well. Denies seizure activit and   
has not seen her neurologist in   
years  
  
ER visit reviewed  
  
PAST MEDICAL HISTORY  
Diagnosis Date  
Anemia  
Anxiety  
Autism spectrum  
Bipolar I disorder, most recent   
episode (or current) manic, mild   
(HCC)  
Diabetes type 2, controlled   
(HCC)  
Fragile x chromosome  
Herpes labialis  
Oral herpes  
History of epilepsy  
History of migraine  
History of tobacco use  
Other and unspecified ovarian   
cyst  
Ovarian cyst  
PTSD (post-traumatic stress   
disorder)  
Unspecified , without   
mention of complication,   
unspecified  
6 Miscarriages  
Unspecified asthma(493.90)  
Urge incontinence  
Korey Saleem  
  
ALLERGIES Ambien [Zolpidem],   
Latex, and Sulfa (Sulfonamide   
Antibiotics)  
MEDICATIONS  
Current Outpatient Medications  
Medication Sig  
desmopressin acetate (DDAVP) 0.2   
mg tablet Take 1 tablet by mouth   
once daily.  
oxybutynin ER (DITROPAN XL) 10   
mg 24 hr tablet Take 1 tablet by   
mouth once daily.  
metFORMIN ER (GLUCOPHAGE XR) 500   
mg 24 hr tablet Take 2 tablets   
by mouth twice daily.  
glipiZIDE (GLUCOTROL XL) 10mg 24   
hr tablet Take 1 tablet by mouth   
once daily.  
empagliflozin (JARDIANCE) 10 mg   
tablet Take 1 tablet by mouth   
daily with breakfast.  
propranolol (INDERAL) 20 mg   
tablet Take 1 tablet by mouth   
twice daily.  
folic acid 1 mg tablet Take 2   
tablets by mouth once daily.  
SUMAtriptan (IMITREX) 100 mg   
tablet Take 1 tablet by mouth as   
needed.  
sertraline (ZOLOFT) 50 mg tablet   
Take 1 tablet by mouth once   
daily.  
albuterol HFA (VENTOLIN HFA) 90   
mcg/actuation inhaler Inhale 2   
Puffs as instructed every 4   
hours as needed for   
Wheezing/Shortness of Breath.  
blood sugar diagnostic (BLOOD   
GLUCOSE TEST) test strip Test   
blood sugar(s) 1-2 times daily.   
Dx: Type 2 DM - Controlled E11.9   
Insulin: No  
L.acid/L.casei/B.bif/B.melinda/FOS   
(PROBIOTIC BLEND ORAL) Take by   
mouth.  
prenatal 25/iron fum/folic/dha   
(PRENATAL-1 ORAL) Take by mouth.  
lamoTRIgine (LAMICTAL) 100 mg   
tablet Take 1 tablet by mouth   
once daily.  
Diaper,Brief, Adult,Disposable   
(ADULT BRIEFS - MEDIUM) misc 1   
application once daily. Patient   
needs pull-up type, no tabs  
1 Application once daily  
acetaminophen (TYLENOL) 500 mg   
tablet Take 500 mg by mouth   
every 8 hours as needed.  
  
No current facility-administered   
medications for this visit.  
  
Medications and allergies   
reviewed by this provider.  
SOCIAL HISTORY  
Social History  
  
Tobacco Use  
Smoking status: Every Day  
Packs/day: 0.50  
Years: 10.00  
Pack years: 5.00  
Types: Cigarettes  
Last attempt to quit: 2009  
Years since quittin.5  
Smokeless tobacco: Never  
Tobacco comments:  
5-7 ciggs/day  
Vaping Use  
Vaping Use: Never used  
Substance Use Topics  
Alcohol use: Yes  
Comment: rarely  
Drug use: No  
  
REVIEW OF SYSTEMS  
All other reviewed and negative   
other than HPI.  
  
OBJECTIVE:  
/88   Pulse 114   Resp 18   
  Wt 86.2 kg (190 lb)   LMP   
05/15/2022 (Approximate)   SpO2   
98%   BMI 33.13 kg/m . Vital   
signs reviewed by this provider.  
APPEARANCE Well appearing,   
alert, in no acute distress,   
well-hydrated, well nourished.  
EYES conjunctiva and sclera   
normal.  
HEART RRR with normal S1 and S2,   
no murmurs, no gallops, no JVD   
appreciated  
LUNG clear to auscultation  
BACK: Mild TTP to right side at   
the level of her bra strap. No   
ecchymosis, swelling or   
deformity observed  
SKIN Skin color, texture, turgor   
normal, no suspicious rashes or   
lesions to exposed skin  
PSYCH:  
Posture and motor behavior:   
normal posture and motor   
behavior  
Dress, grooming, personal   
hygiene: normal dress and   
grooming  
Facial expression: good eye   
contact  
Speech: talkative  
Mood: cheerful  
Coherency and relevance of   
thought: normal thought   
processes  
Memory: normal memory  
  
Component Latest Ref Rng & Units   
6/15/2022  
Protein, Total 6.3 - 8.0 g/dL   
6.4  
Albumin 3.9 - 4.9 g/dL 4.2  
Calcium 8.5 - 10.2 mg/dL 9.9  
Bilirubin, Total 0.2 - 1.3 mg/dL   
0.3  
Alkaline Phosphatase 34 - 123   
U/L 63  
AST 13 - 35 U/L 14  
ALT 7 - 38 U/L 17  
Glucose 74 - 99 mg/dL 325 (H)  
BUN 7 - 21 mg/dL 9  
Creatinine 0.58 - 0.96 mg/dL   
0.73  
Sodium 136 - 144 mmol/L 138  
Potassium 3.7 - 5.1 mmol/L 4.8  
Chloride 97 - 105 mmol/L 101  
CO2 22 - 30 mmol/L 26  
Anion Gap 9 - 18 mmol/L 11  
eGFR >=60 mL/min/1.73m 112  
WBC 3.70 - 11.00 k/uL 7.22  
RBC 3.90 - 5.20 m/uL 4.59  
Hemoglobin 11.5 - 15.5 g/dL 13.6  
Hematocrit 36.0 - 46.0 % 42.0  
MCV 80.0 - 100.0 fL 91.5  
MCH 26.0 - 34.0 pg 29.6  
MCHC 30.5 - 36.0 g/dL 32.4  
RDW-CV 11.5 - 15.0 % 12.5  
Platelet Count 150 - 400 k/uL   
196  
MPV 9.0 - 12.7 fL 11.6  
Absolute nRBC <0.01 k/uL <0.01  
Total Cholesterol, Nonfasting   
<200 mg/dL 158  
Triglycerides, Nonfasting <150   
mg/dL 242 (H)  
HDL Cholesterol, Nonfasting >39   
mg/dL 34 (L)  
LDL Cholesterol, Nonfasting <100   
mg/dL 76  
Non HDL Cholesterol, Nonfasting   
<130 mg/dL 124  
VLDL Cholesterol, Nonfasting <30   
mg/dL 48 (H)  
Total Chol/HDL Ratio, Nonfasting   
<5.10 mg/dL 4.65  
LDL/HDL Ratio, Nonfasting <2.54   
mg/dL 2.24  
Creatinine, Ur Random (UCRR)   
20.0 - 300.0 mg/dL 71.8  
Albumin, Urine Random mg/L <12.0  
Albumin/Creat Ratio <30 mg/g <17  
Hemoglobin A1C 4.3 - 5.6 % 12.2   
(H)  
Estimated Average Glucose mg/dL   
303  
  
COVID-19 VACCINE(1) Never done  
PNEUMOCOCCAL(2 - PCV) due on   
10/15/2019  
DILATED RETINAL EXAM due on   
2021  
INFLUENZA(1) due on 2022  
HBA1C due on 09/15/2022  
DEPRESSION ASSESSMENT Never done  
DIABETIC FOOT EXAM due on   
2023  
URINE ALBUMIN:CREATININE RATIO   
due on 06/15/2023  
LDL CHOLESTEROL due on   
06/15/2023  
ANNUAL PCP TEAM CHRONIC DISEASE   
VISIT due on 2024  
PAP TESTING due on 2025  
HPV TESTING due on 2025  
DTAP,TDAP,TD(3 - Td or Tdap) due   
on 2030  
HEPATITIS C SCREENING Completed  
HIV SCREENING Completed  
SPIROMETRY Discontinued  
  
ASSESSMENT/PLAN:  
1. Closed fracture of one rib of   
right side with routine healing,   
subsequent encounter - ICD9:   
V54.19, ICD10: S22.31XD (primary   
diagnosis)  
- no red flag symptoms or exam   
findings  
- red flag symptoms discussed,   
verbalizes understanding  
- continue to cough and deep   
breath  
- may use small pillow to brace   
for discomfort with coughing,   
sneezing or deep breathing  
- may use OTC pain reliever as   
directed on packaging  
- follow-up if symptoms fail to   
resolve  
  
2. Uncontrolled type 2 diabetes   
mellitus with hyperglycemia   
(HCC) - ICD9: 250.02, ICD10:   
E11.65  
uncontrolled  
- Blood glucose monitoring on a   
twice a day schedule  
- Ophthalmology referral for   
eval/management of diabetic eye   
changes  
- Encouraged regular aerobic   
exercise and weight loss  
- BP goal of <130/80  
- LDL goal of <100  
- recommend she complete labs,   
restart her medications, and   
make follow-up appointment  
- HGB A1C  
- COMP METABOLIC PANEL  
  
3. Psychogenic nonepileptic   
seizure - ICD9: 300.11, ICD10:   
F44.5  
- recommend she follow-up with   
neurology to discuss  
  
4. Bipolar affective disorder,   
remission status unspecified   
(HCC) - ICD9: 296.80, ICD10:   
F31.9  
- continue to follow with   
psychiatry  
  
BOLIVAR Jarrell.CNP  
  
Prescription instructions   
reviewed with patient as   
applicable. Patient advised if   
symptoms do not improve or if   
symptoms worsen sooner, to   
contact their primary care   
physician. Potential red flag   
symptoms discussed with the   
patient. Reviewed appropriate   
action plan to take if red flag   
symptoms occur. Patient   
agreeable to treatment plan.  
  
I spent a total of 35 minutes on   
the date of the service which   
included preparing to see the   
patient, face-to-face patient   
care, completing clinical   
documentation, obtaining and/or   
reviewing separately obtained   
history, performing a medically   
appropriate examination,   
counseling and educating the   
patient/family/caregiver, and   
ordering medications, tests, or   
procedures.  
  
  
  
  
Electronically signed by BOLIVAR Jarrell.CNP at 2023   
6:48 PM EST  
documented in this encounter            [facility]  
   
                                                    2022 Miscellaneous   
Notes                                   Formatting of this note might be   
different from the original.  
Patient seen in ER 22.   
Reached out to patient to advise   
PCP recommends follow up if not   
feeling better by 23.   
Patient voiced understanding but   
stated  It takes like 3 months   
to heal.   
Electronically signed by Chandni Dailey LPN at 2022 4:49   
PM EST  
documented in this encounter            [facility]  
   
                                                    2022 Hospital Discharg  
e   
instructions                              
  
  
Patient Education  
  
  
2022 10:38:57  
  
  
Rib Contusion  
  
  
Rib Contusion  
  
A rib contusion is a bruise to   
one or more rib bones. It may   
cause pain, tenderness, swelling   
and a purplish discoloration.   
There may be a sharp pain while   
breathing.  
You will be assessed for other   
injuries. You will likely be   
given pain medicine. Rib   
contusions heal on their own,   
without further treatment.   
However, pain may take weeks to   
months to go away.  
Note that a small crack   
(fracture) in the rib may cause   
the same symptoms as a rib   
contusion. The small crack may   
not be seen on a chest X-ray.   
However, the conditions are   
managed in the same way.  
Home care  
Rest. Avoid heavy lifting,   
strenuous exertion, or any   
activity that causes pain.  
Ice the area to reduce pain and   
swelling. Put ice cubes in a   
plastic bag or use a cold pack.   
(Wrap the cold source in a thin   
towel. Do not place it directly   
on your skin.) Ice the injured   
area for 20 minutes every 1 to 2   
hours the first day. Continue   
with ice packs 3 to 4 times a   
day for the next 2 days, then as   
needed for the relief of pain   
and swelling.  
Take any prescribed pain   
medicine as directed by your   
healthcare provider. If none was   
prescribed, take acetaminophen,   
ibuprofen, or naproxen to   
control pain.  
If you have a significant   
injury, you may be given a   
device called an incentive   
spirometer to keep your lungs   
healthy. Use as directed.  
Follow-up care  
Follow up with your healthcare   
provider during the next week or   
as directed.  
When to seek medical advice  
Call your healthcare provider   
for any of the following:  
Shortness of breath or trouble   
breathing  
Increasing chest pain with   
breathing  
Coughing  
Dizziness, weakness, or fainting  
New or worsening pain  
Fever of 100.4 F (38 C) or   
higher, or as directed by your   
healthcare provider  
  
[phone] [employer]. [address] [place] [id number]. All rights   
reserved. This information is   
not intended as a substitute for   
professional medical care.   
Always follow your healthcare   
professional's instructions.  
  
  
  
Follow Up Care  
  
  
2022 10:25:58  
  
  
With:DRU LAM MD  
Address:  
[address] [place] [address] [id number]- [phone]  
  
When:2-4 days                           [facility]  
Work Phone:   
[phone]  
   
                                        2022 Note       
  
  
  
  
Discharge Instructions  
Thank you for allowing [facility]   
to assist you with your   
healthcare needs. The following   
is important discharge   
information regarding your   
hospital visit. Diagnosis from   
Today's Visit  
Contusion of rib  
Rib/trunk pain-swelling  
  
  
What to Do Next  
  
Instructions from Your Care Team  
No qualifying data available.  
Post Acute Orders  
  
No qualifying data available.  
  
  
You Need to Schedule the   
Following Appointments  
  
Follow Up with DRU LAM MD  
When Within 2-4 days  
  
Where:  
[address] [place] [address] [phone]- [phone]  
  
  
  
  
Allergies  
  
Ambien  
sulfa drug  
  
  
  
Medications  
Please ask your primary doctor   
or pharmacist before taking any   
other medication not listed,   
including over the counter   
drugs, herbal medications,   
vitamins and or supplements as   
they may interact with your home   
medications.  
  
What How Much When Why   
Instructions Last Dose New   
cyclobenzaprine (Flexeril ***   
use cyclobenzaprine ***)  
10 Milligram  
by mouth  
Two (2) times a day  
Contusion of rib  
Duration: 10 Days  
Printed Prescription  
  
  
  
Please take this list to your   
next doctor s visit. Bring all   
medications you take, including   
over the counter medications,   
herbals and other supplements   
with you to your doctor s visit.   
Patients and families are   
reminded to discard old lists   
and to update any records with   
all medication providers or   
retail pharmacies.  
  
  
  
Medication Leaflets  
cyclobenzaprine  
  
(sye kloe NAT za preen)  
  
  
  
Dora, Comfort Pac with   
Cyclobenzaprine, Fexmid  
  
  
  
What is the most important   
information I should know about   
cyclobenzaprine?  
  
You should not use   
cyclobenzaprine if you have a   
thyroid disorder, heart block,   
congestive heart failure, a   
heart rhythm disorder, or you   
have recently had a heart   
attack.  
  
  
  
Do not use cyclobenzaprine if   
you have taken an MAO inhibitor   
in the past 14 days, such as   
isocarboxazid, linezolid,   
phenelzine, rasagiline,   
selegiline, or tranylcypromine.  
  
  
  
What is cyclobenzaprine?  
  
Cyclobenzaprine is a muscle   
relaxant. It works by blocking   
nerve impulses (or pain   
sensations) that are sent to   
your brain.  
  
  
  
Cyclobenzaprine is used together   
with rest and physical therapy   
to relieve muscle spasms caused   
by painful conditions such as an   
injury.  
  
  
  
Cyclobenzaprine may also be used   
for purposes not listed in this   
medication guide.  
  
  
  
What should I discuss with my   
healthcare provider before   
taking cyclobenzaprine?  
  
You should not use   
cyclobenzaprine if you are   
allergic to it, or if you have:  
  
a thyroid disorder;  
  
heart block, heart rhythm   
disorder, congestive heart   
failure; or  
  
if you have recently had a heart   
attack.  
  
  
  
Cyclobenzaprine is not approved   
for use by anyone younger than   
15 years old.  
  
  
  
Do not use cyclobenzaprine if   
you have taken an MAO inhibitor   
in the past 14 days. A dangerous   
drug interaction could occur.   
MAO inhibitors include   
isocarboxazid, linezolid,   
phenelzine, rasagiline,   
selegiline, and tranylcypromine.  
  
  
  
Some medicines can interact with   
cyclobenzaprine and cause a   
serious condition called   
serotonin syndrome. Be sure your   
doctor knows if you also take   
stimulant medicine, opioid   
medicine, herbal products, or   
medicine for depression, mental   
illness, Parkinson's disease,   
migraine headaches, serious   
infections, or prevention of   
nausea and vomiting. Ask your   
doctor before making any changes   
in how or when you take your   
medications.  
  
  
  
Tell your doctor if you have   
ever had:  
  
liver disease;  
  
glaucoma;  
  
enlarged prostate; or  
  
problems with urination.  
  
  
  
It is not known whether this   
medicine will harm an unborn   
baby. Tell your doctor if you   
are pregnant or plan to become   
pregnant.  
  
  
  
It may not be safe to   
breast-feed while using this   
medicine. Ask your doctor about   
any risk.  
  
  
  
Older adults may be more   
sensitive to the effects of this   
medicine.  
  
  
  
How should I take   
cyclobenzaprine?  
  
Follow all directions on your   
prescription label and read all   
medication guides or instruction   
sheets. Your doctor may   
occasionally change your dose.   
Use the medicine exactly as   
directed.  
  
  
  
Cyclobenzaprine is usually taken   
once daily for only 2 or 3   
weeks. Follow your doctor's   
dosing instructions very   
carefully.  
  
  
  
Swallow the capsule whole and do   
not crush, chew, break, or open   
it.  
  
  
  
Take the medicine at the same   
time each day.  
  
  
  
Call your doctor if your   
symptoms do not improve after 3   
weeks, or if they get worse.  
  
  
  
Store at room temperature away   
from moisture, heat, and light.  
  
  
  
What happens if I miss a dose?  
  
Take the medicine as soon as you   
can, but skip the missed dose if   
it is almost time for your next   
dose. Do not take two doses at   
one time.  
  
  
  
What happens if I overdose?  
  
Seek emergency medical attention   
or call the Poison Help line at   
[phone]. An overdose of   
cyclobenzaprine can be fatal.  
  
  
  
Overdose symptoms may include   
severe drowsiness, vomiting,   
fast heartbeats, tremors,   
agitation, or hallucinations.  
  
  
  
What should I avoid while taking   
cyclobenzaprine?  
  
Avoid driving or hazardous   
activity until you know how this   
medicine will affect you. Your   
reactions could be impaired.  
  
  
  
Avoid drinking alcohol.   
Dangerous side effects could   
occur.  
  
  
  
What are the possible side   
effects of cyclobenzaprine?  
  
Get emergency medical help if   
you have signs of an allergic   
reaction: hives; difficult   
breathing; swelling of your   
face, lips, tongue, or throat.  
  
  
  
Stop using cyclobenzaprine and   
call your doctor at once if you   
have:  
  
fast or irregular heartbeats;  
  
chest pain or pressure, pain   
spreading to your jaw or   
shoulder; or  
  
sudden numbness or weakness   
(especially on one side of the   
body), slurred speech, balance   
problems.  
  
  
  
Seek medical attention right   
away if you have symptoms of   
serotonin syndrome, such as:   
agitation, hallucinations,   
fever, sweating, shivering, fast   
heart rate, muscle stiffness,   
twitching, loss of coordination,   
nausea, vomiting, or diarrhea.  
  
  
  
Serious side effects may be more   
likely in older adults.  
  
  
  
Common side effects may include:  
  
drowsiness, tiredness;  
  
headache, dizziness;  
  
dry mouth; or  
  
upset stomach, nausea,   
constipation.  
  
  
  
This is not a complete list of   
side effects and others may   
occur. Call your doctor for   
medical advice about side   
effects. You may report side   
effects to FDA at   
7-758-HRU-2128.  
  
  
  
What other drugs will affect   
cyclobenzaprine?  
  
Using cyclobenzaprine with other   
drugs that make you drowsy can   
worsen this effect. Ask your   
doctor before using opioid   
medication, a sleeping pill, a   
muscle relaxer, or medicine for   
anxiety or seizures.  
  
  
  
Tell your doctor about all your   
other medicines, especially:  
  
bupropion (Zyban, for smoking   
cessation);  
  
meperidine;  
  
tramadol;  
  
verapamil;  
  
cold or allergy medicine that   
contains an antihistamine   
(Benadryl and others);  
  
medicine to treat Parkinson's   
disease;  
  
medicine to treat excess stomach   
acid, stomach ulcer, motion   
sickness, or irritable bowel   
syndrome;  
  
medicine to treat overactive   
bladder; or  
  
bronchodilator asthma   
medication.  
  
  
  
This list is not complete. Other   
drugs may affect   
cyclobenzaprine, including   
prescription and   
over-the-counter medicines,   
vitamins, and herbal products.   
Not all possible drug   
interactions are listed here.  
  
  
  
Where can I get more   
information?  
  
Your pharmacist can provide more   
information about   
cyclobenzaprine.  
  
  
  
Remember, keep this and all   
other medicines out of the reach   
of children, never share your   
medicines with others, and use   
this medication only for the   
indication prescribed.  
  
  
  
Every effort has been made to   
ensure that the information   
provided by [employer].   
('[employer]') is accurate,   
up-to-date, and complete, but no   
guarantee is made to that   
effect. Drug information   
contained herein may be time   
sensitive. [employer] information   
has been compiled for use by   
healthcare practitioners and   
consumers in the [place]   
and therefore [employer] does not   
warrant that uses outside of the   
[place] are appropriate,   
unless specifically indicated   
otherwise. [facility] drug   
information does not endorse   
drugs, diagnose patients or   
recommend therapy. [facility] drug   
information is an informational   
resource designed to assist   
licensed healthcare   
practitioners in caring for   
their patients and/or to serve   
consumers viewing this service   
as a supplement to, and not a   
substitute for, the expertise,   
skill, knowledge and judgment of   
healthcare practitioners. The   
absence of a warning for a given   
drug or drug combination in no   
way should be construed to   
indicate that the drug or drug   
combination is safe, effective   
or appropriate for any given   
patient. [employer] does not assume   
any responsibility for any   
aspect of healthcare   
administered with the aid of   
information [employer] provides. The   
information contained herein is   
not intended to cover all   
possible uses, directions,   
precautions, warnings, drug   
interactions, allergic   
reactions, or adverse effects.   
If you have questions about the   
drugs you are taking, check with   
your doctor, nurse or   
pharmacist.  
  
  
  
Copyright 8789-2745 [employer]. Version: 5.01.   
Revision Date: 2018.  
  
  
  
  
  
  
Education Materials  
  
  
  
Rib Contusion  
  
A rib contusion is a bruise to   
one or more rib bones. It may   
cause pain, tenderness, swelling   
and a purplish discoloration.   
There may be a sharp pain while   
breathing.  
You will be assessed for other   
injuries. You will likely be   
given pain medicine. Rib   
contusions heal on their own,   
without further treatment.   
However, pain may take weeks to   
months to go away.  
Note that a small crack   
(fracture) in the rib may cause   
the same symptoms as a rib   
contusion. The small crack may   
not be seen on a chest X-ray.   
However, the conditions are   
managed in the same way.  
Home care  
Rest. Avoid heavy lifting,   
strenuous exertion, or any   
activity that causes pain.  
Ice the area to reduce pain and   
swelling. Put ice cubes in a   
plastic bag or use a cold pack.   
(Wrap the cold source in a thin   
towel. Do not place it directly   
on your skin.) Ice the injured   
area for 20 minutes every 1 to 2   
hours the first day. Continue   
with ice packs 3 to 4 times a   
day for the next 2 days, then as   
needed for the relief of pain   
and swelling.  
Take any prescribed pain   
medicine as directed by your   
healthcare provider. If none was   
prescribed, take acetaminophen,   
ibuprofen, or naproxen to   
control pain.  
If you have a significant   
injury, you may be given a   
device called an incentive   
spirometer to keep your lungs   
healthy. Use as directed.  
Follow-up care  
Follow up with your healthcare   
provider during the next week or   
as directed.  
When to seek medical advice  
Call your healthcare provider   
for any of the following:  
Shortness of breath or trouble   
breathing  
Increasing chest pain with   
breathing  
Coughing  
Dizziness, weakness, or fainting  
New or worsening pain  
Fever of 100.4 F (38 C) or   
higher, or as directed by your   
healthcare provider  
7952-4618 [employer]. [address] [place] [address],   
[place] [id number]. All rights   
reserved. This information is   
not intended as a substitute for   
professional medical care.   
Always follow your healthcare   
professional's instructions.  
  
  
  
  
  
Additional Information  
VACCINATE! IT SAVES LIVES!  
Members of the community who   
have not yet received the   
COVID-19 vaccine and would like   
to receive it can visit one of   
[facility] vaccine clinics. There   
are many vaccine clinic   
locations within the [facility]. For   
locations and available times,   
please visit   
[email or web address]  
[email or web address]. It is important to note   
that some COVID mobile vaccine   
clinics are held outdoors and   
may be canceled in rainy or   
stormy conditions.  
To learn more about pediatric   
vaccinations (ages 5-11), we   
invite you to visit the [employer]   
Childrens webpage.   
https://[email or web address]/p  
ages/2019-Novel-Coronavirus-Freq  
bpvryk-Uvqmr-Tshlybmfn.html To   
learn more about the COVID-19   
vaccine, we invite you to visit   
the [place] website for a list   
of frequently asked questions.   
[email or web address]  
[email or web address]  
requently_[email or web address]-Questions.pdf  
[facility] [facility] Patient Portal   
Access Instructions:  
Stay connected with your   
healthcare team and access your   
personal medical information   
anytime with the [facility] Patient Portal. If you   
would like a full copy of your   
medical records please contact   
the [facility] Medical   
Records Department Monday   
through Friday between 8a.m. and   
4:30p.m. Please follow the   
directions below to access the   
portal:  
1.Access the email account you   
provided upon registration to   
the [facility].2.Look for an   
invitation email from [facility].3.Open the email and   
access the invitation link:   
Accept Invitation to [facility].Fill in the required   
fields to create your account.  
Sign into [email or web address] with   
your username and password that   
you created in the above steps   
to stay up to date. You can then   
view a summary of results, a   
summary of your visits, and the   
ability to download your   
summaries to your computer or   
send the information securely to   
a physician. Remember that your   
healthcare information is   
confidential, so carefully   
consider who you will allow to   
register on the [facility]   
Patient Portal for access to   
your information. You can also   
access the [facility]   
Patient Portal on the [employer]   
[facility] chiqui. Simply click on   
 Health Records  under  Health   
Data  and then click on the   
[employer] logo.  
  
  
HOW TO SAFELY DISPOSE OF   
PRESCRIPTION MEDICATIONS  
Please use one of the following   
methods to safely dispose of   
your unused medications.  
1.Use a drug disposal kit: the   
drug disposal pouch allows you   
to safely discard your old and   
unused drugs. Ask your nurse to   
give you one when you are   
discharged.2.Visit a local   
take-back location: Many local   
pharmacies and police   
departments have programs that   
collect old and unwanted   
prescription drugs. Call your   
local pharmacy or go to   
http://[employer].REGISTRAT-MAPI/3I8Uv6x to find   
one close to you.3.Make use of   
household items: Use cat litter   
or old coffee grounds to dispose   
medications if other options are   
not available. Mix your drugs   
with these household products,   
seal them in an airtight   
container and throw it into the   
garbage. Call [facility]:   
[phone] to be sure your   
drugs can be disposed of in this   
way. Some medicines may require   
a different approach.4.Never   
flush your medications down the   
toilet.  
IF YOU HAVE BEEN PRESCRIBED AN   
OPIOIDS FOR PAIN  
If you have been prescribed an   
opioid (such as hydrocodone,   
oxycodone or morphine), it is   
critical to understand the   
possible side effects and risks   
of opioid pain medications. Even   
when taken as directed, opioids   
can have several side effects   
including:  
Tolerance, meaning you might   
need to take more of a   
medication for the same pain   
relief. Nausea, vomiting and/or   
constipation. Sleepiness,   
dizziness, dry mouth, confusion,   
depression or itching. Physical   
dependence, meaning you have   
withdrawal symptoms when a   
medication is stopped ? this can   
develop within a few days.  
KNOW YOUR RESPONSIBILITIES  
It is important to know exactly   
how much and how often to take   
the opioid pain medications you   
are prescribed.  Never take   
opioids in higher amounts or   
more often than prescribed. Do   
not combine opioids with alcohol   
or other drugs that cause   
drowsiness, such as   
benzodiazepines, also known as   
benzos, including diazepam and   
alprazolam, muscle relaxants or   
sleep aids. Never sell or share   
prescription opioids. This is   
illegal. Store opioids in a   
secure place and out of reach of   
others (including children,   
family, friends and visitors).  
  
The last page(s) of this   
document has been signed and   
retained as a CHART COPY  
  
  
Signatures  
  
Patient Education Materials  
  
Rib Contusion  
  
  
Medication Leaflets  
cyclobenzaprine  
My discharge plan and   
instructions have been reviewed   
and explained to me and I,FRANKIE MCKEON understand my current   
condition and have read and   
understand these discharge   
instructions. I have received a   
written copy of the   
plan/instructions. If I have   
questions, I am aware that I   
should contact my doctor.  
Patient/Representative   
Signature:______________________  
_______ Date/Time: _____________  
Relationship to   
Patient:________________________  
_____  
Witness   
Name/Signature:_________________  
____________ Date/Time:   
_____________  
  
                                        [facility]  
   
                                                    2022 History of Presen  
t   
illness Narrative                       Formatting of this note might be   
different from the original.  
Patient fell last night on ice,   
landing hard on back, having rib   
pain and shortness of breath.  
  
Due to lack of imaging today and   
level of pain will need to go to   
ED for further evaluation and   
treatment.  
  
BOLIVAR Yun.KATHLEEN  
  
Electronically signed by Marce Luevano APRN.CNP at 2022   
12:43 PM EST  
documented in this encounter            [facility]  
   
                                                    10- History of Presen  
t   
illness Narrative                       Formatting of this note might be   
different from the original.  
Unable to connect to virtual   
with camera tried calling pt but   
no answer x 2 but left a   
detailed VM  
And refilled Ditropan and gave   
new providers in place of Dr. Rodriguez.  
  
DIEGO Aguilar, RORY MCMILLAN  
  
Electronically signed by Korey Saleem PA-C at 10/17/2022 5:03   
PM EDT  
documented in this encounter            [facility]  
   
                                                    10- Miscellaneous   
Notes                                   Formatting of this note might be   
different from the original.  
Called patient due to request   
for refill with last visit with   
Korey Saleem PA-C 2021.   
I did advise patient of need to   
schedule appointment with   
Korey Saleem, PA-C and   
transferred her to scheduling.   
Patient did verbalize   
frustration of needing an   
appointment when medication does   
help and have had no changes.   
Martine Turcios LPN  
  
Electronically signed by   
Martine Turcios LPN at 10/17/2022   
11:47 AM EDT  
documented in this encounter            [facility]  
   
                                                    10- Miscellaneous   
Notes                                   Formatting of this note is   
different from the original.  
Pharmacy faxed requesting the   
following refill.  
  
Requested Prescriptions  
  
Pending Prescriptions Disp   
Refills  
desmopressin acetate (DDAVP) 0.2   
mg tablet 30 tablet 11  
Sig: Take 1 tablet by mouth once   
daily.  
  
Patient last appointment:   
2021  
  
Patient Phone numbers:   
[phone] (home)  
  
Request is for script(s) to be   
escript to pharmacy.  
  
Anne Jha MA  
  
  
Electronically signed by   
Anne Jha MA at 10/17/2022   
9:39 AM EDT  
documented in this encounter            [facility]  
   
                                                    2022 Miscellaneous   
Notes                                     
  
  
Formatting of this note might be   
different from the original.  
Patient given results and   
verbalized understanding of   
instructions given.  
Antoinette Woodward  
  
  
  
Electronically signed by Antoinette Woodward at 2022 4:48 PM EDT  
  
  
Formatting of this note might be   
different from the original.  
Urine culture grew bacteria   
indicating a bacterial urinary   
tract infection in addition to   
the yeast infection. Rx for   
nitrofurantoin sent to the   
pharmacy.  
  
  
Electronically signed by Zane Oconnor MD at 2022 1:44   
PM EDTdocumented in this   
encounter                               [facility]  
   
                                                    2022 Miscellaneous   
Notes                                     
  
  
Formatting of this note might be   
different from the original.  
Pt called and is notified of   
providers results and   
instructions. Pt voices   
understanding.  
  
Kathie Marks RN  
  
  
  
Electronically signed by Kathie Marks RN at 2022 11:08   
AM EDT  
  
  
Formatting of this note might be   
different from the original.  
This NP called patient at   
[phone] and identified with   
name and .  
Patient notified of positive   
yeast, prescription sent to   
fluconazole  
Will call with urine culture   
back.  
  
Marce Luevano CNP  
  
  
  
  
Electronically signed by Marce Luevano APRN.CNP at 2022   
10:54 AM EDTdocumented in this   
encounter                               [facility]  
   
                                                    2022 History of Presen  
t   
illness Narrative                         
  
  
Formatting of this note is   
different from the original.  
Subjective  
The history is provided by the   
patient. No    
was used.  
  
HPI Frankie Mckeon is a 31 year   
old female who presents today   
for CC of frequency and burning   
with urination  
Positive for Dysuria and   
Increase in frequency of   
urination, Negative for Urgency,   
Sense of incomplete void,   
Fevers, Vomiting, Diarrhea,   
Abdominal pain , Back/Flank   
pain, Blood in urine and Vaginal   
itch or discharge  
Chance of Pregnancy: No  
Last intercourse: n/a  
Any self-treatment attempted: No  
Number of previous UTI's in last   
6 months:0  
Number of previous UTI's in last   
12 months: 0  
Aggravating Factors: voiding  
Alleviating Factors include   
Increasing fluids with no relief   
in symptoms.  
Patient is a diabetic and BS   
have been high. Aware that this   
may be causing symptoms  
/82   Pulse 102   Temp 37   
C (98.6 F)   Resp 21   Wt 73.1   
kg (161 lb 3.2 oz)   LMP   
05/15/2022 (Approximate)   SpO2   
99%   BMI 28.11 kg/m  
Social History  
  
Tobacco Use  
Smoking status: Current Every   
Day Smoker  
Packs/day: 0.50  
Years: 10.00  
Pack years: 5.00  
Types: Cigarettes  
Last attempt to quit: 2009  
Years since quittin.0  
Smokeless tobacco: Never Used  
Tobacco comment: 5-7 ciggs/day  
Vaping Use  
Vaping Use: Never used  
Substance Use Topics  
Alcohol use: Yes  
Comment: rarely  
Drug use: No  
  
PAST MEDICAL HISTORY  
Diagnosis Date  
Anemia  
Anxiety  
Autism spectrum  
Bipolar I disorder, most recent   
episode (or current) manic, mild   
(HCC)  
Diabetes type 2, controlled   
(HCC)  
Fragile x chromosome  
Herpes labialis  
Oral herpes  
History of epilepsy  
History of migraine  
History of tobacco use  
Other and unspecified ovarian   
cyst  
Ovarian cyst  
PTSD (post-traumatic stress   
disorder)  
Unspecified , without   
mention of complication,   
unspecified  
6 Miscarriages  
Unspecified asthma(493.90)  
Urge incontinence  
Korey Saleem  
  
I have confirmed and edited as   
necessary, the [facility]  
  
Review of Systems  
Constitutional: Negative for   
chills and fever.  
Gastrointestinal: Negative for   
abdominal pain, diarrhea, nausea   
and vomiting.  
Genitourinary: Positive for   
dysuria. Negative for flank   
pain, frequency, hematuria and   
urgency.  
  
Objective  
Physical Exam  
Vitals and nursing note   
reviewed.  
Constitutional:  
Appearance: Normal appearance.  
Abdominal:  
General: Bowel sounds are   
normal. There is no abdominal   
bruit.  
Palpations: Abdomen is not   
rigid. There is no mass or   
pulsatile mass.  
Tenderness: There is no   
abdominal tenderness. There is   
no guarding or rebound. Negative   
signs include Almeida's sign and   
McBurney's sign.  
Neurological:  
Mental Status: She is alert and   
oriented to person, place, and   
time.  
Psychiatric:  
Mood and Affect: Affect normal.  
  
ASSESSMENT/PLAN:  
1. Urinary frequency - ICD9:   
788.41, ICD10: R35.0 (primary   
diagnosis)  
acute  
- Send urine for culture  
- Urine sent for culture  
- If negative referral in for   
urology  
- Advise follow unit(s) pwith   
PCP for bs management.  
- UA DIP, URINE (POC)  
- URINE CULTURE  
- URINALYSIS, WITH MICROSCOPIC  
- CONSULT TO UROLOGY  
  
2. Acute vaginitis - ICD9:   
616.10, ICD10: N76.0  
Will send urine for culture,   
notify of findings and treatment   
needed  
- CANDIDA / TRICHOMONAS   
AMPLIFICATION  
- BACTERIAL VAGINOSIS   
AMPLIFICATION  
  
Diagnosis and treatment plan   
were discussed and questions   
were answered to the patient's   
satisfaction. Pt acknowledged   
understanding of concepts and   
follow up plan.  
Specific signs and symptoms that   
would indicate the need for   
higher level of care were   
discussed in detail warranting   
prompt ER evaluation.  
Marce Luevano APRN.CNP  
  
  
  
Electronically signed by Marce Luevano APRN.CNP at 2022   
6:48 PM EDTdocumented in this   
encounter                               [facility]  
   
                                        2022 Instructions   
  
  
Marce Luevano APRN.CNP -   
2022 6:12 PM EDT  
  
  
Formatting of this note might be   
different from the original.  
Will send urine for culture,   
will send vaginal cultures.  
  
If all negative, will refer to   
urology for further treatment  
  
-Follow up with PCP or return to   
clinic if symptoms not improving   
in 3 days or if you develop any   
new (or worsening) symptoms such   
as fever, chills or back pain go   
to ER.  
  
  
  
Electronically signed by Marce Luevano APRN.CNP at 2022   
6:15 PM EDT  
documented in this encounter            [facility]  
   
                                                    07- History of Presen  
t   
illness Narrative                         
  
  
Formatting of this note might be   
different from the original.  
Patient called and rescheduled   
for 4pm appointment with PCP.   
Assisted with step by step to   
get onto video call. Patient   
with short patience and very   
agitated. Patient had to redo   
precheck in questions, very   
frustrated with doing so. This   
nurse waited on phone with   
patient to make sure she would   
be able to log on to virtual   
appointment. Patient upset and   
saying  it still isn't freaking   
working, just forget it, just   
cancel the appointment,    
Appointment cancelled per   
patients request. Phone call   
ended.  
Britni Chang LPN  
  
  
  
Electronically signed by   
Britni Chang LPN at   
07/15/2022 4:37 PM EDT  
  
  
Formatting of this note might be   
different from the original.  
Patient could not sign on for   
virtual visit. Our nurse called   
to walk her through the process   
and patient became rude and   
agitated and refused help with   
rescheduling appointment and   
hung up.  
  
  
Electronically signed by   
Dru Lam MD at   
07/15/2022 4:37 PM EDTdocumented   
in this encounter                       [facility]  
   
                                                    2022 Miscellaneous   
Notes                                     
  
  
Formatting of this note might be   
different from the original.  
Spoke with patient via telephone   
and advised she would need to be   
seen in office to address UTI   
concerns. Patient became   
immediately upset and stated  I   
told the lady I talked to this   
morning that I couldn't come in!   
I don't have a car or a ride and   
I'm not walking that far!    
Patient stated  Just cancel the   
appointment. I guess I'll just   
die.  Asked patient if she was   
within walking distance of an   
urgent care and she stated  You   
guys are the closest and I'm not   
walking there!  Again explained   
to patient that her urine would   
need checked and if indicative   
of a UTI a culture would need   
done that advises what   
antibiotic to treat with.   
Patient stated  Just cancel the   
appointment. And let me know if   
tomorrows appt needs cancelled   
too, it's virtual also.  Advised   
patient this nurse will call   
back and advise.  
  
  
Electronically signed by Chandni Dailey LPN at 2022   
11:44 AM EDTdocumented in this   
encounter                               [facility]  
   
                                                    2022 Miscellaneous   
Notes                                     
  
  
Formatting of this note might be   
different from the original.  
Patient is a no show for initial   
PharmD visit today. LMOM to   
return call to office to   
reschedule.  
  
Rossi Nation, PharmD, BCPS  
Primary Care Clinical Pharmacist  
[facility]  
  
  
  
Electronically signed by Rossi Nation RPh at 2022 1:41 PM   
EDTdocumented in this encounter         [facility]  
   
                                        2022 Instructions   
  
  
Raj Gant APRN.CNP -   
2022 11:48 AM EDT  
  
  
Formatting of this note might be   
different from the original.  
1. Restart metformin  
2. Continue taking other   
diabetes medication.  
3. Toradol given for migraine  
  
BOLIVAR Reinoso.KATHLEEN  
  
  
  
Electronically signed by Raj Gant APRN.CNP at 2022   
11:52 AM EDT  
documented in this encounter            [facility]  
   
                                                    2022 History of Presen  
t   
illness Narrative                         
  
  
Formatting of this note is   
different from the original.  
Chief Complaint  
Patient presents with:  
Acute Visit  
  
HPI  
Frankie Mckeon is a 31 year old   
female who presents here today   
for Above Complaints..  
  
Patient is here for multiple   
complaints. Including urinary   
frequency. Patient does not have   
any dysuria. Patient is a   
uncontrolled diabetic. Last   
hemoglobin A1c was 12.2%. She   
has upcoming visits with   
structured diabetic management   
program and her PCP on 07/15.   
Patient stating that they have   
been in the 300s in the morning   
and at night. She has not taken   
her metformin for about 5 days   
as she believed that her PCP   
instructed her. She is not   
interested in any injected   
medications for diabetes.  
  
She also has history of   
migraines. She currently has a   
migraine that has been lasting   
for about 5 days. She is   
prescribed Imitrex, which she   
used but did not work. Remarks   
that today her migraine is mild.   
No aura or new neurologic   
symptoms accompanying this   
migraine. No nausea, vomiting or   
visual changes. Initially had   
some phonophobia and   
photophobia.  
  
Past medical history,   
appointments, medications,   
allergies reviewed.  
  
Previous Medical History  
PAST MEDICAL HISTORY  
Diagnosis Date  
Anemia  
Anxiety  
Autism spectrum  
Bipolar I disorder, most recent   
episode (or current) manic, mild   
(HCC)  
Diabetes type 2, controlled   
(HCC)  
Fragile x chromosome  
Herpes labialis  
Oral herpes  
History of epilepsy  
History of migraine  
History of tobacco use  
Other and unspecified ovarian   
cyst  
Ovarian cyst  
PTSD (post-traumatic stress   
disorder)  
Unspecified , without   
mention of complication,   
unspecified  
6 Miscarriages  
Unspecified asthma(493.90)  
Urge incontinence  
Korey Saleem  
  
Previous Surgical History  
PAST SURGICAL HISTORY  
Procedure Laterality Date  
NONE  
  
Family History  
FAMILY HISTORY  
Adopted: Yes  
Problem Relation Age of Onset  
Heart Mother  
None Father  
UNKNOWN  
Colon Cancer Other  
unsure who  
Cancer Other  
lung, liver (unsure who was who)  
  
Patient Allergies  
ALLERGIES  
Allergen Reactions  
Ambien [Zolpidem] Mental Status   
Change  
Hallucinations (visual)  
Latex Itching  
Sulfa (Sulfonamide * Hives  
  
Current Medications  
Current Outpatient Medications   
on File Prior to Visit  
Medication Sig  
metFORMIN ER (GLUCOPHAGE XR) 500   
mg 24 hr tablet Take 2 tablets   
by mouth twice daily.  
glipiZIDE (GLUCOTROL XL) 10mg 24   
hr tablet Take 1 tablet by mouth   
once daily.  
empagliflozin (JARDIANCE) 10 mg   
tablet Take 1 tablet by mouth   
daily with breakfast.  
desmopressin acetate (DDAVP) 0.2   
mg tablet Take 1 tablet by mouth   
once daily.  
folic acid 1 mg tablet Take 2   
tablets by mouth once daily.  
SUMAtriptan (IMITREX) 100 mg   
tablet Take 1 tablet by mouth as   
needed.  
sertraline (ZOLOFT) 50 mg tablet   
Take 1 tablet by mouth once   
daily.  
oxybutynin ER (DITROPAN XL) 10   
mg 24 hr tablet Take 1 tablet by   
mouth once daily.  
albuterol HFA (VENTOLIN HFA) 90   
mcg/actuation inhaler Inhale 2   
Puffs as instructed every 4   
hours as needed for   
Wheezing/Shortness of Breath.  
L.acid/L.casei/B.bif/B.melinda/FOS   
(PROBIOTIC BLEND ORAL) Take by   
mouth.  
prenatal 25/iron fum/folic/dha   
(PRENATAL-1 ORAL) Take by mouth.  
lamoTRIgine (LAMICTAL) 100 mg   
tablet Take 1 tablet by mouth   
once daily.  
acetaminophen (TYLENOL) 500 mg   
tablet Take 500 mg by mouth   
every 8 hours as needed.  
Blood-Glucose Meter,Continuous   
(DEXCOM G6 ) misc 1   
Device four times daily.  
Blood-Glucose Sensor (DEXCOM G6   
SENSOR) anderson 4 Devices four   
times daily.  
propranolol (INDERAL) 20 mg   
tablet Take 1 tablet by mouth   
twice daily.  
blood sugar diagnostic (BLOOD   
GLUCOSE TEST) test strip Test   
blood sugar(s) 1-2 times daily.   
Dx: Type 2 DM - Controlled E11.9   
Insulin: No  
Diaper,Brief, Adult,Disposable   
(ADULT BRIEFS - MEDIUM) misc 1   
application once daily. Patient   
needs pull-up type, no tabs  
1 Application once daily  
  
No current facility-administered   
medications on file prior to   
visit.  
  
Social History  
Social History  
  
Tobacco Use  
Smoking status: Current Every   
Day Smoker  
Packs/day: 0.50  
Years: 10.00  
Pack years: 5.00  
Types: Cigarettes  
Last attempt to quit: 2009  
Years since quittin.0  
Smokeless tobacco: Never Used  
Tobacco comment: 5-7 ciggs/day  
Vaping Use  
Vaping Use: Never used  
Substance Use Topics  
Alcohol use: Yes  
Comment: rarely  
Drug use: No  
  
REVIEW OF SYSTEMS: as above  
  
Reviewed relevant PMHx, PSHx,   
Social Hx, current medications   
and allergies.  
  
EXAM:  
/86   Pulse 86   Temp 36.1   
C (96.9 F) (Left Tympanic)     
Resp 16   Wt 72.6 kg (160 lb)     
LMP 05/15/2022 (Approximate)     
BMI 27.90 kg/m  
  
General Appearance: Well   
appearing, alert, in no acute   
distress, well-hydrated, well   
nourished..  
Eyes: Anicteric sclera. Pupils   
are equally round and reactive   
to light. Extraocular movements   
are intact. .  
Lungs: Lungs clear to   
auscultation. No wheezing,   
rhonchi, rales..  
Heart: RRR without murmur,   
gallop, or rubs. No ectopy.  
Abdomen: Normal abdominal exam,   
Abdomen soft, non-tender. Bowel   
sounds normal. No masses,   
organomegaly.  
Neurologic: Gait normal.   
Reflexes normal and symmetric.   
Sensation grossly intact..  
  
Health Maintenance List  
COVID-19 VACCINE(1) Never done  
PNEUMOCOCCAL(2 - PCV) due on   
10/15/2019  
DILATED RETINAL EXAM due on   
2021  
INFLUENZA(1) due on 2022  
HBA1C due on 09/15/2022  
DEPRESSION SCREENING due on   
2023  
DIABETIC FOOT EXAM due on   
2023  
ANNUAL PCP TEAM CHRONIC DISEASE   
VISIT due on 2023  
URINE ALBUMIN:CREATININE RATIO   
due on 06/15/2023  
LDL CHOLESTEROL due on   
06/15/2023  
PAP TESTING due on 2025  
HPV TESTING due on 2025  
DTAP,TDAP,TD(3 - Td or Tdap) due   
on 2030  
HEPATITIS C SCREENING Completed  
HIV SCREENING Completed  
SPIROMETRY Discontinued  
  
Data reviewed  
Component Latest Ref Rng & Units   
6/15/2022  
Protein, Total 6.3 - 8.0 g/dL   
6.4  
Albumin 3.9 - 4.9 g/dL 4.2  
Calcium 8.5 - 10.2 mg/dL 9.9  
Bilirubin, Total 0.2 - 1.3 mg/dL   
0.3  
Alkaline Phosphatase 34 - 123   
U/L 63  
AST 13 - 35 U/L 14  
ALT 7 - 38 U/L 17  
Glucose 74 - 99 mg/dL 325 (H)  
BUN 7 - 21 mg/dL 9  
Creatinine 0.58 - 0.96 mg/dL   
0.73  
Sodium 136 - 144 mmol/L 138  
Potassium 3.7 - 5.1 mmol/L 4.8  
Chloride 97 - 105 mmol/L 101  
CO2 22 - 30 mmol/L 26  
Anion Gap 9 - 18 mmol/L 11  
eGFR >=60 mL/min/1.73m 112  
WBC 3.70 - 11.00 k/uL 7.22  
RBC 3.90 - 5.20 m/uL 4.59  
Hemoglobin 11.5 - 15.5 g/dL 13.6  
Hematocrit 36.0 - 46.0 % 42.0  
MCV 80.0 - 100.0 fL 91.5  
MCH 26.0 - 34.0 pg 29.6  
MCHC 30.5 - 36.0 g/dL 32.4  
RDW-CV 11.5 - 15.0 % 12.5  
Platelet Count 150 - 400 k/uL   
196  
MPV 9.0 - 12.7 fL 11.6  
Absolute nRBC <0.01 k/uL <0.01  
Total Cholesterol, Nonfasting   
<200 mg/dL 158  
Triglycerides, Nonfasting <150   
mg/dL 242 (H)  
HDL Cholesterol, Nonfasting >39   
mg/dL 34 (L)  
LDL Cholesterol, Nonfasting <100   
mg/dL 76  
Non HDL Cholesterol, Nonfasting   
<130 mg/dL 124  
VLDL Cholesterol, Nonfasting <30   
mg/dL 48 (H)  
Total Chol/HDL Ratio, Nonfasting   
<5.10 mg/dL 4.65  
LDL/HDL Ratio, Nonfasting <2.54   
mg/dL 2.24  
Creatinine, Ur Random (UCRR)   
20.0 - 300.0 mg/dL 71.8  
Albumin, Urine Random mg/L <12.0  
Albumin/Creat Ratio <30 mg/g <17  
Hemoglobin A1C 4.3 - 5.6 % 12.2   
(H)  
Estimated Average Glucose mg/dL   
303  
  
Component Latest Ref Rng & Units   
2022  
GLUCOSE UA (POCT) Negative mg/dL   
>=1000 (A)  
BILIRUBIN UA (POCT) Negative   
Negative  
KETONE UA (POCT) Negative mg/dL   
Negative  
SPECIFIC GRAVITY UA (POCT) 1.005   
- 1.030 1.020  
HEMOGLOBIN/BLOOD UA (POCT)   
Negative Large (A)  
PH UA (POCT) 4.5 - 8.0 6.0  
PROTEIN UA (POCT) Negative mg/dL   
100 (A)  
UROBILINOGEN UA (POCT) Normal   
E.U./dL 1.0  
NITRITE UA (POCT) Negative   
Negative  
LEUKOCYTES UA (POCT) Negative   
Negative  
COLOR UA (POCT) Dark yellow  
CLARITY UA (POCT) Cloudy  
  
ASSESSMENT/PLAN:  
1. Uncontrolled type 2 diabetes   
mellitus with hyperglycemia   
(HCC) - ICD9: 250.02, ICD10:   
E11.65 (primary diagnosis)  
- Uncontrolled. Discussed that   
her urinary symptoms are   
secondary to her poorly   
controlled diabetes. I will have   
her restart metformin as she had   
stopped for the past week. Had a   
lengthy discussion that she   
should consider an injectable   
Ozempic or Trulicity, but she   
states that she will not be   
agreeable to anything with a   
needle. I discussed that her   
options are limited.  
  
2. Urinary frequency - ICD9:   
788.41, ICD10: R35.0  
- No UTI today. Glucose, protein   
and blood present (patient is on   
her menses). Discussing that   
this is secondary to her   
diabetes that is not well   
controlled  
- UA DIP, URINE (POC)  
- URINALYSIS, WITH MICROSCOPIC  
  
3. Migraine without aura and   
without status migrainosus, not   
intractable - ICD9: 346.10,   
ICD10: G43.009  
- History  
- KETOROLAC 60 MG/2 ML   
INTRAMUSCULAR SOLUTION  
  
BOLIVAR Reinoso.CNP  
  
RTO as needed. Keep upcoming   
visits.  
  
This note was partly generated   
using Dragon voice recognition   
dictation and may contain some   
misspelled or inaccurate words   
missed on review.  
  
  
  
Electronically signed by Raj Gant APRN.CNP at 2022   
12:50 PM EDTdocumented in this   
encounter                               [facility]  
   
                                                    2022 Miscellaneous   
Notes                                     
  
  
Formatting of this note might be   
different from the original.  
Patient calls and notified of   
results and providers   
instructions. Patient verbalizes   
understanding.  
  
Ml Jain RN  
  
  
  
Electronically signed by Ml Jain RN at 2022   
3:13 PM EDT  
  
  
Formatting of this note might be   
different from the original.  
Phoned patient and VM left for   
her to return call and speak   
with a nurse to be updated with   
results and recommendations.  
  
  
Electronically signed by Chandni Dailey LPN at 2022 3:08   
PM EDT  
  
  
Formatting of this note might be   
different from the original.  
----- Message from Dru Lam MD sent at 2022   
9:34 AM EDT -----  
Urine studies are normal.  
  
Diabetes uncontrolled as   
expected based on her home   
readings. A1C now 12.2.   
recommend she follow up with   
diabetes structured program and   
continue medications and   
diabetic diet as discussed.  
  
Other labs unremarkable. Will   
discuss further at follow up in   
July.  
  
  
Electronically signed by Chandni Dailey LPN at 2022 3:07   
PM EDTdocumented in this   
encounter                               [facility]  
   
                                                    2022 Miscellaneous   
Notes                                     
  
  
Formatting of this note might be   
different from the original.  
Pt call to state the pharmacy   
told her she needed a PA for the   
Dexcom. I called the pharmacy &   
spoke to Jacinto, the pharmacist &   
he states the problem is the Rx   
was written for a Dexcom sensor   
&amp;  G4-G5. That model   
is no longer avail, it is now a   
Dexcom G6. New Rx pending.  
  
Chandni Paez LPN  
  
  
  
Electronically signed by Chandni Paez LPN at 2022 4:25   
PM EDTdocumented in this   
encounter                               [facility]  
   
                                                    2022 Miscellaneous   
Notes                                     
  
  
Formatting of this note might be   
different from the original.  
Pt sent [employer] message   
notifying her supplies have been   
sent into [employer] on 22.   
Update office is issues.  
  
Karen Jonas Ma  
  
  
  
Electronically signed by Karen Jonas Ma at 2022 12:29 PM   
EDT  
  
  
Formatting of this note might be   
different from the original.  
Patient calling to check the   
status of her refill. Patient   
would like a return call when   
complete. If Patient does not   
answer, ok to leave message.  
  
  
Electronically signed by Kath Arizmendi at 2022 12:07 PM   
EDT  
  
  
Formatting of this note is   
different from the original.  
Patient has been identified by   
name and date of birth: Yes  
Patient phones for refill(s):  
Pending Prescriptions Disp   
Refills  
DEXCOM G5-G4 SENSOR DEVICE 4   
Each 4  
Sig: Take blood sugar 4 times a   
day  
SANA: No  
DEXCOM G5  1 Each 4  
Sig: Check blood sugar 4 times a   
day  
SANA: No  
  
  
Date of last office visit in   
primary care: 3/22/22  
  
Please advise. Thank you.   
Neisha Lamb LPN  
  
  
  
  
Electronically signed by   
Neisha Lamb LPN at   
2022 5:24 PM EDTdocumented   
in this encounter                       [facility]  
   
                                                    2022 History of Presen  
t   
illness Narrative                         
  
  
Formatting of this note is   
different from the original.  
3/22/2022  
Patient presents with:  
Diarrhea  
  
SUBJECTIVE: This is a 31 year   
old that is here today for Above   
Complaints.  
  
Patient needs a paper needs to   
use bathroom frequently. She   
reports ever since she has been   
on metformin she has had loose   
stools. She is unable to tell me   
how often she is having loose   
stools.  
  
PAST MEDICAL HISTORY  
Diagnosis Date  
Anemia  
Anxiety  
Autism spectrum  
Bipolar I disorder, most recent   
episode (or current) manic, mild   
(HCC)  
Diabetes type 2, controlled   
([facility])  
Fragile x chromosome  
Herpes labialis  
Oral herpes  
History of epilepsy  
History of migraine  
History of tobacco use  
Other and unspecified ovarian   
cyst  
Ovarian cyst  
PTSD (post-traumatic stress   
disorder)  
Unspecified , without   
mention of complication,   
unspecified  
6 Miscarriages  
Unspecified asthma(493.90)  
Urge incontinence  
Korey Saleem  
  
ALLERGIES Ambien [Zolpidem],   
Latex, and Sulfa (Sulfonamide   
Antibiotics)  
MEDICATIONS  
Current Outpatient Medications  
Medication Sig  
desmopressin acetate (DDAVP) 0.2   
mg tablet Take 1 tablet by mouth   
once daily.  
glipiZIDE (GLUCOTROL XL) 5 mg 24   
hr tablet Take 1 tablet by mouth   
once daily.  
propranolol (INDERAL) 20 mg   
tablet Take 1 tablet by mouth   
twice daily.  
folic acid 1 mg tablet Take 2   
tablets by mouth once daily.  
SUMAtriptan (IMITREX) 100 mg   
tablet Take 1 tablet by mouth as   
needed.  
sertraline (ZOLOFT) 50 mg tablet   
Take 1 tablet by mouth once   
daily.  
metFORMIN (GLUCOPHAGE) 1,000 mg   
tablet Take 1 tablet by mouth   
twice daily with meals.  
methylPREDNISolone (MEDROL,   
ISIAH,) 4 mg Dose-Pack Take by   
mouth per package instructions  
Blood-Glucose Sensor (DEXCOM   
G5-G4 SENSOR) anderson Take blood   
sugar 4 times a day  
Blood-Glucose Meter,Continuous   
(DEXCOM G5 ) misc Check   
blood sugar 4 times a day  
naproxen (NAPROSYN) 500 mg   
tablet Take 1 tablet by mouth   
twice daily as needed for Pain.   
Take with food.  
Diaper,Brief, Adult,Disposable   
(ADULT BRIEFS - MEDIUM) 1 brief   
at night  
oxybutynin ER (DITROPAN XL) 10   
mg 24 hr tablet Take 1 tablet by   
mouth once daily.  
albuterol HFA (VENTOLIN HFA) 90   
mcg/actuation inhaler Inhale 2   
Puffs as instructed every 4   
hours as needed for   
Wheezing/Shortness of Breath.  
blood sugar diagnostic (BLOOD   
GLUCOSE TEST) test strip Test   
blood sugar(s) 1-2 times daily.   
Dx: Type 2 DM - Controlled E11.9   
Insulin: No  
L.acid/L.casei/B.bif/B.melinda/FOS   
(PROBIOTIC BLEND ORAL) Take by   
mouth.  
prenatal 25/iron fum/folic/dha   
(PRENATAL-1 ORAL) Take by mouth.  
lamoTRIgine (LAMICTAL) 100 mg   
tablet Take 1 tablet by mouth   
once daily.  
Diaper,Brief, Adult,Disposable   
(ADULT BRIEFS - MEDIUM) misc 1   
application once daily. Patient   
needs pull-up type, no tabs  
1 Application once daily  
acetaminophen (TYLENOL) 500 mg   
tablet Take 500 mg by mouth   
every 8 hours as needed.  
  
No current facility-administered   
medications for this visit.  
  
Medications and allergies   
reviewed by this provider.  
SOCIAL HISTORY  
Social History  
  
Tobacco Use  
Smoking status: Current Every   
Day Smoker  
Packs/day: 0.50  
Years: 10.00  
Pack years: 5.00  
Types: Cigarettes  
Last attempt to quit: 2009  
Years since quittin.7  
Smokeless tobacco: Never Used  
Tobacco comment: 5-7 ciggs/day  
Vaping Use  
Vaping Use: Never used  
Substance Use Topics  
Alcohol use: Yes  
Comment: rarely  
Drug use: No  
  
REVIEW OF SYSTEMS  
All other reviewed and negative   
other than HPI.  
  
OBJECTIVE:  
LMP 2021 . Vital signs   
reviewed by this provider.  
APPEARANCE Well appearing,   
alert, in no acute distress,   
well-hydrated, well nourished.   
Able to answer all questions   
without difficulty.  
  
COVID-19 VACCINE(1) Never done  
DILATED RETINAL EXAM due on   
2021  
LDL CHOLESTEROL due on   
2021  
URINE ALBUMIN:CREATININE RATIO   
due on 2021  
INFLUENZA(1) due on 2021  
HBA1C due on 2021  
DIABETIC FOOT EXAM due on   
2022  
ANNUAL PCP TEAM CHRONIC DISEASE   
VISIT due on 2023  
DEPRESSION SCREENING due on   
2023  
PAP TESTING due on 2025  
HPV TESTING due on 2025  
DTAP,TDAP,TD(3 - Td or Tdap) due   
on 2030  
ONE PNEUMOVAX PRIOR TO AGE 65   
Completed  
HEPATITIS C SCREENING Completed  
HIV SCREENING Completed  
MENINGOCOCCAL CONJUGATE Aged Out  
SPIROMETRY Discontinued  
  
  
ASSESSMENT/PLAN:  
1. Uncontrolled type 2 diabetes   
mellitus with hyperglycemia   
(HCC) - ICD9: 250.02, ICD10:   
E11.65 (primary diagnosis)  
- discussed with patient she   
needs to complete blood work and   
make office appointment for   
follow-up, verbalizes   
understanding  
- will change metformin to   
extended release to see if this   
will help with loose stools- too   
note patient has been on   
metformin for a few years  
- HGB A1C  
- COMP METABOLIC PANEL  
- METFORMIN  MG   
TABLET,EXTENDED RELEASE 24 HR  
  
2. Screening for hyperlipidemia   
- ICD9: V77.91, ICD10: Z13.220  
- LIPID PANEL BASIC  
  
3. Loose stools - ICD9: 787.7,   
ICD10: R19.5  
- increase fiber in diet  
- change metformin to extended   
release to see if this helps  
- METFORMIN  MG   
TABLET,EXTENDED RELEASE 24 HR  
- needs office appointment if   
persists  
- letter provided for employer  
  
Natalai Ospina APRN.CNP  
  
Prescription instructions   
reviewed with patient as   
applicable. Patient advised if   
symptoms do not improve or if   
symptoms worsen sooner, to   
contact their primary care   
physician. Potential red flag   
symptoms discussed with the   
patient. Reviewed appropriate   
action plan to take if red flag   
symptoms occur. Patient   
agreeable to treatment plan.  
  
  
  
  
  
  
Electronically signed by Natalia Ospina APRN.CNP at 2022   
2:25 PM EDTdocumented in this   
encounter                               [facility]  
  
  
  
                                          
   
                                          
   
                                          
   
                                          
   
                                          
   
                                          
   
                                          
   
                                          
  
documented as of this encounter (statuses as of 2022)  
[facility]- History of Past illness Narrative*   
  
                                Problem         Noted Date      Resolved Date  
   
                                Asthmatic bronchitis 2018  
   
                                Anemia, unspecified 2008  
   
                                                      
  
  
Overview:  
  
  
Formatting of this note might be different from the original.  
Started on iron otc  (she's not sure of exact date)  
Unable to tolerate being off of it (severe fatigue reported)   
   
                                Continuous tobacco abuse 2008  
020  
   
                                                      
  
  
Overview:  
  
  
Formatting of this note might be different from the original.  
Unable to guess how much she smokes   
   
                                Tobacco use                     2020  
  
documented as of this encounter (statuses as of 2022)  
[facility]- History of Past illness Narrative*   
  
                                Problem         Noted Date      Resolved Date  
   
                                Asthmatic bronchitis 2018  
   
                                Anemia, unspecified 2008  
   
                                                      
  
  
Overview:  
  
  
Formatting of this note might be different from the original.  
Started on iron otc  (she's not sure of exact date)  
Unable to tolerate being off of it (severe fatigue reported)   
   
                                Continuous tobacco abuse 2008  
020  
   
                                                      
  
  
Overview:  
  
  
Formatting of this note might be different from the original.  
Unable to guess how much she smokes   
   
                                Tobacco use                     2020  
  
documented as of this encounter (statuses as of 2022)  
[facility]- History of Past illness Narrative*   
  
                                Problem         Noted Date      Resolved Date  
   
                                Asthmatic bronchitis 2018  
   
                                Anemia, unspecified 2008  
   
                                                      
  
  
Overview:  
  
  
Formatting of this note might be different from the original.  
Started on iron otc  (she's not sure of exact date)  
Unable to tolerate being off of it (severe fatigue reported)   
   
                                Continuous tobacco abuse 2008  
020  
   
                                                      
  
  
Overview:  
  
  
Formatting of this note might be different from the original.  
Unable to guess how much she smokes   
   
                                Tobacco use                     2020  
  
documented as of this encounter (statuses as of 2022)  
[facility]- History of Past illness Narrative*   
  
                                Problem         Noted Date      Resolved Date  
   
                                Asthmatic bronchitis 2018  
   
                                Anemia, unspecified 2008  
   
                                                      
  
  
Overview:  
  
  
Formatting of this note might be different from the original.  
Started on iron otc  (she's not sure of exact date)  
Unable to tolerate being off of it (severe fatigue reported)   
   
                                Continuous tobacco abuse 2008  
020  
   
                                                      
  
  
Overview:  
  
  
Formatting of this note might be different from the original.  
Unable to guess how much she smokes   
   
                                Tobacco use                     2020  
  
documented as of this encounter (statuses as of 2022)  
[facility]- History of Past illness Narrative*   
  
                                Problem         Noted Date      Resolved Date  
   
                                Asthmatic bronchitis 2018  
   
                                Anemia, unspecified 2008  
   
                                                      
  
  
Overview:  
  
  
Formatting of this note might be different from the original.  
Started on iron otc  (she's not sure of exact date)  
Unable to tolerate being off of it (severe fatigue reported)   
   
                                Continuous tobacco abuse 2008  
020  
   
                                                      
  
  
Overview:  
  
  
Formatting of this note might be different from the original.  
Unable to guess how much she smokes   
   
                                Tobacco use                     2020  
  
documented as of this encounter (statuses as of 2022)  
[facility]- History of Past illness Narrative*   
  
                                Problem         Noted Date      Resolved Date  
   
                                Asthmatic bronchitis 2018  
   
                                Anemia, unspecified 2008  
   
                                                      
  
  
Overview:  
  
  
Formatting of this note might be different from the original.  
Started on iron otc  (she's not sure of exact date)  
Unable to tolerate being off of it (severe fatigue reported)   
   
                                Continuous tobacco abuse 2008  
020  
   
                                                      
  
  
Overview:  
  
  
Formatting of this note might be different from the original.  
Unable to guess how much she smokes   
   
                                Tobacco use                     2020  
  
documented as of this encounter (statuses as of 07/15/2022)  
[facility]- History of Past illness Narrative*   
  
                                Problem         Noted Date      Resolved Date  
   
                                Asthmatic bronchitis 2018  
   
                                Anemia, unspecified 2008  
   
                                                      
  
  
Overview:  
  
  
Formatting of this note might be different from the original.  
Started on iron otc  (she's not sure of exact date)  
Unable to tolerate being off of it (severe fatigue reported)   
   
                                Continuous tobacco abuse 2008  
020  
   
                                                      
  
  
Overview:  
  
  
Formatting of this note might be different from the original.  
Unable to guess how much she smokes   
   
                                Tobacco use                     2020  
  
documented as of this encounter (statuses as of 2022)  
[facility]- History of Past illness Narrative*   
  
                                Problem         Noted Date      Resolved Date  
   
                                Asthmatic bronchitis 2018  
   
                                Anemia, unspecified 2008  
   
                                                      
  
  
Overview:  
  
  
Formatting of this note might be different from the original.  
Started on iron otc  (she's not sure of exact date)  
Unable to tolerate being off of it (severe fatigue reported)   
   
                                Continuous tobacco abuse 2008  
020  
   
                                                      
  
  
Overview:  
  
  
Formatting of this note might be different from the original.  
Unable to guess how much she smokes   
   
                                Tobacco use                     2020  
  
documented as of this encounter (statuses as of 2022)  
[facility]- History of Past illness Narrative*   
  
                                Problem         Noted Date      Resolved Date  
   
                                Asthmatic bronchitis 2018  
   
                                Anemia, unspecified 2008  
   
                                                      
  
  
Overview:  
  
  
Formatting of this note might be different from the original.  
Started on iron otc  (she's not sure of exact date)  
Unable to tolerate being off of it (severe fatigue reported)   
   
                                Continuous tobacco abuse 2008  
020  
   
                                                      
  
  
Overview:  
  
  
Formatting of this note might be different from the original.  
Unable to guess how much she smokes   
   
                                Tobacco use                     2020  
  
documented as of this encounter (statuses as of 2022)  
[facility]- History of Past illness Narrative*   
  
                                Problem         Noted Date      Resolved Date  
   
                                Asthmatic bronchitis 2018  
   
                                Anemia, unspecified 2008  
   
                                                      
  
  
Overview:  
  
  
Formatting of this note might be different from the original.  
Started on iron otc  (she's not sure of exact date)  
Unable to tolerate being off of it (severe fatigue reported)   
   
                                Continuous tobacco abuse 2008  
020  
   
                                                      
  
  
Overview:  
  
  
Formatting of this note might be different from the original.  
Unable to guess how much she smokes   
   
                                Tobacco use                     2020  
  
documented as of this encounter (statuses as of 2022)  
[facility]- History of Past illness Narrative*   
  
                                Problem         Noted Date      Resolved Date  
   
                                Asthmatic bronchitis 2018  
   
                                Anemia, unspecified 2008  
   
                                                      
  
  
Overview:Formatting of this note might be different from the original.  
Started on iron otc  (she's not sure of exact date)  
Unable to tolerate being off of it (severe fatigue reported)  
   
   
                                Continuous tobacco abuse 2008  
020  
   
                                                      
  
  
Overview:Formatting of this note might be different from the original.  
Unable to guess how much she smokes  
   
   
                                Tobacco use                     2020  
  
documented as of this encounter (statuses as of 10/17/2022)  
[facility]- History of Past illness Narrative*   
  
                                Problem         Noted Date      Resolved Date  
   
                                Asthmatic bronchitis 2018  
   
                                Anemia, unspecified 2008  
   
                                                      
  
  
Overview:Formatting of this note might be different from the original.  
Started on iron otc  (she's not sure of exact date)  
Unable to tolerate being off of it (severe fatigue reported)  
   
   
                                Continuous tobacco abuse 2008  
020  
   
                                                      
  
  
Overview:Formatting of this note might be different from the original.  
Unable to guess how much she smokes  
   
   
                                Tobacco use                     2020  
  
documented as of this encounter (statuses as of 10/17/2022)  
[facility]- History of Past illness Narrative*   
  
                                Problem         Noted Date      Resolved Date  
   
                                Asthmatic bronchitis 2018  
   
                                Anemia, unspecified 2008  
   
                                                      
  
  
Overview:Formatting of this note might be different from the original.  
Started on iron otc  (she's not sure of exact date)  
Unable to tolerate being off of it (severe fatigue reported)  
   
   
                                Continuous tobacco abuse 2008  
020  
   
                                                      
  
  
Overview:Formatting of this note might be different from the original.  
Unable to guess how much she smokes  
   
   
                                Tobacco use                     2020  
  
documented as of this encounter (statuses as of 10/17/2022)  
[facility]- History of Past illness Narrative*   
  
                                Problem         Noted Date      Resolved Date  
   
                                Asthmatic bronchitis 2018  
   
                                Anemia, unspecified 2008  
   
                                                      
  
  
Overview:Formatting of this note might be different from the original.  
Started on iron otc  (she's not sure of exact date)  
Unable to tolerate being off of it (severe fatigue reported)  
   
   
                                Continuous tobacco abuse 2008  
020  
   
                                                      
  
  
Overview:Formatting of this note might be different from the original.  
Unable to guess how much she smokes  
   
   
                                Tobacco use                     2020  
  
documented as of this encounter (statuses as of 10/18/2022)  
[facility]- History of Past illness Narrative*   
  
                                Problem         Noted Date      Resolved Date  
   
                                Asthmatic bronchitis 2018  
   
                                Anemia, unspecified 2008  
   
                                                      
  
  
Overview:Formatting of this note might be different from the original.  
Started on iron otc  (she's not sure of exact date)  
Unable to tolerate being off of it (severe fatigue reported)  
   
   
                                Continuous tobacco abuse 2008  
020  
   
                                                      
  
  
Overview:Formatting of this note might be different from the original.  
Unable to guess how much she smokes  
   
   
                                Tobacco use                     2020  
  
documented as of this encounter (statuses as of 10/19/2022)  
[facility]- History of Past illness Narrative*   
  
                                Problem         Noted Date      Resolved Date  
   
                                Asthmatic bronchitis 2018  
   
                                Anemia, unspecified 2008  
   
                                                      
  
  
Overview:Formatting of this note might be different from the original.  
Started on iron otc  (she's not sure of exact date)  
Unable to tolerate being off of it (severe fatigue reported)  
   
   
                                Continuous tobacco abuse 2008  
020  
   
                                                      
  
  
Overview:Formatting of this note might be different from the original.  
Unable to guess how much she smokes  
   
   
                                Tobacco use                     2020  
  
documented as of this encounter (statuses as of 2022)  
[facility]- History of Past illness Narrative*   
  
                                Problem         Noted Date      Resolved Date  
   
                                Asthmatic bronchitis 2018  
   
                                Anemia, unspecified 2008  
   
                                                      
  
  
Overview:Formatting of this note might be different from the original.  
Started on iron otc  (she's not sure of exact date)  
Unable to tolerate being off of it (severe fatigue reported)  
   
   
                                Continuous tobacco abuse 2008  
020  
   
                                                      
  
  
Overview:Formatting of this note might be different from the original.  
Unable to guess how much she smokes  
   
   
                                Tobacco use                     2020  
  
documented as of this encounter (statuses as of 2023)  
[facility]- History of Past illness Narrative*   
  
                                Problem         Noted Date      Resolved Date  
   
                                Asthmatic bronchitis 2018  
   
                                Anemia, unspecified 2008  
   
                                                      
  
  
Overview:Formatting of this note might be different from the original.  
Started on iron otc  (she's not sure of exact date)  
Unable to tolerate being off of it (severe fatigue reported)  
   
   
                                Continuous tobacco abuse 2008  
020  
   
                                                      
  
  
Overview:Formatting of this note might be different from the original.  
Unable to guess how much she smokes  
   
   
                                Tobacco use                     2020  
  
documented as of this encounter (statuses as of 2023)  
[facility]- History of Past illness Narrative*   
  
                                Problem         Noted Date      Resolved Date  
   
                                Asthmatic bronchitis 2018  
   
                                Anemia, unspecified 2008  
   
                                                      
  
  
Overview:Formatting of this note might be different from the original.  
Started on iron otc  (she's not sure of exact date)  
Unable to tolerate being off of it (severe fatigue reported)  
   
   
                                Continuous tobacco abuse 2008  
020  
   
                                                      
  
  
Overview:Formatting of this note might be different from the original.  
Unable to guess how much she smokes  
   
   
                                Tobacco use                     2020  
  
documented as of this encounter (statuses as of 2023)  
[facility]- History of Past illness Narrative*   
  
                                Problem         Noted Date      Resolved Date  
   
                                Asthmatic bronchitis 2018  
   
                                Anemia, unspecified 2008  
   
                                                      
  
  
Overview:Formatting of this note might be different from the original.  
Started on iron otc  (she's not sure of exact date)  
Unable to tolerate being off of it (severe fatigue reported)  
   
   
                                Continuous tobacco abuse 2008  
020  
   
                                                      
  
  
Overview:Formatting of this note might be different from the original.  
Unable to guess how much she smokes  
   
   
                                Tobacco use                     2020  
  
documented as of this encounter (statuses as of 2023)  
[facility]- History of Past illness Narrative*   
  
                                Problem         Noted Date      Resolved Date  
   
                                Asthmatic bronchitis 2018  
   
                                Anemia, unspecified 2008  
   
                                                      
  
  
Overview:Formatting of this note might be different from the original.  
Started on iron otc  (she's not sure of exact date)  
Unable to tolerate being off of it (severe fatigue reported)  
   
   
                                Continuous tobacco abuse 2008  
020  
   
                                                      
  
  
Overview:Formatting of this note might be different from the original.  
Unable to guess how much she smokes  
   
   
                                Tobacco use                     2020  
  
documented as of this encounter (statuses as of 2023)  
[facility]- History of Past illness Narrative*   
  
                                Problem         Noted Date      Resolved Date  
   
                                Asthmatic bronchitis 2018  
   
                                Anemia, unspecified 2008  
   
                                                      
  
  
Overview:Formatting of this note might be different from the original.  
Started on iron otc  (she's not sure of exact date)  
Unable to tolerate being off of it (severe fatigue reported)  
   
   
                                Continuous tobacco abuse 2008  
020  
   
                                                      
  
  
Overview:Formatting of this note might be different from the original.  
Unable to guess how much she smokes  
   
   
                                Tobacco use                     2020  
  
documented as of this encounter (statuses as of 2023)  
[facility]- History of Past illness Narrative*   
  
                                Problem         Noted Date      Resolved Date  
   
                                Asthmatic bronchitis 2018  
   
                                Anemia, unspecified 2008  
   
                                                      
  
  
Overview:Formatting of this note might be different from the original.  
Started on iron otc  (she's not sure of exact date)  
Unable to tolerate being off of it (severe fatigue reported)  
   
   
                                Continuous tobacco abuse 2008  
020  
   
                                                      
  
  
Overview:Formatting of this note might be different from the original.  
Unable to guess how much she smokes  
   
   
                                Tobacco use                     2020  
  
documented as of this encounter (statuses as of 2023)  
[facility]- History of Past illness Narrative*   
  
                                Problem         Noted Date      Resolved Date  
   
                                Asthmatic bronchitis 2018  
   
                                Anemia, unspecified 2008  
   
                                                      
  
  
Overview:Formatting of this note might be different from the original.  
Started on iron otc  (she's not sure of exact date)  
Unable to tolerate being off of it (severe fatigue reported)  
   
   
                                Continuous tobacco abuse 2008  
020  
   
                                                      
  
  
Overview:Formatting of this note might be different from the original.  
Unable to guess how much she smokes  
   
   
                                Tobacco use                     2020  
  
documented as of this encounter (statuses as of 2023)  
[facility]- History of Past illness Narrative*   
  
                                Problem         Noted Date      Resolved Date  
   
                                Asthmatic bronchitis 2018  
   
                                Anemia, unspecified 2008  
   
                                                      
  
  
Overview:Formatting of this note might be different from the original.  
Started on iron otc  (she's not sure of exact date)  
Unable to tolerate being off of it (severe fatigue reported)  
   
   
                                Continuous tobacco abuse 2008  
020  
   
                                                      
  
  
Overview:Formatting of this note might be different from the original.  
Unable to guess how much she smokes  
   
   
                                Tobacco use                     2020  
  
documented as of this encounter (statuses as of 2023)  
[facility]- History of Past illness Narrative*   
  
                                Problem         Noted Date      Resolved Date  
   
                                Asthmatic bronchitis 2018  
   
                                Anemia, unspecified 2008  
   
                                                      
  
  
Overview:Formatting of this note might be different from the original.  
Started on iron otc  (she's not sure of exact date)  
Unable to tolerate being off of it (severe fatigue reported)  
   
   
                                Continuous tobacco abuse 2008  
020  
   
                                                      
  
  
Overview:Formatting of this note might be different from the original.  
Unable to guess how much she smokes  
   
   
                                Tobacco use                     2020  
  
documented as of this encounter (statuses as of 2023)  
[facility]- History of Past illness Narrative*   
  
                          Problem      Noted Date   Diagnosed Date Resolved Date  
   
                          Asthmatic bronchitis 2018  
020  
   
                          Anemia, unspecified 2008  
20  
   
                                                      
  
  
Overview:Formatting of this note might be different from the original.  
Started on iron otc  (she's not sure of exact date)  
Unable to tolerate being off of it (severe fatigue reported)  
   
   
                          Continuous tobacco abuse 2008  
   
                                                      
  
  
Overview:Formatting of this note might be different from the original.  
Unable to guess how much she smokes  
   
   
                          Tobacco use                            2020  
  
documented as of this encounter (statuses as of 2023)  
[facility]- History of Past illness Narrative*   
  
                          Problem      Noted Date   Diagnosed Date Resolved Date  
   
                          Asthmatic bronchitis 2018  
020  
   
                          Anemia, unspecified 2008  
20  
   
                                                      
  
  
Overview:Formatting of this note might be different from the original.  
Started on iron otc  (she's not sure of exact date)  
Unable to tolerate being off of it (severe fatigue reported)  
   
   
                          Continuous tobacco abuse 2008  
   
                                                      
  
  
Overview:Formatting of this note might be different from the original.  
Unable to guess how much she smokes  
   
   
                          Tobacco use                            2020  
  
documented as of this encounter (statuses as of 2024)  
[facility]- History of Past illness Narrative*   
  
                          Problem      Noted Date   Diagnosed Date Resolved Date  
   
                          Asthmatic bronchitis 2018  
020  
   
                          Anemia, unspecified 2008  
20  
   
                                                      
  
  
Overview:Formatting of this note might be different from the original.  
Started on iron otc  (she's not sure of exact date)  
Unable to tolerate being off of it (severe fatigue reported)  
   
   
                          Continuous tobacco abuse 2008  
   
                                                      
  
  
Overview:Formatting of this note might be different from the original.  
Unable to guess how much she smokes  
   
   
                          Tobacco use                            2020  
  
documented as of this encounter (statuses as of 2024)  
[facility]  
  
No data available for this section  
  
[facility]  
Work Phone: [phone] note*   
  
                                                    Diagnosis  
   
                                                      
  
  
Uncontrolled type 2 diabetes mellitus with hyperglycemia (HCC)- Primary  
   
                                                      
  
  
Screening for hyperlipidemia  
  
  
Screening for lipoid disorders  
   
                                                      
  
  
Loose stools  
  
  
Abnormal feces  
  
documented in this encounter  
[facility]   
  
                                                    Diagnosis  
   
                                                      
  
  
Uncontrolled type 2 diabetes mellitus with hyperglycemia (HCC)  
  
documented in this encounter  
[facility]   
  
                                                    Diagnosis  
   
                                                      
  
  
Uncontrolled type 2 diabetes mellitus with hyperglycemia (HCC)- Primary  
  
documented in this encounter  
[facility]   
  
                                                    Diagnosis  
   
                                                      
  
  
Uncontrolled type 2 diabetes mellitus with hyperglycemia (HCC)- Primary  
   
                                                      
  
  
Urinary frequency  
   
                                                      
  
  
Migraine without aura and without status migrainosus, not intractable  
  
  
Migraine without aura, without mention of intractable migraine without mention 
of   
status migrainosus  
  
documented in this encounter  
[facility]   
  
                                                    Diagnosis  
   
                                                      
  
  
OPENED IN ERROR- Primary  
  
  
To allow closing an encounter opened in error (used in SmartSet)  
  
documented in this encounter  
[facility]   
  
                                                    Diagnosis  
   
                                                      
  
  
Urinary frequency- Primary  
   
                                                      
  
  
Acute vaginitis  
  
  
Vaginitis and vulvovaginitis, unspecified  
  
documented in this encounter  
[facility]   
  
                                                    Diagnosis  
   
                                                      
  
  
Nocturnal enuresis  
  
documented in this encounter  
[facility]   
  
                                                    Diagnosis  
   
                                                      
  
  
Nocturnal enuresis- Primary  
  
documented in this encounter  
[facility]   
  
                                                    Diagnosis  
   
                                                      
  
  
Fall, initial encounter- Primary  
  
documented in this encounter  
[facility]   
  
                                                    Diagnosis  
   
                                                      
  
  
Closed fracture of one rib of right side with routine healing, subsequent 
encounter-   
Primary  
   
                                                      
  
  
Uncontrolled type 2 diabetes mellitus with hyperglycemia ([facility])  
   
                                                      
  
  
Psychogenic nonepileptic seizure  
   
                                                      
  
  
Bipolar affective disorder, remission status unspecified ([facility])  
  
documented in this encounter  
[facility]   
  
                                                    Diagnosis  
   
                                                      
  
  
Urgency of urination- Primary  
  
documented in this encounter  
[facility] note*   
  
                                                    Diagnosis  
   
                                                      
  
  
Acute cystitis without hematuria- Primary  
  
  
Acute cystitis  
   
                                                      
  
  
Candida glabrata infection  
  
  
Candidiasis of unspecified site  
   
                                                      
  
  
Poorly controlled diabetes mellitus (HCC)  
  
  
Type II or unspecified type diabetes mellitus without mention of complication, 
not   
stated as uncontrolled  
   
                                                      
  
  
Allergy to sulfa drugs  
  
  
Other drug allergy  
  
documented in this encounter  
[facility]   
  
                                                    Diagnosis  
   
                                                      
  
  
OAB (overactive bladder)- Primary  
  
  
Hypertonicity of bladder  
   
                                                      
  
  
Enuresis  
  
  
Unspecified urinary incontinence  
  
documented in this encounter  
[facility]   
  
                                                    Diagnosis  
   
                                                      
  
  
Vaginal itching- Primary  
  
  
Pruritus of genital organs  
   
                                                      
  
  
Chronic right shoulder pain  
  
  
Pain in joint, shoulder region  
  
documented in this encounter  
[facility]   
  
                                                    Diagnosis  
   
                                                      
  
  
Nocturnal enuresis- Primary  
   
                                                      
  
  
Urinary urgency  
  
  
Urgency of urination  
  
documented in this encounter  
[facility]   
  
                                                    Diagnosis  
   
                                                      
  
  
Headache, unspecified headache type- Primary  
  
documented in this encounter  
[facility]   
  
                                                    Diagnosis  
   
                                                      
  
  
OAB (overactive bladder)  
  
  
Hypertonicity of bladder  
  
documented in this encounter  
[facility]   
  
                                                    Diagnosis  
   
                                                      
  
  
Pharyngitis, unspecified etiology- Primary  
   
                                                      
  
  
Viral illness  
  
  
Unspecified viral infection, in conditions classified elsewhere and of 
unspecified   
site  
  
documented in this encounter  
[facility] Outpatient Procedure (Routine) 
  - Pending Review  
  
                          Specialty    Diagnoses / Procedures Referred By Contac  
t Referred To Contact  
   
                                        [facility]    
  
  
Diagnoses  
  
  
Nocturnal enuresis  
  
  
Urinary urgency  
  
  
  
Procedures  
  
  
URODYNAMICS WITH EMG  
  
  
EMG STDS ANAL/URTL   
SPHNCTR OTH/THN NDL                       
  
  
Sara Astorga,   
BOLIVAR.CNP  
  
  
[address] [place] [address]  
  
  
Phone: [phone]  
  
  
Fax: [phone]                         
  
  
[address] [address]  
  
  
  
                          Referral ID  Status       Reason       Start   
Date                                    Expiration   
Date                                    Visits   
Requested                               Visits   
Authorized  
   
                                        [id number]            Pending   
Review                                    
  
  
Auto-Generat  
ed Referral     3/21/2023       3/21/2024       1               1  
  
  
  
  
Electronically signed by Sara JOHNSCNP at 2023 10:14 AM EDT  
  
  
[facility]  
  
Summary Purpose  
  
  
                                                      
  
  
  
Family History  
No Family History Records FoundNo Family History Records FoundNo Family History 
Records FoundNo Family History Records Found  
  
Advance Directives  
No Advanced Directives Records FoundNo Advanced Directives Records FoundNo 
Advanced Directives Records FoundNo Advanced Directives Records Found  
  
Medications Administered Section  
     Inactive Administered Medications - up to 3 most recent administrations  
  
                    Medication Order MAR Action Action Date Dose      Rate        
Site  
   
                                                      
  
  
keTORolac 60 mg injection   
(TORADOL)  
  
  
60 mg, INTRAMUSCULAR, ONCE,   
1 dose, On 22 at   
1200, Ketorolac (Toradol)   
is indicated for the   
short-term (up to 5 days)   
management of moderately   
severe acute pain.   
Continuation of ketorolac   
(Toradol) beyond 5 days   
increases the risk of   
developing serious adverse   
events. Please verify the   
duration of therapy for   
ketorolac (Toradol)., If   
ordered PRN for pain,   
patient/guardian may elect   
to receive this medication   
for higher pain levels   
INSTEAD of the opioid, if   
preferred: Yes            Given                     2022 12:28 PM   
EDT                 60 mg                                   Buttocks, Left  
   
                                                               
  
  
  
Reason for Referral  
  
  
                          Specialty    Diagnoses / Procedures Referred By Contac  
t Referred To Contact  
   
                                        Urology               
  
  
Diagnoses  
  
  
Urinary frequency  
  
  
  
Procedures  
  
  
CONSULT TO UROLOGY  
  
  
OFFICE/OUTPATIENT [facility] 60-74 MINUTES                         
  
  
Marce Luevano APRN.CNP  
  
  
[address] [place] [address]  
  
  
Phone: [phone]  
  
  
Fax: [phone]                         
  
  
  
  
  
                          Referral ID  Status       Reason       Start   
Date                                    Expiration   
Date                                    Visits   
Requested                               Visits   
Authorized  
   
                                        [id number]            Pending   
Review                                    
  
  
PCP Requested   
Referral        2022       1               1  
  
  
  
Additional Source Comments  
  
  
  
                                                    INFORMATION SOURCE (unrecogn  
ized section and content)  
   
                                          
  
  
  
                                          
   
                                          
  
  
  
                                DATE CREATED    AUTHOR          AUTHOR'S ORGANIZ  
ATION  
   
                                2022                      [facility]  
  
  
  
                                DATE CREATED    AUTHOR          AUTHOR'S ORGANIZ  
ATION  
   
                                2022                      [facility]  
[facility]  
  
  
  
                                DATE CREATED    AUTHOR          AUTHOR'S ORGANIZ  
ATION  
   
                                2024                      [facility]  
  
  
  
  
  
                                                    Source Comments (unrecognize  
d section and content)  
   
                                                    In the event this informatio  
n is protected by the Federal Confidentiality of   
Alcohol   
and Drug Abuse Patient Records regulations: This information has been disclosed 
to   
you from records protected by Federal confidentiality rules (42 CFR Part 2). The
   
Federal rules prohibit you from making any further disclosure of this 
information   
unless further disclosure is expressly permitted by the written consent of the 
person   
to whom it pertains or as otherwise permitted by 42 CFR Part 2. A general   
authorization for the release of medical or other information is NOT sufficient 
for   
this purpose. The Federal rules restrict any use of the information to 
criminally   
investigate or prosecute any alcohol or drug abuse patient.[facility] Hospital[facility] 
the   
event this information is protected by the Federal Confidentiality of Alcohol 
and   
Drug Abuse Patient Records regulations: This information has been disclosed to 
you   
from records protected by Federal confidentiality rules (42 CFR Part 2). The 
Federal   
rules prohibit you from making any further disclosure of this information unless
   
further disclosure is expressly permitted by the written consent of the person 
to   
whom it pertains or as otherwise permitted by 42 CFR Part 2. A general 
authorization   
for the release of medical or other information is NOT sufficient for this 
purpose.   
The Federal rules restrict any use of the information to criminally investigate 
or   
prosecute any alcohol or drug abuse patient.[facility] HospitalIn the event this   
information is protected by the Federal Confidentiality of Alcohol and Drug 
Abuse   
Patient Records regulations: This information has been disclosed to you from 
records   
protected by Federal confidentiality rules (42 CFR Part 2). The Federal rules   
prohibit you from making any further disclosure of this information unless 
further   
disclosure is expressly permitted by the written consent of the person to whom 
it   
pertains or as otherwise permitted by 42 CFR Part 2. A general authorization for
 the   
release of medical or other information is NOT sufficient for this purpose. The   
Federal rules restrict any use of the information to criminally investigate or   
prosecute any alcohol or drug abuse patient.[facility] HospitalIn the event this   
information is protected by the Federal Confidentiality of Alcohol and Drug 
Abuse   
Patient Records regulations: This information has been disclosed to you from 
records   
protected by Federal confidentiality rules (42 CFR Part 2). The Federal rules   
prohibit you from making any further disclosure of this information unless 
further   
disclosure is expressly permitted by the written consent of the person to whom 
it   
pertains or as otherwise permitted by 42 CFR Part 2. A general authorization for
 the   
release of medical or other information is NOT sufficient for this purpose. The   
Federal rules restrict any use of the information to criminally investigate or   
prosecute any alcohol or drug abuse patient.[facility] HospitalIn the event this   
information is protected by the Federal Confidentiality of Alcohol and Drug 
Abuse   
Patient Records regulations: This information has been disclosed to you from 
records   
protected by Federal confidentiality rules (42 CFR Part 2). The Federal rules   
prohibit you from making any further disclosure of this information unless 
further   
disclosure is expressly permitted by the written consent of the person to whom 
it   
pertains or as otherwise permitted by 42 CFR Part 2. A general authorization for
 the   
release of medical or other information is NOT sufficient for this purpose. The   
Federal rules restrict any use of the information to criminally investigate or   
prosecute any alcohol or drug abuse patient.[facility] HospitalIn the event this   
information is protected by the Federal Confidentiality of Alcohol and Drug 
Abuse   
Patient Records regulations: This information has been disclosed to you from 
records   
protected by Federal confidentiality rules (42 CFR Part 2). The Federal rules   
prohibit you from making any further disclosure of this information unless 
further   
disclosure is expressly permitted by the written consent of the person to whom 
it   
pertains or as otherwise permitted by 42 CFR Part 2. A general authorization for
 the   
release of medical or other information is NOT sufficient for this purpose. The   
Federal rules restrict any use of the information to criminally investigate or   
prosecute any alcohol or drug abuse patient.[facility] HospitalIn the event this   
information is protected by the Federal Confidentiality of Alcohol and Drug 
Abuse   
Patient Records regulations: This information has been disclosed to you from 
records   
protected by Federal confidentiality rules (42 CFR Part 2). The Federal rules   
prohibit you from making any further disclosure of this information unless 
further   
disclosure is expressly permitted by the written consent of the person to whom 
it   
pertains or as otherwise permitted by 42 CFR Part 2. A general authorization for
 the   
release of medical or other information is NOT sufficient for this purpose. The   
Federal rules restrict any use of the information to criminally investigate or   
prosecute any alcohol or drug abuse patient.[facility] HospitalIn the event this   
information is protected by the Federal Confidentiality of Alcohol and Drug 
Abuse   
Patient Records regulations: This information has been disclosed to you from 
records   
protected by Federal confidentiality rules (42 CFR Part 2). The Federal rules   
prohibit you from making any further disclosure of this information unless 
further   
disclosure is expressly permitted by the written consent of the person to whom 
it   
pertains or as otherwise permitted by 42 CFR Part 2. A general authorization for
 the   
release of medical or other information is NOT sufficient for this purpose. The   
Federal rules restrict any use of the information to criminally investigate or   
prosecute any alcohol or drug abuse patient.[facility] HospitalIn the event this   
information is protected by the Federal Confidentiality of Alcohol and Drug 
Abuse   
Patient Records regulations: This information has been disclosed to you from 
records   
protected by Federal confidentiality rules (42 CFR Part 2). The Federal rules   
prohibit you from making any further disclosure of this information unless 
further   
disclosure is expressly permitted by the written consent of the person to whom 
it   
pertains or as otherwise permitted by 42 CFR Part 2. A general authorization for
 the   
release of medical or other information is NOT sufficient for this purpose. The   
Federal rules restrict any use of the information to criminally investigate or   
prosecute any alcohol or drug abuse patient.[facility] the event this   
information is protected by the Federal Confidentiality of Alcohol and Drug 
Abuse   
Patient Records regulations: This information has been disclosed to you from 
records   
protected by Federal confidentiality rules (42 CFR Part 2). The Federal rules   
prohibit you from making any further disclosure of this information unless 
further   
disclosure is expressly permitted by the written consent of the person to whom 
it   
pertains or as otherwise permitted by 42 CFR Part 2. A general authorization for
 the   
release of medical or other information is NOT sufficient for this purpose. The   
Federal rules restrict any use of the information to criminally investigate or   
prosecute any alcohol or drug abuse patient.[facility] HospitalIn the event this   
information is protected by the Federal Confidentiality of Alcohol and Drug 
Abuse   
Patient Records regulations: This information has been disclosed to you from 
records   
protected by Federal confidentiality rules (42 CFR Part 2). The Federal rules   
prohibit you from making any further disclosure of this information unless 
further   
disclosure is expressly permitted by the written consent of the person to whom 
it   
pertains or as otherwise permitted by 42 CFR Part 2. A general authorization for
 the   
release of medical or other information is NOT sufficient for this purpose. The   
Federal rules restrict any use of the information to criminally investigate or   
prosecute any alcohol or drug abuse patient.[facility] HospitalIn the event this   
information is protected by the Federal Confidentiality of Alcohol and Drug 
Abuse   
Patient Records regulations: This information has been disclosed to you from 
records   
protected by Federal confidentiality rules (42 CFR Part 2). The Federal rules   
prohibit you from making any further disclosure of this information unless 
further   
disclosure is expressly permitted by the written consent of the person to whom 
it   
pertains or as otherwise permitted by 42 CFR Part 2. A general authorization for
 the   
release of medical or other information is NOT sufficient for this purpose. The   
Federal rules restrict any use of the information to criminally investigate or   
prosecute any alcohol or drug abuse patient.[facility] HospitalIn the event this   
information is protected by the Federal Confidentiality of Alcohol and Drug 
Abuse   
Patient Records regulations: This information has been disclosed to you from 
records   
protected by Federal confidentiality rules (42 CFR Part 2). The Federal rules   
prohibit you from making any further disclosure of this information unless 
further   
disclosure is expressly permitted by the written consent of the person to whom 
it   
pertains or as otherwise permitted by 42 CFR Part 2. A general authorization for
 the   
release of medical or other information is NOT sufficient for this purpose. The   
Federal rules restrict any use of the information to criminally investigate or   
prosecute any alcohol or drug abuse patient.[facility] HospitalIn the event this   
information is protected by the Federal Confidentiality of Alcohol and Drug 
Abuse   
Patient Records regulations: This information has been disclosed to you from 
records   
protected by Federal confidentiality rules (42 CFR Part 2). The Federal rules   
prohibit you from making any further disclosure of this information unless 
further   
disclosure is expressly permitted by the written consent of the person to whom 
it   
pertains or as otherwise permitted by 42 CFR Part 2. A general authorization for
 the   
release of medical or other information is NOT sufficient for this purpose. The   
Federal rules restrict any use of the information to criminally investigate or   
prosecute any alcohol or drug abuse patient.[facility] HospitalIn the event this   
information is protected by the Federal Confidentiality of Alcohol and Drug 
Abuse   
Patient Records regulations: This information has been disclosed to you from 
records   
protected by Federal confidentiality rules (42 CFR Part 2). The Federal rules   
prohibit you from making any further disclosure of this information unless 
further   
disclosure is expressly permitted by the written consent of the person to whom 
it   
pertains or as otherwise permitted by 42 CFR Part 2. A general authorization for
 the   
release of medical or other information is NOT sufficient for this purpose. The   
Federal rules restrict any use of the information to criminally investigate or   
prosecute any alcohol or drug abuse patient.[facility] HospitalIn the event this   
information is protected by the Federal Confidentiality of Alcohol and Drug 
Abuse   
Patient Records regulations: This information has been disclosed to you from 
records   
protected by Federal confidentiality rules (42 CFR Part 2). The Federal rules   
prohibit you from making any further disclosure of this information unless 
further   
disclosure is expressly permitted by the written consent of the person to whom 
it   
pertains or as otherwise permitted by 42 CFR Part 2. A general authorization for
 the   
release of medical or other information is NOT sufficient for this purpose. The   
Federal rules restrict any use of the information to criminally investigate or   
prosecute any alcohol or drug abuse patient.[facility] HospitalIn the event this   
information is protected by the Federal Confidentiality of Alcohol and Drug 
Abuse   
Patient Records regulations: This information has been disclosed to you from 
records   
protected by Federal confidentiality rules (42 CFR Part 2). The Federal rules   
prohibit you from making any further disclosure of this information unless 
further   
disclosure is expressly permitted by the written consent of the person to whom 
it   
pertains or as otherwise permitted by 42 CFR Part 2. A general authorization for
 the   
release of medical or other information is NOT sufficient for this purpose. The   
Federal rules restrict any use of the information to criminally investigate or   
prosecute any alcohol or drug abuse patient.[facility] HospitalIn the event this   
information is protected by the Federal Confidentiality of Alcohol and Drug 
Abuse   
Patient Records regulations: This information has been disclosed to you from 
records   
protected by Federal confidentiality rules (42 CFR Part 2). The Federal rules   
prohibit you from making any further disclosure of this information unless 
further   
disclosure is expressly permitted by the written consent of the person to whom 
it   
pertains or as otherwise permitted by 42 CFR Part 2. A general authorization for
 the   
release of medical or other information is NOT sufficient for this purpose. The   
Federal rules restrict any use of the information to criminally investigate or   
prosecute any alcohol or drug abuse patient.[facility] HospitalIn the event this   
information is protected by the Federal Confidentiality of Alcohol and Drug 
Abuse   
Patient Records regulations: This information has been disclosed to you from 
records   
protected by Federal confidentiality rules (42 CFR Part 2). The Federal rules   
prohibit you from making any further disclosure of this information unless 
further   
disclosure is expressly permitted by the written consent of the person to whom 
it   
pertains or as otherwise permitted by 42 CFR Part 2. A general authorization for
 the   
release of medical or other information is NOT sufficient for this purpose. The   
Federal rules restrict any use of the information to criminally investigate or   
prosecute any alcohol or drug abuse patient.[facility] HospitalIn the event this   
information is protected by the Federal Confidentiality of Alcohol and Drug 
Abuse   
Patient Records regulations: This information has been disclosed to you from 
records   
protected by Federal confidentiality rules (42 CFR Part 2). The Federal rules   
prohibit you from making any further disclosure of this information unless 
further   
disclosure is expressly permitted by the written consent of the person to whom 
it   
pertains or as otherwise permitted by 42 CFR Part 2. A general authorization for
 the   
release of medical or other information is NOT sufficient for this purpose. The   
Federal rules restrict any use of the information to criminally investigate or   
prosecute any alcohol or drug abuse patient.[facility] HospitalIn the event this   
information is protected by the Federal Confidentiality of Alcohol and Drug 
Abuse   
Patient Records regulations: This information has been disclosed to you from 
records   
protected by Federal confidentiality rules (42 CFR Part 2). The Federal rules   
prohibit you from making any further disclosure of this information unless 
further   
disclosure is expressly permitted by the written consent of the person to whom 
it   
pertains or as otherwise permitted by 42 CFR Part 2. A general authorization for
 the   
release of medical or other information is NOT sufficient for this purpose. The   
Federal rules restrict any use of the information to criminally investigate or   
prosecute any alcohol or drug abuse patient.[facility] HospitalIn the event this   
information is protected by the Federal Confidentiality of Alcohol and Drug 
Abuse   
Patient Records regulations: This information has been disclosed to you from 
records   
protected by Federal confidentiality rules (42 CFR Part 2). The Federal rules   
prohibit you from making any further disclosure of this information unless 
further   
disclosure is expressly permitted by the written consent of the person to whom 
it   
pertains or as otherwise permitted by 42 CFR Part 2. A general authorization for
 the   
release of medical or other information is NOT sufficient for this purpose. The   
Federal rules restrict any use of the information to criminally investigate or   
prosecute any alcohol or drug abuse patient.[facility] HospitalIn the event this   
information is protected by the Federal Confidentiality of Alcohol and Drug 
Abuse   
Patient Records regulations: This information has been disclosed to you from 
records   
protected by Federal confidentiality rules (42 CFR Part 2). The Federal rules   
prohibit you from making any further disclosure of this information unless 
further   
disclosure is expressly permitted by the written consent of the person to whom 
it   
pertains or as otherwise permitted by 42 CFR Part 2. A general authorization for
 the   
release of medical or other information is NOT sufficient for this purpose. The   
Federal rules restrict any use of the information to criminally investigate or   
prosecute any alcohol or drug abuse patient.[facility] HospitalIn the event this   
information is protected by the Federal Confidentiality of Alcohol and Drug 
Abuse   
Patient Records regulations: This information has been disclosed to you from 
records   
protected by Federal confidentiality rules (42 CFR Part 2). The Federal rules   
prohibit you from making any further disclosure of this information unless 
further   
disclosure is expressly permitted by the written consent of the person to whom 
it   
pertains or as otherwise permitted by 42 CFR Part 2. A general authorization for
 the   
release of medical or other information is NOT sufficient for this purpose. The   
Federal rules restrict any use of the information to criminally investigate or   
prosecute any alcohol or drug abuse patient.[facility] HospitalIn the event this   
information is protected by the Federal Confidentiality of Alcohol and Drug 
Abuse   
Patient Records regulations: This information has been disclosed to you from 
records   
protected by Federal confidentiality rules (42 CFR Part 2). The Federal rules   
prohibit you from making any further disclosure of this information unless 
further   
disclosure is expressly permitted by the written consent of the person to whom 
it   
pertains or as otherwise permitted by 42 CFR Part 2. A general authorization for
 the   
release of medical or other information is NOT sufficient for this purpose. The   
Federal rules restrict any use of the information to criminally investigate or   
prosecute any alcohol or drug abuse patient.[facility] HospitalIn the event this   
information is protected by the Federal Confidentiality of Alcohol and Drug 
Abuse   
Patient Records regulations: This information has been disclosed to you from 
records   
protected by Federal confidentiality rules (42 CFR Part 2). The Federal rules   
prohibit you from making any further disclosure of this information unless 
further   
disclosure is expressly permitted by the written consent of the person to whom 
it   
pertains or as otherwise permitted by 42 CFR Part 2. A general authorization for
 the   
release of medical or other information is NOT sufficient for this purpose. The   
Federal rules restrict any use of the information to criminally investigate or   
prosecute any alcohol or drug abuse patient.[facility] HospitalIn the event this   
information is protected by the Federal Confidentiality of Alcohol and Drug 
Abuse   
Patient Records regulations: This information has been disclosed to you from 
records   
protected by Federal confidentiality rules (42 CFR Part 2). The Federal rules   
prohibit you from making any further disclosure of this information unless 
further   
disclosure is expressly permitted by the written consent of the person to whom 
it   
pertains or as otherwise permitted by 42 CFR Part 2. A general authorization for
 the   
release of medical or other information is NOT sufficient for this purpose. The   
Federal rules restrict any use of the information to criminally investigate or   
prosecute any alcohol or drug abuse patient.[facility] HospitalIn the event this   
information is protected by the Federal Confidentiality of Alcohol and Drug 
Abuse   
Patient Records regulations: This information has been disclosed to you from 
records   
protected by Federal confidentiality rules (42 CFR Part 2). The Federal rules   
prohibit you from making any further disclosure of this information unless 
further   
disclosure is expressly permitted by the written consent of the person to whom 
it   
pertains or as otherwise permitted by 42 CFR Part 2. A general authorization for
 the   
release of medical or other information is NOT sufficient for this purpose. The   
Federal rules restrict any use of the information to criminally investigate or   
prosecute any alcohol or drug abuse patient.[facility] HospitalIn the event this   
information is protected by the Federal Confidentiality of Alcohol and Drug 
Abuse   
Patient Records regulations: This information has been disclosed to you from 
records   
protected by Federal confidentiality rules (42 CFR Part 2). The Federal rules   
prohibit you from making any further disclosure of this information unless 
further   
disclosure is expressly permitted by the written consent of the person to whom 
it   
pertains or as otherwise permitted by 42 CFR Part 2. A general authorization for
 the   
release of medical or other information is NOT sufficient for this purpose. The   
Federal rules restrict any use of the information to criminally investigate or   
prosecute any alcohol or drug abuse patient.[facility] HospitalIn the event this   
information is protected by the Federal Confidentiality of Alcohol and Drug 
Abuse   
Patient Records regulations: This information has been disclosed to you from 
records   
protected by Federal confidentiality rules (42 CFR Part 2). The Federal rules   
prohibit you from making any further disclosure of this information unless 
further   
disclosure is expressly permitted by the written consent of the person to whom 
it   
pertains or as otherwise permitted by 42 CFR Part 2. A general authorization for
 the   
release of medical or other information is NOT sufficient for this purpose. The   
Federal rules restrict any use of the information to criminally investigate or   
prosecute any alcohol or drug abuse patient.[facility]  
  
  
  
  
                                                    Reason for Visit (unrecogniz  
ed section and content)  
   
                                          
  
  
  
                                          
   
                                          
  
  
  
                                Reason          Onset Date      Comments  
   
                                Refill Request  2022        
   
                                Refill Request  2022        
  
  
  
                                        Reason              Comments  
   
                                        Results               
  
  
  
                                        Reason              Comments  
   
                                        Acute Visit           
  
  
  
                                        Reason              Comments  
   
                                        Missed Appointment    
  
  
  
                                        Reason              Comments  
   
                                        Diabetes              
  
  
  
                                        Reason              Comments  
   
                                        Appointment         needs to be seen in   
office. can't address UTI issues via VV.  
  
  
  
                                        Reason              Comments  
   
                                        UTI                 frequency x 2 weeks  
  
  
  
                                        Reason              Comments  
   
                                        Results             + yeast  
  
  
  
                                        Reason              Comments  
   
                                        Results             Urine Cx = E coli  
  
  
  
                                Reason          Onset Date      Comments  
   
                                Refill Request  10/17/2022        
  
  
  
                                        Reason              Comments  
   
                                        Medication Problem    
  
  
  
                                        Reason              Comments  
   
                                        Follow Up             
  
  
  
                                        Reason              Comments  
   
                                        ED Follow-up        [facility] 22 from a   
fall  
  
  
  
                                        Reason              Comments  
   
                                        UTI                 Urgency, frequncy  
  
  
  
                                        Reason              Comments  
   
                                        Appointment           
  
  
  
                                        Reason              Comments  
   
                                        Patient Question      
  
  
  
                                        Reason              Comments  
   
                                        Vaginal Problem     itching x 1 day, rig  
ht shoulder pain x 1 year  
  
  
  
                                        Reason              Comments  
   
                                        Headache            Elevated glucose zuly  
dings  
  
  
  
                                        Reason              Comments  
   
                                        Cough               Chest congestion, so  
re throat, right ear pain x 2 days  
  
  
  
  
  
                                                    Care Teams (unrecognized sec  
tion and content)  
   
                                          
  
  
  
                                          
   
                                          
  
  
  
                      Team Member Relationship Specialty  Start Date End Date  
   
                                                      
  
  
Dru Lam MD  
  
  
[address] [place] [address]  
  
  
[phone] (Work)  
  
  
[phone] (Fax) PCP - General   Family Practice 10/15/18          
  
  
  
                      Team Member Relationship Specialty  Start Date End Date  
   
                                                      
  
  
Dru Lam MD  
  
  
[address] [place] [address]  
  
  
[phone] (Work)  
  
  
[phone] (Fax) PCP - General   Family Practice 10/15/18          
  
  
  
                      Team Member Relationship Specialty  Start Date End Date  
   
                                                      
  
  
Dru Lam MD  
  
  
[address] [place] [address]  
  
  
[phone] (Work)  
  
  
[phone] (Fax) PCP - General   Family Practice 10/15/18          
  
  
  
                      Team Member Relationship Specialty  Start Date End Date  
   
                                                      
  
  
Dru Lam MD  
  
  
[address] [place] [address]  
  
  
[phone] (Work)  
  
  
[phone] (Fax) PCP - General   Family Practice 10/15/18          
  
  
  
                      Team Member Relationship Specialty  Start Date End Date  
   
                                                      
  
  
Dru Lam MD  
  
  
[address] [place] [address]  
  
  
[phone] (Work)  
  
  
[phone] (Fax) PCP - General   Family Practice 10/15/18          
  
  
  
                      Team Member Relationship Specialty  Start Date End Date  
   
                                                      
  
  
Dru Lam MD  
  
  
[address] [place] [address]  
  
  
[phone] (Work)  
  
  
[phone] (Fax) PCP - General   Family Practice 10/15/18          
  
  
  
                      Team Member Relationship Specialty  Start Date End Date  
   
                                                      
  
  
Dru Lam MD  
  
  
[address] [place] [address]  
  
  
[phone] (Work)  
  
  
[phone] (Fax) PCP - General   Family Practice 10/15/18          
  
  
  
                      Team Member Relationship Specialty  Start Date End Date  
   
                                                      
  
  
Dru Lam MD  
  
  
[address] [place] [address]  
  
  
[phone] (Work)  
  
  
[phone] (Fax) PCP - General   Family Practice 10/15/18          
  
  
  
                      Team Member Relationship Specialty  Start Date End Date  
   
                                                      
  
  
Dru Lam MD  
  
  
[address] [place] [address]  
  
  
[phone] (Work)  
  
  
[phone] (Fax) PCP - General   Family Medicine 10/15/18          
  
  
  
                      Team Member Relationship Specialty  Start Date End Date  
   
                                                      
  
  
Dru Lam MD  
  
  
[address] [place] [address]  
  
  
[phone] (Work)  
  
  
[phone] (Fax) PCP - General   Family Medicine 10/15/18          
  
  
  
                      Team Member Relationship Specialty  Start Date End Date  
   
                                                      
  
  
Dru Lam MD  
  
  
[address] [place] [address]  
  
  
[phone] (Work)  
  
  
[phone] (Fax) PCP - General   Family Medicine 10/15/18          
  
  
  
                      Team Member Relationship Specialty  Start Date End Date  
   
                                                      
  
  
Dru Lam MD  
  
  
[address] [place] [address]  
  
  
[phone] (Work)  
  
  
[phone] (Fax) PCP - General   Family Medicine 10/15/18          
  
  
  
                      Team Member Relationship Specialty  Start Date End Date  
   
                                                      
  
  
Dru Lam MD  
  
  
[address] [place] [address]  
  
  
[phone] (Work)  
  
  
[phone] (Fax) PCP - General   Family Medicine 10/15/18          
  
  
  
                      Team Member Relationship Specialty  Start Date End Date  
   
                                                      
  
  
Dru Lam MD  
  
  
[address] [facility] [place] [address]  
  
  
[phone] (Work)  
  
  
[phone] (Fax) PCP - General   Family Medicine 10/15/18          
  
  
  
                      Team Member Relationship Specialty  Start Date End Date  
   
                                                      
  
  
Dru Lam MD  
  
  
[address] [facility] [place] [address]  
  
  
[phone] (Work)  
  
  
[phone] (Fax) PCP - General   Family Medicine 10/15/18          
  
  
  
                      Team Member Relationship Specialty  Start Date End Date  
   
                                                      
  
  
Dru Lam MD  
  
  
[address] [place] [address]  
  
  
[phone] (Work)  
  
  
[phone] (Fax) PCP - General   Family Medicine 10/15/18          
  
  
  
                      Team Member Relationship Specialty  Start Date End Date  
   
                                                      
  
  
Dur Lam MD  
  
  
[address] [facility] [place] [address]  
  
  
[phone] (Work)  
  
  
[phone] (Fax) PCP - General   Family Medicine 10/15/18          
  
  
  
                      Team Member Relationship Specialty  Start Date End Date  
   
                                                      
  
  
Dru Lam MD  
  
  
[address] [place], [place] [address] [phone] (Work)  
  
  
[phone] (Fax) PCP - General   Family Medicine 10/15/18          
  
  
  
                      Team Member Relationship Specialty  Start Date End Date  
   
                                                      
  
  
Dru Lam MD  
  
  
[address] [place], [place] [id number] [phone] (Work)  
  
  
[phone] (Fax) PCP - General   Family Medicine 10/15/18          
  
  
  
                      Team Member Relationship Specialty  Start Date End Date  
   
                                                      
  
  
Dru Lam MD  
  
  
[address] [place], [place] [address] [phone] (Work)  
  
  
[phone] (Fax) PCP - General   Family Medicine 10/15/18          
  
  
  
                      Team Member Relationship Specialty  Start Date End Date  
   
                                                      
  
  
Dru Lam MD  
  
  
NPI: [id number]  
  
  
[address] [place], [place] [id number] [phone] (Work)  
  
  
[phone] (Fax) PCP - General   Family Medicine 10/15/18          
  
  
  
                      Team Member Relationship Specialty  Start Date End Date  
   
                                                      
  
  
Dru Lam MD  
  
  
NPI: [id number]  
  
  
[address] [place], [place] [address] [phone] (Work)  
  
  
[phone] (Fax) PCP - General   Family Medicine 10/15/18          
  
  
  
  
  
                                                    Care Team (unrecognized sect  
ion and content)  
   
                                                      
  
  
Care Team Personnel  
  
  
Name: DRU LAM MD  
Member Role: Primary Care Physician  
Address:  
Address: [address] [place] [address]  
  
  
  
Name: JOSÉ LUIS Brandon  
Position: AO RN  
Member Role: ED RN  
  
  
  
Name: ARISTIDES JUNG MD  
Position:  ED Physician  
Member Role: Attending Physician  
Address:  
Address: [facility]  
[address] [place] [phone]   
  
  
FOR RECORDS PERTAINING TO PATIENTS WHO ARE OR HAVE BEEN ENROLLED IN A CHEMICAL 
DEPENDENCY/SUBSTANCEABUSE PROGRAM, SOME INFORMATION MAY BE OMITTED. This 
clinical summary was aggregated from multiple sources. Caution should be 
exercised in using it in the provision of clinical care. This summary normalizes
 information from multiple sources, and as a consequence, information in this 
document may materially change the coding, format and clinical context of 
patient data. In addition, data may be omitted in some cases. CLINICAL DECISIONS
 SHOULD BE BASED ON THE PRIMARY CLINICAL RECORDS. [facility] [employer] [facility]. provides
 no warranty or guarantee of the accuracy or completeness of information in this
 document.

## 2024-03-09 NOTE — [id number]
HPI
History of Present Illness
Chief Complaint: Cough
Informant: patient
Onset/Context/Timing
Onset: Weeks
Narrative
Narrative: 
Patient presents with a 1 week history of cough and congestion.  She was seen at urgent care a few nights ago and tested negative for flu, COVID, and RSV.  She was told it was just a cold and it would pass.  Patient complains of continued cough that 
worsened and is now bringing up green sputum.  She does have a history of asthma and has had some increased wheezing as well.  She does not have an inhaler.

[facility]
Medical History (Reviewed 03/09/24 @ 20:04 by Dr. Janett Cummins MD)

Anxiety
Asthma
Bipolar disorder
Bone fracture
Chronic pain
Depression
Diabetes
Generalized headaches
H/O emotional problems
History of back problems
History of recurrent UTIs
Seizures
Smoker
Substance abuse
Tobacco abuse
Type 2 diabetes mellitus
Urinary incontinence, nocturnal enuresis
Vision problems


Home Medications

desmopressin 0.2 mg tablet tablet PO 04/07/23 [History Last Taken Unknown]
trospium 20 mg tablet 20 mg PO 05/18/23 [History Last Taken Unknown]
glimepiride 2 mg tablet See Rx Instructions .Route .COMPLEX #60 tabs 12/05/23 [Rx Last Taken Unknown]
metformin 500 mg tablet See Rx Instructions .Route .COMPLEX #120 tabs 12/05/23 [Rx Last Taken Unknown]
naproxen 500 mg tablet (Naprosyn) 500 mg PO BID PRN pain #20 tabs 02/18/24 [Rx Last Taken Unknown]
azithromycin 250 mg tablet (Zithromax) 250 mg PO DAILY 4 days #4 tabs 03/09/24 [Rx Last Taken Unknown]
prednisone 20 mg tablet 40 mg (2 x 20 mg) PO DAILY #6 tabs 03/09/24 [Rx Last Taken Unknown]




Allergy/AdvReac Type Severity Reaction Status Date / Time
latex Allergy  Itching Verified 03/09/24 17:39
Sulfa (Sulfonamide Allergy  Hives Verified 03/09/24 17:39
Antibiotics)     
zolpidem [From Ambien] Allergy  Other Verified 03/09/24 17:39


Family History (Reviewed 03/09/24 @ 20:05 by Dr. Janett Cummins MD)
Uncle
 Alcoholism
 Cancer
Sister
 Anxiety
 Asthma
 Arthritis
 Hypertension
Father
 Arthritis
 Cancer
 Heart disease
 High cholesterol
 CVA (cerebral vascular accident)
 Suicide
Grandmother
 Cancer
 Seizures
Aunt
 Cancer
 Kidney disease
Mother
 Seizures
Other
 FH: birth defects



Social History (Reviewed 03/09/24 @ 20:05 by Dr. Janett Cummins MD)
Smoking Status:  Current every day smoker tobacco type: cigarettes 
alcohol intake:  never 
substance use type:  does not use 
what type of physical activity do you participate in:  none 



ROS
ROS ED
Constitutional
Constitutional ED: Denies chills
Eyes
Eyes: Denies change in vision or discharge from eye(s)
ENT
ENT ED: Reports sore throat; Denies discharge from eye(s) or rhinorrhea
Cardiovascular
Cardiovascular: Denies chest pain or palpitations
Respiratory/Chest
Respiratory/Chest: Reports cough, dyspnea and sputum
Gastrointestinal
Gastrointestinal: Denies abdominal pain, nausea or vomiting
Genitourinary
Genitourinary ED: Denies dysuria
Musculoskeletal
Musculoskeletal: Denies back pain or extremity pain
Integumentary
Denies Abrasions or rash
Neurologic
Neurologic: Denies headache(s) or weakness
Allergic/Immunologic
Allergic/Immunologic ED: Denies lip swelling or urticaria

EXAM
Physical Exam
Const
Vital Signs: 



 03/09/24
17:37
Temperature 97.4 F L
Temperature Source Temporal
Pulse Rate 101 H
Respiratory Rate 20 H
Blood Pressure 151/94 H
Blood Pressure Mean 113
Pulse Ox 97
Oxygen Delivery Method Room Air



Positive well nourished and well developed
General Appearance ED: well developed
HEENT
Reports moist mucous membranes
Eyes
EOMs intact bilaterally
Chest Wall
inspection of chest normal and palpation of chest normal
Resp
normal respiratory effort
Resp Narrative: 
Slightly decreased air movement bilaterally with mild expiratory wheeze.
Cardio
regular rate and regular rhythm
GI
non-tender
Palpation: soft
Extremity
normal to inspection
Neuro
oriented x3 and no sensory deficits noted
Motor Exam: strength 5/5 throughout
Psych
mental status grossly normal
Skin
no rashes or lesions noted

MDM
MDM
MDM Narrative
Medical decision making narrative: 
1 view chest x-ray obtained in triage.  Per my interpretation no obvious infiltrate.  Radiology interpretation reviewed and agrees.
With patient having 1 week of symptoms that are worsening with underlying asthma I will cover her with an albuterol inhaler and prednisone.  I will also give her a course of Zithromax.  Patient was advised that this is a viral infection the 
antibiotics will likely not help.  I do feel that the steroid and inhaler will help with her underlying asthma.
Radiography
Diagnostic Testing: 

Clinical Impression(s) from Imaging Studies

Chest X-Ray  03/09/24 18:25
IMPRESSION:
 
No radiographic evidence of acute cardiopulmonary disease.
 
Electronically Signed:
Jose Najera MD
2024/03/09 at 19:02 EST
Reading Location ID and State: [address] / [place]
Tel [phone], Service support  [phone], Fax [phone]
 





Discharge Plan
Triage
Chief Complaint: Cough

ED Provider: Janett Cummins

Dx/Rx/DC Orders
Clinical Impression:
 Asthma exacerbation, Bronchitis


Instructions:  ED Bronchitis with Wheezing (Adult)

Prescriptions:
New
  azithromycin [Zithromax] 250 mg tablet 
   250 mg PO DAILY 4 Days Qty: 4 0RF
   Rx Instructions:
   start on day 2 of therapy
  prednisone 20 mg tablet 
   40 mg PO DAILY Qty: 6 0RF

No Action
  desmopressin 0.2 mg tablet 
     PO  
  trospium 20 mg tablet 
   20 mg PO  
   Patient Comments:
   TAKE 1 TABLET BY MOUTH TWICE DAILY
  naproxen [Naprosyn] 500 mg tablet 
   500 mg PO BID PRN (Reason: pain) Qty: 20 0RF
  metformin 500 mg tablet 
   See Rx Instructions  .ROUTE .COMPLEX Qty: 120 0RF
   Dose Instruction:
   Take 2 tablets by mouth twice daily
   Rx Instructions:
   Take 2 tablets by mouth twice daily
  glimepiride 2 mg tablet 
   See Rx Instructions  .ROUTE .COMPLEX Qty: 60 0RF
   Dose Instruction:
   Take 1 tablet by mouth twice daily
   Rx Instructions:
   Take 1 tablet by mouth twice daily

Primary Care Provider: Care Physician,No Primary

Referrals:
Chicho Jolly MD [Med Staff - Active Staff] - As Needed
Care Physician,No Primary [Primary Care Provider] - 

Disposition
***Disposition***: Home, Self Care

## 2024-03-09 NOTE — RAD_ITS
REPORT-ID:[id number] 
 
INDICATION: cough 
 
EXAMINATION/TECHNIQUE: 
X-RAY - XR Chest 1 View 
 
COMPARISON: 12/26/2022 
____________________________________________ 
 
FINDINGS: 
 
LINES/DEVICES: None. 
LUNGS: No consolidation, edema or effusion. No pneumothorax. 
MEDIASTINUM AND CARDIOVASCULAR STRUCTURES: Cardiac silhouette not enlarged. 
Central airways and mediastinal contour are unremarkable. 
BONES AND SOFT TISSUES: Unremarkable. 
 
ORDER #: [id number] RAD/Chest 1 View  
IMPRESSION:  
 
  
No radiographic evidence of acute cardiopulmonary disease.  
 
  
Electronically Signed:  
Jose Najera MD  
2024/03/09 at 19:02 EST  
Reading Location ID and State: [address] / [place]  
Tel [phone], Service support  [phone], Fax [phone]